# Patient Record
Sex: MALE | Race: WHITE | NOT HISPANIC OR LATINO | Employment: UNEMPLOYED | URBAN - METROPOLITAN AREA
[De-identification: names, ages, dates, MRNs, and addresses within clinical notes are randomized per-mention and may not be internally consistent; named-entity substitution may affect disease eponyms.]

---

## 2019-01-30 ENCOUNTER — HOSPITAL ENCOUNTER (OUTPATIENT)
Dept: OTHER | Facility: HOSPITAL | Age: 24
Discharge: HOME | End: 2019-01-30
Attending: INTERNAL MEDICINE

## 2019-01-30 ENCOUNTER — OFFICE VISIT CONVERTED (OUTPATIENT)
Dept: PULMONOLOGY | Facility: CLINIC | Age: 24
End: 2019-01-30
Attending: INTERNAL MEDICINE

## 2019-04-16 ENCOUNTER — OFFICE VISIT CONVERTED (OUTPATIENT)
Dept: ORTHOPEDIC SURGERY | Facility: CLINIC | Age: 24
End: 2019-04-16
Attending: ORTHOPAEDIC SURGERY

## 2019-04-30 ENCOUNTER — HOSPITAL ENCOUNTER (OUTPATIENT)
Dept: CARDIOLOGY | Facility: HOSPITAL | Age: 24
Discharge: HOME | End: 2019-04-30
Attending: INTERNAL MEDICINE

## 2020-01-10 ENCOUNTER — OFFICE VISIT CONVERTED (OUTPATIENT)
Dept: PULMONOLOGY | Facility: CLINIC | Age: 25
End: 2020-01-10
Attending: PHYSICIAN ASSISTANT

## 2020-01-13 ENCOUNTER — HOSPITAL ENCOUNTER (OUTPATIENT)
Dept: OTHER | Facility: HOSPITAL | Age: 25
Discharge: HOME | End: 2020-01-13
Attending: PHYSICIAN ASSISTANT

## 2020-01-14 LAB
CONV IGG SUBCLASS 2: 221 MG/DL (ref 130–555)
CONV IGG SUBCLASS 3: 32 MG/DL (ref 15–102)
CONV IGG SUBCLASS 4: 268 MG/DL (ref 2–96)
CONV IMMUNOGLOBULIN G (IGG): 938 MG/DL (ref 700–1600)
CONV IMMUNOGLOBULIN G (IGG): 961 MG/DL (ref 700–1600)
CONV IMMUNOGLOBULIN M (IGM): 47 MG/DL (ref 20–172)
IGA SERPL-MCNC: 237 MG/DL (ref 90–386)
IGG SUBCLASS 1: 335 MG/DL (ref 248–810)

## 2020-01-15 ENCOUNTER — HOSPITAL ENCOUNTER (OUTPATIENT)
Dept: OTHER | Facility: HOSPITAL | Age: 25
Discharge: HOME | End: 2020-01-15
Attending: PHYSICIAN ASSISTANT

## 2020-01-15 LAB
COCOA IGE QN: <0.1 K[IU]/ML (ref 0–0.35)
CORN IGE QN: <0.1 K[IU]/ML (ref 0–0.35)
COW MILK IGE QN: <0.1 K[IU]/ML (ref 0–0.35)
EGG WHITE IGE QN: <0.1 K[IU]/ML (ref 0–0.35)
IGE SERPL-ACNC: 328 K[IU]/ML (ref 0–24)
IMMUNOCAP RESULT: ABNORMAL (ref 0–0)
OAT IGE QN: <0.1 K[IU]/ML (ref 0–0.35)
PEANUT IGE QN: <0.1 K[IU]/ML (ref 0–0.35)
RICE IGE QN: <0.1 K[IU]/ML (ref 0–0.35)
SOYBEAN IGE QN: <0.1 K[IU]/ML (ref 0–0.35)
WHEAT IGE QN: <0.1 K[IU]/ML (ref 0–0.35)

## 2020-01-16 LAB
CONV QUANTIFERON TB GOLD: NEGATIVE
QUANTIFERON CRITERIA: NORMAL
QUANTIFERON MITOGEN VALUE: >10 IU/ML
QUANTIFERON NIL VALUE: 0.01 IU/ML
QUANTIFERON TB1 AG VALUE: 0.03 IU/ML
QUANTIFERON TB2 AG VALUE: 0.01 IU/ML

## 2021-05-15 VITALS — OXYGEN SATURATION: 97 % | BODY MASS INDEX: 21.71 KG/M2 | HEART RATE: 90 BPM | HEIGHT: 61 IN | WEIGHT: 115 LBS

## 2021-05-28 VITALS
HEIGHT: 61 IN | HEART RATE: 97 BPM | BODY MASS INDEX: 22.87 KG/M2 | WEIGHT: 121.12 LBS | OXYGEN SATURATION: 97 % | DIASTOLIC BLOOD PRESSURE: 80 MMHG | RESPIRATION RATE: 10 BRPM | SYSTOLIC BLOOD PRESSURE: 112 MMHG | TEMPERATURE: 99.6 F

## 2021-05-28 VITALS
HEART RATE: 115 BPM | DIASTOLIC BLOOD PRESSURE: 91 MMHG | WEIGHT: 112.25 LBS | OXYGEN SATURATION: 96 % | TEMPERATURE: 98.9 F | BODY MASS INDEX: 21.19 KG/M2 | SYSTOLIC BLOOD PRESSURE: 125 MMHG | RESPIRATION RATE: 16 BRPM | HEIGHT: 61 IN

## 2021-05-28 NOTE — PROGRESS NOTES
Patient: SHANE CAICEDO     Acct: [id number]     Report: #[id number]  UNIT #: [id number]     : 1995    Encounter Date:2019  PRIMARY CARE: MCKAYLA SHABAZZ  ***Signed***  --------------------------------------------------------------------------------------------------------------------  Chief Complaint      Encounter Date      2019            Primary Care Provider      MCKAYLA SHABAZZ            Referring Provider            [facility]            Patient Complaint      Patient is complaining of      New pt here for [facility] f/u            VITALS      Height 5 ft 1.00 in / 154.94 cm      Weight 121 lbs 2.000 oz / 54.956423 kg      BSA 1.53 m2      BMI 22.9 kg/m2      Temperature 99.6 F / 37.56 C - Temporal      Pulse 97      Respirations 10      Blood Pressure 112/80 Sitting, Right Arm      Pulse Oximetry 97%, Room air            HPI      The patient is a 23 year old  male with a history of Cystic Fibrosis     diagnosed in childhood and was followed at [facility] pediatrics     Cystic Fibrosis clinic until approximately 3 years ago when he was lost to     follow up in transition to the adult clinic. He is here today for follow up     after I had seen  him in the hospital for MSSA pneumonia and acute hypoxic     respiratory failure. He was admitted on 19 through 19. The patient     underwent bronchoscopy which grew MSSA and his antibiotics were adjusted     appropriately. Additionally, he was worked up for a fungal etiology as the     patient had been cleaning out a chicken coop one week prior to his presentation.    He was then started on empiric Itraconazole and this was stopped after his     fungal smears came back negative. The patient improved throughout his stay in     the hospital, weaned to room air and discharged home on Brovana and Pulmicort     twice daily and has been compliant with this. He has been trying to get a vest     for percussion given his  "history bronchiectasis, this has yet to be delivered to    his home. He is currently living with his father as he is technically homeless.     He came here today on a [employer] bus. The patient is requesting a refill on his     Creon for pancreatic insufficiency. He denies any cough, shortness of breath,     sore throat or chest pain. He does not have a scale but thinks that his weight     has been stable.             Review of Systems is negative.             PAST MEDICAL HISTORY:      1. Cystic Fibrosis.       2. Pancreatic insufficiency secondary to Cystic Fibrosis.       3. History of asthma overlap syndrome.       4. Recent history of MSSA pneumonia.             PAST SURGICAL HISTORY:      1. PEG tube as a child status post reversal.       2. \"Head surgery\" not otherwise specified.            FAMILY HISTORY:      Sister with a history of diabetes.             SOCIAL HISTORY:      The patient is currently applying for disability. He is homeless but is     currently residing with his father and friends. He denies smoking but admitted     in the hospital that he would occasionally use marijuana. He states he has     stopped this completely since his hospitalization.             Medications were reviewed by myself with the patient and updated in the chart.            ROS      Constitutional:  Denies: Fatigue, Fever, Weight gain, Weight loss, Chills,     Insomnia, Other      Respiratory/Breathing:  Denies: Shortness of air, Wheezing, Cough, Hemoptysis,     Pleuritic pain, Other      Endocrine:  Denies: Polydipsia, Polyuria, Heat/cold intolerance, Diabetes, Other      Eyes:  Denies: Blurred vision, Vision Changes, Other      Ears, nose, mouth, throat:  Denies: Mouth lesions, Thrush, Throat pain,     Hoarseness, Allergies/Hay Fever, Post Nasal Drip, Headaches, Recent Head Injury,    Nose Bleeding, Neck Stiffness, Thyroid Mass, Hearing Loss, Ear Fullness, Dry     Mouth, Nasal or Sinus Pain, Dry Lips, Nasal discharge, Nasal " congestion, Other      Cardiovascular:  Denies: Palpitations, Syncope, Claudication, Chest Pain, Wake     up Gasping for air, Leg Swelling, Irregular Heart Rate, Cyanosis, Dyspnea on     Exertion, Other      Gastrointestinal:  Denies: Nausea, Constipation, Diarrhea, Abdominal pain,     Vomiting, Difficulty Swallowing, Reflux/Heartburn, Dysphagia, Jaundice,     Bloating, Melena, Bloody stools, Other      Genitourinary:  Denies: Urinary frequency, Incontinence, Hematuria, Urgency,     Nocturia, Dysuria, Testicular problems, Other      Musculoskeletal:  Denies: Joint Pain, Joint Stiffness, Joint Swelling, Myalgias,    Other      Hematologic/lymphatic:  DENIES: Lymphadenopathy, Bruising, Bleeding tendencies,     Other      Neurological:  Denies: Headache, Numbness, Weakness, Seizures, Other      Psychiatric:  Denies: Anxiety, Appropriate Effect, Depression, Other      Sleep:  No: Excessive daytime sleep, Morning Headache?, Snoring, Insomnia?, Stop    breathing at sleep?, Other      Integumentary:  Denies: Rash, Dry skin, Skin Warm to Touch, Other      Immunologic/Allergic:  Denies: Latex allergy, Seasonal allergies, Asthma,     Urticaria, Eczema, Other      Immunization status:  No: Up to date            FAMILY/SOCIAL/MEDICAL HX      Surgical History:  Yes: Abdominal Surgery (PEG TUBE AS CHILD), Head Surgery; No:    Appendectomy, Bladder Surgery, Bowel Surgery, CABG, Cholecystectomy, Oral     Surgery, Orthopedic Surgery, Vascular Surgery      Diabetes - Family Hx:  Sister      Is Father Still Living?:  Yes       Family History:  Unknown      Smoking status:  Never smoker      Anticoagulation Therapy:  No      Antibiotic Prophylaxis:  No      Medical History:  Yes: Asthma, Shortness Of Breath, Miscellaneous Medical/oth     (CYSTIC FIBROSIS); No: Blood Disease, Chemotherapy/Cancer, Chronic     Bronchitis/COPD, Deafness or Ringing Ears      Psychiatric History      None            PREVENTION      Hx Influenza Vaccination:   Yes      Date Influenza Vaccine Given:  Jan 1, 2019      Influenza Vaccine Declined:  No      2 or More Falls Past Year?:  No      Fall Past Year with Injury?:  No      Hx Pneumococcal Vaccination:  No      Encouraged to follow-up with:  PCP regarding preventative exams.      Chart initiated by      Trinity Jarquin MA            ALLERGIES/MEDICATIONS      Allergies:        Coded Allergies:             NO KNOWN ALLERGIES (Unverified , 1/30/19)      Medications    Last Reconciled on 1/30/19 11:10 by MIKE FREEMAN DO      guaiFENesin (Mucinex*) 1,200 Mg Tab.er.12h      1200 MG PO BID, #60 TAB 0 Refills         Prov: JanineParam gaineser         1/13/19       Neb-Budesonide (Budesonide) 0.5 Mg/2 Ml Ampul.neb      0.5 MG INH RTBID, #60 NEB 0 Refills         Prov: JanineDany gaines         1/13/19       Arformoterol Tartrate (Brovana) 15 Mcg/2 Ml Vial.neb      15 MCG INH RTBID, #60 NEB 0 Refills         Prov: Dany Mehta         1/13/19       Albuterol/Ipratropium (Duoneb) 3 Ml Ampul.neb      3 ML INH RTQ4H PRN for WHEEZING / SHORTNESS OF BREATH, #120 NEB 0 Refills         Prov: Dany Mehta         1/13/19       Amylase/Lipase/Protease (Creon 6*) 1 Each Capsule.      6000 UNITS PO WITH MILK for 30 Days, #60 CAP.SR         Reported         1/9/19       Amylase/Lipase/Protease (Creon 6*) 1 Each Capsule.      3-4 CAP PO WITH SNACKS for 30 Days, #60 CAP.SR         Reported         1/9/19       Amylase/Lipase/Protease (Creon 6*) 1 Each Capsule.      5-6 CAP PO TID MEALS for 30 Days, #90 CAP.SR         Reported         1/9/19      Current Medications      Current Medications Reviewed 1/30/19            EXAM      Vital signs reviewed.      HEENT: Pupils are equally round and reactive to light and accommodation.      Extraocular muscles intact bilateral. Nares patent without hypertrophy of the     turbinates.  TM's are clear bilaterally. Small oropharynx without lesions or     erythema.       NECK:  Supple without tracheal  deviation or lymphadenopathy. No thyromegaly     appreciated.       LYMPHATICS: No cervical or supraclavicular lymphadenopathy.       RESPIRATORY:  Clear to auscultation bilaterally, no wheezes, rales or rhonchi,     tympanic to percussion.      CVS:  Regular rate and rhythm, no murmurs, rubs or gallops, no lower extremity     edema, , equal radial pulses.      GI: Abdomen soft, nontender, nondistended, no hepatomegaly appreciated.  Bowel     sounds present in all 4 quadrants.       MUSCULOSKELETAL: No erythema, warmth or fluctuance over the major joints     including the knees, ankles, wrists and elbows.        SKIN: No rashes or lesions.       NEUROLOGICAL: Alert and oriented X 3.  No focal deficits on exam. Cranial nerves    II-XII intact bilaterally.       PSYCH: Patient has appropriate mood and affect.      Vtials      Vitals:             Height 5 ft 1.00 in / 154.94 cm           Weight 121 lbs 2.000 oz / 54.718728 kg           BSA 1.53 m2           BMI 22.9 kg/m2           Temperature 99.6 F / 37.56 C - Temporal           Pulse 97           Respirations 10           Blood Pressure 112/80 Sitting, Right Arm           Pulse Oximetry 97%, Room air            REVIEW      Results Reviewed      PCCS Results Reviewed?:  Yes Prev Lab Results, Yes Prev Radiology Results, Yes     Previous M[facility]ial Records (I personally reviewed the patient's hospital discharge     summary. )      Lab Results      I personally reviewed the patient's microbiology results from his bronchoscopy.     He grew MSSA. Fungal culture acid fast bacillus smear was negative. I also     reviewed pathology which was negative for malignancy. I also reviewed blood work    including IgE level which was 107, the rest of the immunoglobulin panel was not     results. Urine histo antigen was less than 0.5.  Bronchoalveolar lavage histo     antigen was negative. Quantiferon tuberculosis gold test was indeterminate. HIV     was negative. Cystic Fibrosis mutation  panel showed Cystic Fibrosis mutation TG     2 variants detected. It was positive for 2 mutations, delta FI 08 and delta F5     08.      Radiographic Results               [facility]                PACS RADIOLOGY REPORT            Patient: SHANE CAICEDO   Acct: #[id number]   Report: #[id number]            UNIT #: [id number]    DOS: 01/10/19 0806      INSURANCE:[employer]   ORDER #:[id number]      LOCATION:[address]   : 1995            PROVIDERS      ADMITTING:  Dany Mehta   ATTENDING: Dany Mehta      FAMILY:  NONE,MD   ORDERING:  MIKE FREEMAN         OTHER:    DICTATING:  Niko Galvez MD            REQ #:[id number]   EXAM:CHWO - CT CHEST without CONTRAST      REASON FOR EXAM:  Hypoxia, pneumonia, exposure to histoplasmosis      REASON FOR VISIT:  PNEUMONIA            *******Signed******         PROCEDURE:   CT CHEST WITHOUT CONTRAST             COMPARISON:   [facility], CR, CHEST PA/AP   19:33.  [facility], , CHEST PA/AP          INDICATIONS:   Hypoxia, pneumonia, exposure to histoplasmosis             TECHNIQUE:   CT images were created without the administration of contrast     material.               PROTOCOL:     Standard imaging protocol performed                RADIATION:     DLP: 339mGy*cm          Automated exposure control was utilized to minimize radiation dose.              FINDINGS:         Lung window images reveal diffuse bronchial wall thickening.  Bilateral     bronchocentric alveolar       airspace disease is seen throughout both lungs, upper lobe predominant.             Mediastinal windows reveal mediastinal adenopathy.  Hilar fullness probably     represents adenopathy.             No Coronary artery calcifications are evident.             The spleen is of normal size.             CONCLUSION:         CT scan of the chest without IV contrast demonstrating bilateral  alveolar     airspace disease with       bronchial wall thickening and adenopathy.             The differential diagnosis includes fungal infection and tuberculosis.      Metastatic disease,       leukemia, and atypical lung infection are less likely.                RACHAEL DELUNA MD             Electronically Signed and Approved By: RACHAEL DELUNA MD on 1/10/2019 at 11:00                           Until signed, this is an unconfirmed preliminary report that may contain      errors and is subject to change.                                              COUST:      D:01/10/19 1100            Assessment      Cystic fibrosis - E84.9            Bronchiectasis - J47.9            Bronchiectasis with acute lower respiratory infection - J47.0            Homelessness - Z59.0            Notes      Changed Medications      * Amylase/Lipase/Protease (Creon 6*) 1 EACH CAPSULE.DR:         From: 5-6 CAP PO TID MEALS 30 Days #90         To: 6 CAP PO ASDIR 30 Days #900         Instructions: 6 caps with meals 3 times a day 4 caps with snacks 2 times a        day 1 cap with milk 4 times a day      Discontinued Medications      * Amoxicillin/Clavulanic Acid 875/125 (Augmentin 875/125) 1 EACH TABLET: 875 MG       PO BID #20      * Amylase/Lipase/Protease (Creon 6*) 1 EACH CAPSULE.DR: 3-4 CAP PO WITH SNACKS       30 Days #60         Instructions: 1 CAP      * Amylase/Lipase/Protease (Creon 6*) 1 EACH CAPSULE.DR: 6,000 UNITS PO WITH MILK      30 Days #60         Instructions: 1 CAP      New Diagnostics      * 6 Min Walk With Pulse Ox, Routine         Dx: Cystic fibrosis - E84.9      * PFT-Comp, PrePost,DLCO,BodyBox, Routine         Dx: Cystic fibrosis - E84.9      * Chest W/O Cont CT, Month         Dx: Bronchiectasis with acute lower respiratory infection - J47.0      * Quantiferon Tb Test, As Soon As Possible         Dx: Homelessness - Z59.0      * Rast Panel 10 Allerg, Routine         Dx: Bronchiectasis - J47.9      * Rast Aspergillus  Fum IGE, Routine         Dx: Bronchiectasis - J47.9      * Immuno A (Iga), Routine         Dx: Bronchiectasis - J47.9      * Immuno G (Igg), Routine         Dx: Bronchiectasis - J47.9      * IgG SUBCLASSES 1-4  IGGSC, Routine         Dx: Bronchiectasis - J47.9      * Immuno M (Igm), Routine         Dx: Bronchiectasis - J47.9      New Office Procedures      * Prevnar 0.5 PCV13, As Soon As Possible         Pneumoc 13-Kayleigh Conj-Dip CRm/Pf (Prevnar 13 Syringe) 0.5 ML SYRINGE: 0.5        MILLILITER INTRAMUSCULARLY Qty 1 SYRINGE         Dx: Cystic fibrosis - E84.9      ASSESSMENT:      1. 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      2. Bronchiectasis.       3. Bronchiectasis with recent lower respiratory infection secondary to MSSA .       4. MSSA pneumonia .       5. Indeterminate tuberculosis quantiferon test.       6. Homelessness.       7. Pancreatic insufficiency secondary to Cystic Fibrosis.       8. Elevated IgE.             PLAN:      1. I have ordered full pulmonary function tests and a six minute walk test as a     part of his routine work up for Cystic Fibrosis.       2. I have ordered a repeat CT scan of the chest without contrast in 1 month to     make sure the diffuse bilateral airspace disease is improving as it was     suspicious for a fungal or miliary tuberculosis pattern.       3. I have asked for a repeat quantiferon tuberculosis test as soon as possible.     The patient denies any tuberculosis exposures.       4. In regards to his elevated IgE level, I have ordered a RAST aspergillus with     specific IgE antibody and sent the other immunoglobulins as part of the work up     for his bronchiectasis, IgA, IgM and IgG subclasses.       5. Continue Brovana and Pulmicort twice daily.       6. We will await his vest to be delivered  for airway clearance.       7. We gave the patient Prevnar vaccine today given his immunocompromised state     in the setting of Cystic Fibrosis. He had his flu vaccine  prior to his discharge    from the hospital.       8. The patient will follow up in 1-2 months to go over the above testing.            Patient Education      Time Spent:  > 50% /[facility] (cystic fibrosis, airway clearance)                 Disclaimer: Converted document may not contain table formatting or lab diagrams. Please see [employer] for the authenticated document.

## 2021-05-28 NOTE — PROGRESS NOTES
Patient: SHANE CAICEDO     Acct: [id number]     Report: #[id number]  UNIT #: [id number]     : 1995    Encounter Date:01/10/2020  PRIMARY CARE: MCKAYLA SHABAZZ  ***Signed***  --------------------------------------------------------------------------------------------------------------------  Chief Complaint      Encounter Date      Juan 10, 2020            Primary Care Provider      MCKAYLA SHABAZZ            Referring Provider            [facility]            Patient Complaint      Patient is complaining of      Pt here for [facility] Art pt, Cystic Fibrosis            VITALS      Height 5 ft 1 in / 154.94 cm      Weight 112 lbs 4 oz / 50.956000 kg      BSA 1.48 m2      BMI 21.2 kg/m2      Temperature 98.9 F / 37.17 C - Oral      Pulse 115      Respirations 16      Blood Pressure 125/91 Sitting, Left Arm      Pulse Oximetry 96%, room air            HPI      The patient is a very pleasant 24 white male seen by Dr. Cox as a new patient    1 year ago on 19. He had cystic fibrosis diagnosed in childhood and was     followed at [facility] clinic until several years ago when he     was lost to follow up in transition to the adult clinic. He followed up with Dr. Cox after being hospitalized for MSSA pneumonia and acute hypoxic respiratory    failure. He underwent bronchoscopy during that hospitalization and she continued    him on Perforomist and Pulmicort nebulizer, ordered vest therapy which he did     receive but is not using. He was apparently homeless at the time of his last vi    sit and had an indeterminate tuberculosis quantiferon gold test. She ordered     additional work up including full pulmonary function test and 6 minute walk test    which he had done in May 2019.  She ordered repeat CT scan of the chest to be     done in 1 month to ensure resolution of recent pneumonia. She ordered repeat     quantiferon gold test to be done as soon as possible and the patient  never had     this done. She also ordered additional work up for his elevated IgE level and     work up for bronchiectasis including immunoglobulins and RAST panel. He has been    lost to follow up until now and appears to be here today because he wants a     refill on his Creon. The patient states he has not followed up with any medical     providers in the past year and seemed completely unaware that he was supposed to    have repeat quantiferon gold test, blood work or CT scan of the chest done. He     feels he is currently in his normal state of health and denies increased     dyspnea, coughing or wheezing, hemoptysis, fever or chills. The patient states     he chronically coughs up light yellow sputum and this is unchanged for him. He     is no longer using any nebulizer and apparently ran out of Perforomist and     Pulmicort. He is here with an older male friend of his who picked him up     hitchhiking 5-6 years ago and they have remained friends since then. He friend     was not aware he was supposed to be using a nebulizer machine, vest therapy or     that he was supposed to have additional work up done. The patient reports he is     not using his vest therapy.             I reviewed the Review of Systems, medical, surgical and family history and agree    with those as entered.      Copies To:   MIKE FREEMAN      Constitutional:  Denies: Fatigue, Fever, Weight gain, Weight loss, Chills,     Insomnia, Other      Respiratory/Breathing:  Complains of: Cough; Denies: Shortness of air, Wheezing,    Hemoptysis, Pleuritic pain, Other      Endocrine:  Denies: Polydipsia, Polyuria, Heat/cold intolerance, Diabetes, Other      Eyes:  Denies: Blurred vision, Vision Changes, Other      Ears, nose, mouth, throat:  Denies: Mouth lesions, Thrush, Throat pain,     Hoarseness, Allergies/Hay Fever, Post Nasal Drip, Headaches, Recent Head Injury,    Nose Bleeding, Neck Stiffness, Thyroid Mass, Hearing Loss, Ear  Fullness, Dry     Mouth, Nasal or Sinus Pain, Dry Lips, Nasal discharge, Nasal congestion, Other      Cardiovascular:  Denies: Palpitations, Syncope, Claudication, Chest Pain, Wake     up Gasping for air, Leg Swelling, Irregular Heart Rate, Cyanosis, Dyspnea on     Exertion, Other      Gastrointestinal:  Denies: Nausea, Constipation, Diarrhea, Abdominal pain,     Vomiting, Difficulty Swallowing, Reflux/Heartburn, Dysphagia, Jaundice,     Bloating, Melena, Bloody stools, Other      Genitourinary:  Denies: Urinary frequency, Incontinence, Hematuria, Urgency,     Nocturia, Dysuria, Testicular problems, Other      Musculoskeletal:  Denies: Joint Pain, Joint Stiffness, Joint Swelling, Myalgias,    Other      Hematologic/lymphatic:  DENIES: Lymphadenopathy, Bruising, Bleeding tendencies,     Other      Neurological:  Denies: Headache, Numbness, Weakness, Seizures, Other      Psychiatric:  Denies: Anxiety, Appropriate Effect, Depression, Other      Sleep:  No: Excessive daytime sleep, Morning Headache?, Snoring, Insomnia?, Stop    breathing at sleep?, Other      Immunologic/Allergic:  Denies: Latex allergy, Seasonal allergies, Asthma,     Urticaria, Eczema, Other      Immunization status:  No: Up to date            FAMILY/SOCIAL/MEDICAL HX      Surgical History:  Yes: Abdominal Surgery (PEG TUBE AS CHILD), Head Surgery; No:    Appendectomy, Bladder Surgery, Bowel Surgery, CABG, Cholecystectomy, Oral     Surgery, Orthopedic Surgery, Vascular Surgery      Diabetes - Family Hx:  Sister      Is Father Still Living?:  Yes       Family History:  Unknown      Social History:  No Tobacco Use, No Alcohol Use, No Recreational Drug use      Smoking status:  Never smoker      Anticoagulation Therapy:  No      Antibiotic Prophylaxis:  No      Medical History:  Yes: Asthma, Shortness Of Breath, Miscellaneous Medical/oth     (CYSTIC FIBROSIS); No: Blood Disease, Chemotherapy/Cancer, Chronic     Bronchitis/COPD, Deafness or Ringing Ears,  Sinus Trouble      Psychiatric History      none            PREVENTION      Hx Influenza Vaccination:  Yes      Date Influenza Vaccine Given:  Jan 1, 2019      Influenza Vaccine Declined:  No      2 or More Falls Past Year?:  No      Fall Past Year with Injury?:  No      Hx Pneumococcal Vaccination:  No      Encouraged to follow-up with:  PCP regarding preventative exams.      Chart initiated by      Mike Gil MA            ALLERGIES/MEDICATIONS      Allergies:        Coded Allergies:             NO KNOWN ALLERGIES (Unverified , 1/10/20)      Medications    Last Reconciled on 1/10/20 15:18 by KHURRAM HANSEN      Amylase/Lipase/Protease (Creon 6*) 1 Each Capsule.dr [place] CAP PO ASDIR for 30 Days, #900 CAP.SR 6 Refills         Prov: PETE,MIKE         1/30/19      Current Medications      Current Medications Reviewed 1/10/20            EXAM      VITAL SIGNS:  Reviewed.        NECK:  Supple without tracheal deviation or lymphadenopathy.  No thyromegaly     appreciated.      LYMPHATICS:  No cervical or supraclavicular lymphadenopathy.      HEENT: Pupils are equal, round and reactive to light. There is no scleral     icterus.  Nares patent without hypertrophy of the turbinates. No erythema of the    passages.  TMs are clear bilaterally with good cone of light. The posterior     pharynx is without  lesions or erythema.      RESPIRATORY: Mildly decreased breath sounds throughout, no wheezes, rhonchi or     crackles, normal work of breathing noted.        CARDIOVASCULAR:  Regular rate and rhythm.  No murmurs, gallops or rubs.  No     lower extremity edema.  Equal radial pulses.        GI: Soft, nontender, nondistended, no organomegaly.  Bowel sounds present in all    four quadrants.      MUSCULOSKELETAL:  No joint effusions, erythema or warmth over the major joint     systems.      SKIN:  No rashes or lesions.      NEUROLOGIC: Cranial nerves II-XII are intact bilaterally.  Moves all     extremities. Ambulates  with ease.      PSYCH:  Appropriate mood and affect.      Vtials      Vitals:             Height 5 ft 1 in / 154.94 cm           Weight 112 lbs 4 oz / 50.956036 kg           BSA 1.48 m2           BMI 21.2 kg/m2           Temperature 98.9 F / 37.17 C - Oral           Pulse 115           Respirations 16           Blood Pressure 125/91 Sitting, Left Arm           Pulse Oximetry 96%, room air            REVIEW      Results Reviewed      PCCS Results Reviewed?:  Yes Prev Lab Results, Yes Prev Radiology Results, Yes     Previous Mecial Records      PFT Results      Patient: MARCUS CAICEDO   Acct: #[id number]   Report: #[id number]            UNIT #: [id number]    DOS:       LOCATION:[facility]     : 1995            PROVIDERS      ADMITTING:     FAMILY:  EDYTA GUERRA         OTHER:       DICTATING:  MIKE FREEMAN DO            REASON FOR VISIT:  J47.9            *******Signed******                                    [facility]                          Health Information Management Services                            [place], [place]  [address]               __________________________________________________________________________             Patient Name:                   Attending Physician:      Marcus Caicedo D.O.             Patient Visit # MR #            Admit Date  Disch Date     Location      [id number]    [id number]      2019                 [facility]- -             Date of Birth      1995      __________________________________________________________________________      821 - DIAGNOSTIC REPORT             PULMONARY FUNCTION TEST             SPIROMETRY:      FEV1/FVC ratio 98%.      FEV1 129% of predicted, 4.61 L.      % of predicted, 5.64 L.             LUNG VOLUMES:      Total lung capacity 136% of predicted, 6.92 L.      Residual volume 130% of predicted, 1.28 L.             DIFFUSION CAPACITY:      DLCO 96% of predicted.              CONCLUSION:      1.  Normal spirometry without obstructive defect.      2.  Lung volumes show airtrapping and hyperinflation.      3.  Diffusion capacity is normal.      4.  Consider emphysema.             To be electronically signed in [facility]      [id number] MIKE FREEMAN D.O.             KM:vh      D:  05/13/2019 09:21      T:  05/13/2019 10:15      #[id number]             Until signed, this is an unconfirmed preliminary report that may contain      errors and is subject to change.                   Until signed, this is an unconfirmed preliminary report that may contain      errors and is subject to change.                     <Electronically signed by MIKE FREEMAN DO>                05/13/19 1229               MIKE FREEMAN DO                                                                  [facility]      D:05/13/19 0921            Assessment      Cystic fibrosis - E84.9            Multifocal pneumonia - J18.9            Notes      Renewed Medications      * Amylase/Lipase/Protease (Creon 6*) 1 EACH CAPSULE.DR: 6 CAP PO ASDIR 30 Days       #900         Instructions: 6 caps with meals 3 times a day 4 caps with snacks 2 times a        day 1 cap with milk 4 times a day      * Formoterol Fumarate (Perforomist) 20 MCG/2 ML VIAL.NEB: 20 MCG INH BID #60      * Neb-Budesonide (Budesonide) 0.5 MG/2 ML AMPUL.NEB: 0.5 MG INH RTBID #60         Instructions: DIAGNOSIS CODE REQUIRED PRIOR TO PRESCRIBING.      * Albuterol/Ipratropium (Duoneb) 3 ML AMPUL.NEB: 3 ML INH RTQ4H PRN WHEEZING /       SHORTNESS OF BREATH #120         Instructions: DIAGNOSIS CODE REQUIRED PRIOR TO PRESCRIBING.      Discontinued Medications      * guaiFENesin LA (Mucinex) 1,200 MG TAB.ER.12H: 1,200 MG PO BID #60      New Diagnostics      * Chest W/O Cont CT, 1 DAY         Dx: Cystic fibrosis - E84.9      * Immuno A (Iga), Routine         Dx: Cystic fibrosis - E84.9      * Immuno G (Igg), Routine         Dx: Cystic fibrosis - E84.9      * Immuno M  (Igm), Routine         Dx: Cystic fibrosis - E84.9      * IgG SUBCLASSES 1-4  IGGSC, Routine         Dx: Cystic fibrosis - E84.9      * Quantiferon Tb Test, As Soon As Possible         Dx: Cystic fibrosis - E84.9      * Rast Panel 10 Allerg, Routine         Dx: Cystic fibrosis - E84.9      ASSESSMENT:      1. 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      2. Bronchiectasis.       3. Bronchiectasis with recent lower respiratory infection secondary to MSSA .       4. MSSA pneumonia .       5. Indeterminate tuberculosis quantiferon test.       6. Homelessness.       7. Pancreatic insufficiency secondary to Cystic Fibrosis.       8. Elevated IgE.       9. Medical noncompliance.             PLAN:      1. I have discussed with the patient the extreme importance of having follow up     testing done as ordered. I have in particular stressed the importance of having     repeat quantiferon gold testing done as soon as possible. I have also discussed     this with our  Doris regarding public health concern of     potentially having untreated tuberculosis.       2. I have  reordered RAST panel and immunoglobulins to be done as soon as     possible.       3. I have ordered CT scan of the chest without contrast to be done now to ensure    resolution of previous pneumonia.       4. I have restarted Perforomist and Pulmicort nebulizer to mix and use twice     daily. I counseled the patient on the importance of using those as prescribed     with his nebulizer machine.       5. I counseled him on the importance of using his vest therapy at least twice     daily for airway clearance. The patient verbalized understanding. All his     questions were answered today.       6. I refilled Creon for him today.       7. I will have him follow up closely in 1 month with Dr. Cox in [place]     and counseled him on the importance of keeping this follow up appointment. The     patient verbalized understanding.        8. Vaccination status can be readdressed at his next follow up visit but he     appears to be up to date on flu and Prevnar vaccines            Patient Education      Patient Education Provided:  How to use a Nebulizer      Time Spent:  > 50% /Coord [facility]            Electronically signed by JANEEN BECERRA PA-C  01/13/2020 14:58       Disclaimer: Converted document may not contain table formatting or lab diagrams. Please see [employer] for the authenticated document.

## 2021-11-08 ENCOUNTER — OFFICE VISIT (OUTPATIENT)
Dept: PULMONOLOGY | Facility: CLINIC | Age: 26
End: 2021-11-08

## 2021-11-08 VITALS
WEIGHT: 120 LBS | HEIGHT: 61 IN | BODY MASS INDEX: 22.66 KG/M2 | TEMPERATURE: 98.2 F | HEART RATE: 87 BPM | OXYGEN SATURATION: 100 % | RESPIRATION RATE: 14 BRPM | DIASTOLIC BLOOD PRESSURE: 80 MMHG | SYSTOLIC BLOOD PRESSURE: 105 MMHG

## 2021-11-08 DIAGNOSIS — J47.1 BRONCHIECTASIS WITH ACUTE EXACERBATION (HCC): ICD-10-CM

## 2021-11-08 DIAGNOSIS — E84.0 CYSTIC FIBROSIS WITH PULMONARY MANIFESTATIONS (HCC): Primary | ICD-10-CM

## 2021-11-08 DIAGNOSIS — Z72.0 TOBACCO CHEW USE: ICD-10-CM

## 2021-11-08 DIAGNOSIS — Z91.14 POOR COMPLIANCE WITH MEDICATION: ICD-10-CM

## 2021-11-08 DIAGNOSIS — J45.909 ASTHMA, UNSPECIFIED ASTHMA SEVERITY, UNSPECIFIED WHETHER COMPLICATED, UNSPECIFIED WHETHER PERSISTENT: ICD-10-CM

## 2021-11-08 DIAGNOSIS — R05.9 COUGH: ICD-10-CM

## 2021-11-08 DIAGNOSIS — F12.10 MARIJUANA ABUSE: ICD-10-CM

## 2021-11-08 PROCEDURE — 99203 OFFICE O/P NEW LOW 30 MIN: CPT | Performed by: SPECIALIST

## 2021-11-08 RX ORDER — FORMOTEROL FUMARATE 20 UG/2ML
20 SOLUTION RESPIRATORY (INHALATION)
Qty: 60 EACH | Refills: 5
Start: 2021-11-08 | End: 2022-05-20 | Stop reason: SDUPTHER

## 2021-11-08 RX ORDER — ALBUTEROL SULFATE 2.5 MG/3ML
2.5 SOLUTION RESPIRATORY (INHALATION) EVERY 4 HOURS PRN
Qty: 120 EACH | Refills: 5
Start: 2021-11-08 | End: 2022-05-20 | Stop reason: SDUPTHER

## 2021-11-08 RX ORDER — BUDESONIDE 0.5 MG/2ML
0.5 INHALANT ORAL 2 TIMES DAILY
Qty: 60 EACH | Refills: 5
Start: 2021-11-08 | End: 2022-05-20 | Stop reason: SDUPTHER

## 2021-11-08 RX ORDER — PANCRELIPASE 30000; 6000; 19000 [USP'U]/1; [USP'U]/1; [USP'U]/1
CAPSULE, DELAYED RELEASE PELLETS ORAL
COMMUNITY
End: 2022-05-20 | Stop reason: SDUPTHER

## 2021-11-08 NOTE — PATIENT INSTRUCTIONS
Bronchiectasis    Bronchiectasis is a condition in which the airways in the lungs (bronchi) are damaged and widened. The condition makes it hard for the lungs to get rid of mucus, and it causes mucus to gather in the bronchi. This condition often leads to lung infections, which can make the condition worse.  What are the causes?  You can be born with this condition or you can develop it later in life. Common causes of this condition include:  · Cystic fibrosis.  · Repeated lung infections, such as pneumonia or tuberculosis.  · An object or other blockage in the lungs.  · Breathing in fluid, food, or other objects (aspiration).  · A problem with the immune system and lung structure that is present at birth (congenital).  Sometimes the cause is not known.  What are the signs or symptoms?  Common symptoms of this condition include:  · A daily cough that brings up mucus and lasts for more than 3 weeks.  · Lung infections that happen often.  · Shortness of breath and wheezing.  · Weakness and fatigue.  How is this diagnosed?  This condition is diagnosed with tests, such as:  · Chest X-rays or CT scans. These are done to check for changes in the lungs.  · Breathing tests. These are done to check how well your lungs are working.  · A test of a sample of your saliva (sputum culture). This test is done to check for infection.  · Blood tests and other tests. These are done to check for related diseases or causes.  How is this treated?  Treatment for this condition depends on the severity of the illness and its cause. Treatment may include:  · Medicines that loosen mucus so it can be coughed up (expectorants).  · Medicines that relax the muscles of the bronchi (bronchodilators).  · Antibiotic medicines to prevent or treat infection.  · Physical therapy to help clear mucus from the lungs. Techniques may include:  ? Postural drainage. This is when you sit or lie in certain positions so that mucus can drain by gravity.  ? Chest  percussion. This involves tapping the chest or back with a cupped hand.  ? Chest vibration. For this therapy, a hand or special equipment vibrates your chest and back.  · Surgery to remove the affected part of the lung. This may be done in severe cases.  Follow these instructions at home:  Medicines  · Take over-the-counter and prescription medicines only as told by your health care provider.  · If you were prescribed an antibiotic medicine, take it as told by your health care provider. Do not stop taking the antibiotic even if you start to feel better.  · Avoid taking sedatives and antihistamines unless your health care provider tells you to take them. These medicines tend to thicken the mucus in the lungs.  Managing symptoms  · Perform breathing exercises or techniques to clear your lungs as told by your health care provider.  · Consider using a cold steam vaporizer or humidifier in your room or home to help loosen secretions.  · If you have a cough that gets worse at night, try sleeping in a semi-upright position.  General instructions  · Get plenty of rest.  · Drink enough fluid to keep your urine clear or pale yellow.  · Stay inside when pollution and ozone levels are high.  · Stay up to date with vaccinations and immunizations.  · Avoid cigarette smoke and other lung irritants.  · Do not use any products that contain nicotine or tobacco, such as cigarettes and e-cigarettes. If you need help quitting, ask your health care provider.  · Keep all follow-up visits as told by your health care provider. This is important.  Contact a health care provider if:  · You cough up more sputum than before and the sputum is yellow or green in color.  · You have a fever.  · You cannot control your cough and are losing sleep.  Get help right away if:  · You cough up blood.  · You have chest pain.  · You have increasing shortness of breath.  · You have pain that gets worse or is not controlled with medicines.  · You have a fever  and your symptoms suddenly get worse.  Summary  · Bronchiectasis is a condition in which the airways in the lungs (bronchi) are damaged and widened. The condition makes it hard for the lungs to get rid of mucus, and it causes mucus to gather in the bronchi.  · Treatment usually includes therapy to help clear mucus from the lungs.  · Stay up to date with vaccinations and immunizations.  This information is not intended to replace advice given to you by your health care provider. Make sure you discuss any questions you have with your health care provider.  Document Revised: 11/30/2018 Document Reviewed: 01/22/2018  [employer] Patient Education © 2021 [employer]    Cystic Fibrosis, Adult    Cystic fibrosis (CF) is a disease that affects many parts of the body. CF affects the lungs and leads to breathing (respiratory) failure, which is the most serious related problem. CF can also affect the skin, pancreas, liver, intestines, sinuses, and sex organs.  Cystic fibrosis results from a problem with how the body's cells use water and salt. This problem causes the body's secretions to thicken. When mucus in the lungs becomes thick and very sticky, this causes difficulty breathing and frequent lung infections.  Cystic fibrosis is a lifelong disease. Although there is no cure at this time, care and treatment can help you manage your condition.  What are the causes?  Cystic fibrosis is caused by a flawed (defective) gene that is passed from parent to child (inherited). This gene leads to a change in a protein that affects all the cells in your body, including the cells that make mucus and sweat. It is possible to have the gene without developing the disease. To develop CF, you must inherit one copy of the defective gene from each parent.  What increases the risk?  You are more likely to develop this condition if you:  · Have a parent or sibling with CF.  · Have parents who have the gene that causes CF.  · Are white  ().  What are the signs or symptoms?  Symptoms of this condition include:  · Ongoing (chronic) coughing or wheezing.  · Shortness of breath.  · Frequent lung infections.  · Frequent inflammation of the sinuses (sinusitis).  · Frequent small growths in the nose (nasal polyps).  · Inflammation of the pancreas (recurrent pancreatitis).  · Trouble gaining weight.  · Unusually salty sweat or skin.  · Inability to tolerate heat (heat exhaustion) with low salt levels in the blood.  · Fertility problems or inability to have children (sterility).  · Joint inflammation (arthritis).  How is this diagnosed?  Cystic fibrosis is usually diagnosed during early childhood, but sometimes diagnosis happens later in life. Adults diagnosed later in life usually have a milder form of the disease. Diagnosis may include tests such as:  · Sweat test, also called a sweat chloride test. This is the standard test for diagnosing cystic fibrosis. A chemical is applied to cause a patch of your skin to sweat. That sweat is analyzed to see if it contains the chemicals that are present in CF.  · Genetic testing to check for the gene changes (mutations) associated with CF.  Other tests may include:  · Blood tests to check salt balance, liver function, vitamin levels, and blood counts.  · Chest or sinus X-rays to show how the lungs and breathing system are affected by the disease.  · Lung (pulmonary) function tests to show how well the lungs are working.  · Sputum culture This test analyzes mucus from the lungs to find out if there is an infection.  · Stool (feces) testing. This is done to see if the pancreas is working normally.  · Fertility testing. In males, this may involve an ultrasound of the testes.  How is this treated?  There is no cure for cystic fibrosis, but treatment can help you manage symptoms. Treatment may include:  · Medicines to thin your mucus or to help with breathing.  · Antibiotic medicines to prevent or treat  infections.  · Vaccines to prevent infections.  · Pills (enzyme capsules) to help with digestion.  · Changes to your diet, such as:  ? Following a high-calorie diet to stay at a healthy weight, which helps to reduce coughing and breathing problems.  ? Following a high-salt (high-sodium) diet or taking salt supplements.  ? Taking nutritional supplements.  ? Drinking lots of fluid.  · Using devices to help you breathe, and having regular breathing treatments using a machine that delivers medicine to your lungs (nebulizer).  · Doing exercises or activities to help you breathe.  · Surgery to remove nasal polyps, relieve a digestive system blockage, or perform a lung or liver transplant.  Follow these instructions at home:  Medicines  · Take over-the-counter and prescription medicines only as told by your health care provider.  · If you were prescribed an antibiotic, take it as told by your health care provider. Do not stop taking the antibiotic even if you start to feel better.  Eating and drinking    · Follow instructions from your health care provider about eating and drinking. You may need to:  ? Track how much fluid you drink, how many calories you eat, or how much salt (sodium) you eat.  ? Eat a high-calorie, high-fat diet, if you are underweight.  · Take nutritional supplements only as recommended by your health care provider.    Lifestyle  · Do not use any products that contain nicotine or tobacco, such as cigarettes and e-cigarettes. If you need help quitting, ask your health care provider.  · Avoid secondhand smoke.  · Exercise as told by your health care provider. Try to be as physically active as possible.  General instructions  · Follow any recommended methods to improve breathing. Use any treatments to clear mucus as told by your health care provider.  · Stay up to date on your immunizations and vaccinations.  · Make sure you and all people in your household wash hands often with soap and water. Doing this  lowers the chance of infection. If soap and water are not available, use hand .  · Consider joining a support group for adults with CF.  · Keep all follow-up visits as told by your health care provider. This is important.  Contact a health care provider if:  · You cannot eat or drink without vomiting.  · You lose weight without trying.  · You feel tired or do not have energy.  · You are constipated. Signs of constipation include:  ? Fewer bowel movements in a week than normal.  ? Difficulty having a bowel movement.  ? Stools that are dry, hard, or larger than normal.  · You have diarrhea.  · You have a fever.  Get help right away if:  · You have chest pain.  · You have problems breathing.  · You are dizzy or you faint.  · You have a lot more mucus than usual.  · You have new or severe pain in your abdomen.  · You have yellowing skin or eyes (jaundice).  · You throw up dark green or yellow fluid.  Summary  · Cystic fibrosis (CF) is a disease that affects many parts of the body. It affects the lungs and can lead to breathing (respiratory) failure.  · You may need medicines, dietary changes, breathing devices, and breathing treatments.  · Avoid smoking, and stay away from secondhand smoke.  This information is not intended to replace advice given to you by your health care provider. Make sure you discuss any questions you have with your health care provider.  Document Revised: 11/30/2018 Document Reviewed: 06/12/2018  [employer] Patient Education © 2021 [employer]    Living With Cystic Fibrosis, Adult  Cystic fibrosis (CF) is an inherited disease that causes thick mucus to build up in your lungs. It can also affect other parts of your body, such as the pancreas and intestines. When the lungs get affected:  · It will be hard for you to breathe. You may cough and wheeze.  · You may get sick more often as a result of lung infections. You may need to stay in the hospital.  There is no cure for CF, but there are  treatments and ways you can manage your symptoms and improve your quality of life.  How to manage lifestyle changes  Managing stress         Living with CF can be stressful. Managing stress can help you manage CF. Too much stress can:  · Worsen lung problems.  · Increase your risk for infection.  · Cause depression or anxiety.  · Cause poor appetite and poor nutrition.  To cope with stress:  · Exercise every day. Ask your health care team to suggest activities that are best for you.  · Get 8-10 hours of sleep each night.  · Stay positive about your health. Try to accept that you cannot control your CF perfectly, but you can improve your symptoms.  · Breathe slowly and deeply when you need to stay calm.  · Spend time doing the things that you enjoy, such as being outdoors or spending time with friends and people who make you laugh.  · Avoid caffeine, drugs, and alcohol.  · Eat a nutritious, balanced diet, and make sure you eat enough calories.  · Relax each day. Use meditation, yoga, music, or other relaxing activities.  · Express yourself through journal writing, art projects, crafting, or playing music.    Medicines  Your health care provider may give you certain medicines and nutritional supplements to help relieve your symptoms. Avoid using alcohol and other substances that may prevent your medicines from working properly.  It is also important to:  · Talk with your health care provider or pharmacist about all the medicines that you are taking.  ? Tell your health care provider about the side effects of your medicines.  ? Ask what medicines are safe to take together.  · Take part in all treatment sessions (shared decision-making). Shared decision-making should be part of your treatment plan.  Relationships  Living with cystic fibrosis can make it hard for you to join family and friends for occasions and activities. When this happens:  · Stay positive and keep up with your treatment or health plan.  · Talk to your  friends and family about your disease and how it affects you. Having strong support from family and friends is very important. You may not know how to react to their questions. Here are some suggestions:  ? Tell people when you feel comfortable with them. You do not have to tell people if you do not want to.  ? Explain that CF is a long-term (chronic) disease with no cure.  ? Explain that you sometimes will cough, wheeze, and have trouble breathing.  ? Explain that you need daily treatment (including medicines, inhalers and physical therapies) to clear your airways and stay healthy.  ? Explain that you may not be able to be around people when they are sick, or if they smoke because it is very dangerous for you if you get an infection.  · Talk with other people who are living with cystic fibrosis. Consider joining a support group for adults with CF.  How to recognize changes in your condition  Signs that your CF is being well-managed include:  · Being able to breathe more easily and deeply for longer periods of time.  Signs that your CF may be getting worse include:  · More trouble breathing.  · Depression or anxiety.  · Frequent infections, such as pneumonia.  · Losing weight without trying.  · Being very tired for a long time.  Follow these instructions at home:    · Take over-the-counter and prescription medicines only as told by your health care provider.  · Follow instructions from your health care provider about eating and drinking.  · Drink plenty of fluids throughout the day.  · Do daily therapies as told by your health care provider. You may need to do Airway Clearance Therapy (ACT) to loosen up the mucous in the lungs.  · Make sure you and all people in your household wash hands often with soap and water. This lowers your risk for infection. If soap and water are not available, use hand .  · Do not use any products that contain nicotine or tobacco, such as cigarettes and e-cigarettes. If you need  help quitting, ask your health care provider.  Where to find support  · Join a support group. There are many [facility] that work with patients living with cystic fibrosis.  · Try to get a generic form of the medicines you are taking. This may be less expensive. Some makers of prescription medicines also offer help to patients who cannot afford the medicines they need.  Where to find more information  · Cystic Fibrosis Foundation: www.cff.org  · [employer], support groups: www.cfri.org  · American Lung Association: www.lung.org  Contact a health care provider if you:  · Cannot eat or drink without vomiting.  · Lose weight without trying.  · Feel tired or do not have energy.  · Are constipated.  · Have diarrhea.  · Have a fever.  Get help right away if you:  · Have chest pain.  · Have problems breathing.  · Have a fever of over 101.4° F (38.5° C), especially with cough or difficulty breathing.  · Are dizzy or you faint.  · Have a lot more mucus than usual.  · Have new or severe pain in your abdomen.  · Have yellowing skin or eyes (jaundice).  · Vomit dark green or yellow fluid.  Summary  · Cystic fibrosis (CF) is a chronic, inherited disease that causes thick mucus to build up in your lungs and other parts of your body.  · To manage your condition, take your medicines as told by your health care provider. Lower stress by exercising, getting enough sleep, and eating a healthy diet. Follow your health care plan closely.  · Avoid caffeine, drugs, alcohol, and tobacco.  · Educate your family and friends about CF. Having strong support from family and friends is very important.  · Consider joining a support group for adults with CF.  This information is not intended to replace advice given to you by your health care provider. Make sure you discuss any questions you have with your health care provider.  Document Revised: 04/03/2019 Document Reviewed: 04/03/2019  [employer]  Patient Education © 2021 [employer]    Managing the Challenge of Quitting Smoking  Quitting smoking is a physical and mental challenge. You will face cravings, withdrawal symptoms, and temptation. Before quitting, work with your health care provider to make a plan that can help you manage quitting. Preparation can help you quit and keep you from giving in.  How to manage lifestyle changes  Managing stress  Stress can make you want to smoke, and wanting to smoke may cause stress. It is important to find ways to manage your stress. You might try some of the following:  · Practice relaxation techniques.  ? Breathe slowly and deeply, in through your nose and out through your mouth.  ? Listen to music.  ? Soak in a bath or take a shower.  ? Imagine a peaceful place or vacation.  · Get some support.  ? Talk with family or friends about your stress.  ? Join a support group.  ? Talk with a counselor or therapist.  · Get some physical activity.  ? Go for a walk, run, or bike ride.  ? Play a favorite sport.  ? Practice yoga.    Medicines  Talk with your health care provider about medicines that might help you deal with cravings and make quitting easier for you.  Relationships  Social situations can be difficult when you are quitting smoking. To manage this, you can:  · Avoid parties and other social situations where people might be smoking.  · Avoid alcohol.  · Leave right away if you have the urge to smoke.  · Explain to your family and friends that you are quitting smoking. Ask for support and let them know you might be a bit grumpy.  · Plan activities where smoking is not an option.  General instructions  Be aware that many people gain weight after they quit smoking. However, not everyone does. To keep from gaining weight, have a plan in place before you quit and stick to the plan after you quit. Your plan should include:  · Having healthy snacks. When you have a craving, it may help to:  ? Eat popcorn, carrots, celery,  or other cut vegetables.  ? Chew sugar-free gum.  · Changing how you eat.  ? Eat small portion sizes at meals.  ? Eat 4-6 small meals throughout the day instead of 1-2 large meals a day.  ? Be mindful when you eat. Do not watch television or do other things that might distract you as you eat.  · Exercising regularly.  ? Make time to exercise each day. If you do not have time for a long workout, do short bouts of exercise for 5-10 minutes several times a day.  ? Do some form of strengthening exercise, such as weight lifting.  ? Do some exercise that gets your heart beating and causes you to breathe deeply, such as walking fast, running, swimming, or biking. This is very important.  · Drinking plenty of water or other low-calorie or no-calorie drinks. Drink 6-8 glasses of water daily.    How to recognize withdrawal symptoms  Your body and mind may experience discomfort as you try to get used to not having nicotine in your system. These effects are called withdrawal symptoms. They may include:  · Feeling hungrier than normal.  · Having trouble concentrating.  · Feeling irritable or restless.  · Having trouble sleeping.  · Feeling depressed.  · Craving a cigarette.  To manage withdrawal symptoms:  · Avoid places, people, and activities that trigger your cravings.  · Remember why you want to quit.  · Get plenty of sleep.  · Avoid coffee and other caffeinated drinks. These may worsen some of your symptoms.  These symptoms may surprise you. But be assured that they are normal to have when quitting smoking.  How to manage cravings  Come up with a plan for how to deal with your cravings. The plan should include the following:  · A definition of the specific situation you want to deal with.  · An alternative action you will take.  · A clear idea for how this action will help.  · The name of someone who might help you with this.  Cravings usually last for 5-10 minutes. Consider taking the following actions to help you with  your plan to deal with cravings:  · Keep your mouth busy.  ? Chew sugar-free gum.  ? Suck on hard candies or a straw.  ? Brush your teeth.  · Keep your hands and body busy.  ? Change to a different activity right away.  ? Squeeze or play with a ball.  ? Do an activity or a hobby, such as making bead jewelry, practicing needlepoint, or working with wood.  ? Mix up your normal routine.  ? Take a short exercise break. Go for a quick walk or run up and down stairs.  · Focus on doing something kind or helpful for someone else.  · Call a friend or family member to talk during a craving.  · Join a support group.  · Contact a quitline.  Where to find support  To get help or find a support group:  · Call the [employer] [place]'s Smoking Quitline: 8-843-QUIT NOW (716-9936)  · Visit the website of the Substance Abuse and Mental Health Services Administration: www.samhsa.gov  · Text QUIT to SmokefreeTXT: 467568  Where to find more information  Visit these websites to find more information on quitting smoking:  · [facility] [place]: www.smokefree.gov  · American Lung Association: www.lung.org  · American Cancer Society: www.cancer.org  · Centers for Disease Control and Prevention: www.cdc.gov  · American Heart Association: www.heart.org  Contact a health care provider if:  · You want to change your plan for quitting.  · The medicines you are taking are not helping.  · Your eating feels out of control or you cannot sleep.  Get help right away if:  · You feel depressed or become very anxious.  Summary  · Quitting smoking is a physical and mental challenge. You will face cravings, withdrawal symptoms, and temptation to smoke again. Preparation can help you as you go through these challenges.  · Try different techniques to manage stress, handle social situations, and prevent weight gain.  · You can deal with cravings by keeping your mouth busy (such as by chewing gum), keeping your hands and body busy, calling family  or friends, or contacting a quitline for people who want to quit smoking.  · You can deal with withdrawal symptoms by avoiding places where people smoke, getting plenty of rest, and avoiding drinks with caffeine.  This information is not intended to replace advice given to you by your health care provider. Make sure you discuss any questions you have with your health care provider.  Document Revised: 10/06/2020 Document Reviewed: 10/06/2020  [employer] Patient Education © 2021 [employer]    Preventing Marijuana Misuse  Marijuana is a mixture of the dried leaves and flowers of the hemp plant Cannabis sativa. The plant's active ingredients (cannabinoids) change the chemistry of the brain. If you smoke or eat marijuana, you will experience changes in the way you think, feel, and behave.  What is marijuana used for?  In some cases, marijuana is prescribed by a health care provider (medical marijuana) for temporary relief from a medical condition. It can have medical effects, such as:  · Reduced nausea.  · Increased appetite.  · Reduced muscle spasm.  · Pain relief.  · Anxiety relief.  Many people use marijuana for recreational purposes because it helps them relax and puts them in a pleasurable mood (marijuana high).  How can marijuana use affect me?  Marijuana affects you both mentally and physically. Using marijuana can make you feel high and relaxed. It can also have negative effects, both short term and long term. When used for recreational purposes, marijuana is often used in higher doses and for longer periods. High doses of marijuana can cause unpleasant side effects. It is also possible to become addicted to this drug.  Short-term effects of marijuana use include:  · Changes in mood and perception, such as an altered sense of time.  · Increased heart rate.  · Increased appetite.  · Slowed movement, coordination, and reaction time.  · Poor memory, judgment, and problem-solving ability.  · Drowsiness.  · Bloodshot  eyes and changes in vision.  · Coughing.  High doses of marijuana can cause:  · Panic.  · Anxiety.  · Mental confusion.  · Severe dizziness.  · Vomiting.  · Hallucinations. This means you see, hear, taste, smell, or feel things that are not real.  · Coma.  What can happen if I keep using marijuana?  If you use marijuana for a long time, it can have long-term effects such as:  · Higher risk of health problems, such as:  ? Lung and breathing problems.  ? Possible higher risk of heart and cardiovascular problems or testicular cancer.  · Mental and physical dependence (addiction).  · Slowed brain development in young people. Babies whose mothers used marijuana during pregnancy may have an increased risk of problems with brain development and behavior.  · Hallucinations or temporary periods of false perceptions or beliefs (paranoia).  · Worsening of mental illness or onset of new mental illness. This can include anxiety, depression, or suicidal thoughts.  · Difficulty maintaining healthy relationships.  · Poor memory and difficulty concentrating and learning. This can result in decreased intelligence, poor performance at school or work, and an increased risk of dropping out of school.  · Higher risk of using other substances like alcohol, nicotine, and illegal drugs.  · A condition called cannabinoid hyperemesis syndrome. This causes repeated episodes of intense abdominal pain, nausea, and vomiting.  Quitting marijuana after using it for a long time can cause withdrawal symptoms, such as:  · Headache.  · Shakiness.  · Cravings for the drug.  · Sweating.  · Stomach pain, nausea, and vomiting.  · Restlessness and trouble sleeping.  · Anxiety, irritability, and anger.  · Decreased appetite.  What are the benefits of not using marijuana?  Not using marijuana can keep you from becoming dependent on it. You can avoid the drug's negative effects on your quality of life. You can avoid accidents caused by the slowed reaction time  and decreased thinking ability that are common with marijuana use and abuse. You can also prevent the long-term effects that this drug can cause.  What actions can I take to stop using marijuana?  If you are not physically or mentally dependent on marijuana, you should be able to stop using it on your own. Taking these actions may help:  · Find healthy ways to cope with stress, such as exercise, meditation, or spending time with family and friends. Talk with your health care provider about how you feel and how to cope with stress.  · Spend time with people who do not use marijuana, or make new friends who do not use marijuana.  · Do something else instead of using marijuana. You can exercise, take up a hobby, or participate in activities that you can do with others.  · Do not be afraid to say no if someone offers you marijuana. Speak up about why you do not want to use drugs. You can be a positive role model for others.  If you cannot stop on your own, ask your health care provider for help. Treatment for marijuana addiction is similar to treatment for other addictions. It may include:  · Cognitive-behavioral therapy (psychotherapy). This may include individual or group therapy.  · Joining a support group.  · Treating medical, behavioral, or mental health conditions that exist along with marijuana dependency.  Where to find more information  Learn more about:  · Marijuana from the [place] on Drug Abuse: www.drugabuse.gov  · Medical marijuana from the National Institutes of Health: nccih.nih.gov  · Treatment options from the Substance Abuse and Mental Health Services Administration: samhsa.gov  · Recovery from marijuana dependency from Recovery.org: www.recovery.org  Contact a health care provider if:  · You want to stop using marijuana but you cannot.  · You have withdrawal symptoms when you try to stop using marijuana.  · You are using marijuana every day.  · You need to use increasing amounts of  marijuana to get the same desired effect.  · You are using marijuana along with other drugs like cocaine or alcohol.  · You have anxiety or depression.  · You have hallucinations or paranoia.  · Marijuana use is interfering with your relationships or your ability to function normally at school or at work.  Get help right away if:  · You develop severe chest pain, dizziness, or shortness of breath.  · You have severe abdominal pain, nausea, and vomiting.  Summary  · Using marijuana can make you feel high and relaxed, and if used properly, it can have some medical benefits. However, it can also have negative effects, both short term and long term.  · High doses of marijuana can cause unpleasant side effects. These can be both physical and mental.  · Marijuana has the potential to cause physical dependence and even addiction.  · Long-term use may interfere with your ability to function normally at home, school, or work. It can sometimes lead to using other substances like alcohol, nicotine, and illegal drugs.  · If you need help to stop using marijuana, ask your health care provider. Marijuana addiction can be treated.  This information is not intended to replace advice given to you by your health care provider. Make sure you discuss any questions you have with your health care provider.  Document Revised: 10/14/2020 Document Reviewed: 10/14/2020  [employer] Patient Education © 2021 [employer]    Steps to Quit Smoking  Smoking tobacco is the leading cause of preventable death. It can affect almost every organ in the body. Smoking puts you and people around you at risk for many serious, long-lasting (chronic) diseases. Quitting smoking can be hard, but it is one of the best things that you can do for your health. It is never too late to quit.  How do I get ready to quit?  When you decide to quit smoking, make a plan to help you succeed. Before you quit:  · Pick a date to quit. Set a date within the next 2 weeks to give  you time to prepare.  · Write down the reasons why you are quitting. Keep this list in places where you will see it often.  · Tell your family, friends, and co-workers that you are quitting. Their support is important.  · Talk with your doctor about the choices that may help you quit.  · Find out if your health insurance will pay for these treatments.  · Know the people, places, things, and activities that make you want to smoke (triggers). Avoid them.  What first steps can I take to quit smoking?  · Throw away all cigarettes at home, at work, and in your car.  · Throw away the things that you use when you smoke, such as ashtrays and lighters.  · Clean your car. Make sure to empty the ashtray.  · Clean your home, including curtains and carpets.  What can I do to help me quit smoking?  Talk with your doctor about taking medicines and seeing a counselor at the same time. You are more likely to succeed when you do both.  · If you are pregnant or breastfeeding, talk with your doctor about counseling or other ways to quit smoking. Do not take medicine to help you quit smoking unless your doctor tells you to do so.  To quit smoking:  Quit right away  · Quit smoking totally, instead of slowly cutting back on how much you smoke over a period of time.  · Go to counseling. You are more likely to quit if you go to counseling sessions regularly.  Take medicine  You may take medicines to help you quit. Some medicines need a prescription, and some you can buy over-the-counter. Some medicines may contain a drug called nicotine to replace the nicotine in cigarettes. Medicines may:  · Help you to stop having the desire to smoke (cravings).  · Help to stop the problems that come when you stop smoking (withdrawal symptoms).  Your doctor may ask you to use:  · Nicotine patches, gum, or lozenges.  · Nicotine inhalers or sprays.  · Non-nicotine medicine that is taken by mouth.  Find resources  Find resources and other ways to help you  quit smoking and remain smoke-free after you quit. These resources are most helpful when you use them often. They include:  · Online chats with a counselor.  · Phone quitlines.  · Printed self-help materials.  · Support groups or group counseling.  · Text messaging programs.  · Mobile phone apps. Use apps on your mobile phone or tablet that can help you stick to your quit plan. There are many free apps for mobile phones and tablets as well as websites. Examples include Quit Guide from the CDC and smokefree.gov    What things can I do to make it easier to quit?    · Talk to your family and friends. Ask them to support and encourage you.  · Call a phone quitline (1-800-QUIT-NOW), reach out to support groups, or work with a counselor.  · Ask people who smoke to not smoke around you.  · Avoid places that make you want to smoke, such as:  ? Bars.  ? Parties.  ? Smoke-break areas at work.  · Spend time with people who do not smoke.  · Lower the stress in your life. Stress can make you want to smoke. Try these things to help your stress:  ? Getting regular exercise.  ? Doing deep-breathing exercises.  ? Doing yoga.  ? Meditating.  ? Doing a body scan. To do this, close your eyes, focus on one area of your body at a time from head to toe. Notice which parts of your body are tense. Try to relax the muscles in those areas.  How will I feel when I quit smoking?  Day 1 to 3 weeks  Within the first 24 hours, you may start to have some problems that come from quitting tobacco. These problems are very bad 2-3 days after you quit, but they do not often last for more than 2-3 weeks. You may get these symptoms:  · Mood swings.  · Feeling restless, nervous, angry, or annoyed.  · Trouble concentrating.  · Dizziness.  · Strong desire for high-sugar foods and nicotine.  · Weight gain.  · Trouble pooping (constipation).  · Feeling like you may vomit (nausea).  · Coughing or a sore throat.  · Changes in how the medicines that you take for  other issues work in your body.  · Depression.  · Trouble sleeping (insomnia).  Week 3 and afterward  After the first 2-3 weeks of quitting, you may start to notice more positive results, such as:  · Better sense of smell and taste.  · Less coughing and sore throat.  · Slower heart rate.  · Lower blood pressure.  · Clearer skin.  · Better breathing.  · Fewer sick days.  Quitting smoking can be hard. Do not give up if you fail the first time. Some people need to try a few times before they succeed. Do your best to stick to your quit plan, and talk with your doctor if you have any questions or concerns.  Summary  · Smoking tobacco is the leading cause of preventable death. Quitting smoking can be hard, but it is one of the best things that you can do for your health.  · When you decide to quit smoking, make a plan to help you succeed.  · Quit smoking right away, not slowly over a period of time.  · When you start quitting, seek help from your doctor, family, or friends.  This information is not intended to replace advice given to you by your health care provider. Make sure you discuss any questions you have with your health care provider.  Document Revised: 09/11/2020 Document Reviewed: 03/07/2020  [employer] Patient Education © 2021 [employer]

## 2021-11-09 NOTE — PROGRESS NOTES
CONSULT NOTE     CHIEF COMPLAINT  Chief Complaint   Patient presents with   • Cystic Fibrosis     New Patient   • Cough   • Asthma        Primary Care Provider  Kisha Zaidi APRN     Referring Provider  DARINEL Doshi    Patient Complaint    ICD-10-CM ICD-9-CM   1. Cystic fibrosis with pulmonary manifestations (HCC)  E84.0 277.02   2. Cough  R05.9 786.2   3. Bronchiectasis with acute exacerbation (HCC)  J47.1 494.1   4. Tobacco chew use  Z72.0 305.1   5. Marijuana abuse  F12.10 305.20   6. Poor compliance with medication  Z91.14 V15.81   7. Asthma, unspecified asthma severity, unspecified whether complicated, unspecified whether persistent  J45.909 493.90            Subjective          History of Presenting Illness  Marcus Jensen is a 26 y.o. male with a history of pulmonary cystic fibrosis and was diagnosed a long time ago and the patient was moved from child to adult situation and being followed here and I did not follow for the past few years as the patient is getting the medications for the cystic fibrosis with the family physician.  Most of the symptoms are explained by his father that patient is getting very short of breath with constant coughing spells and bringing up the phlegm which is very thick and sometimes very difficult to do it.  Unfortunately patient continued to chew tobacco and take marijuana.  Patient has a dog in the house and used to work in a factory in the past and had the alpha-1 antitrypsin done and pulmonary function testing done in 2019 and the CAT scan of the chest in January 15, 2020 showed significantly improved infectious bronchiolitis right greater than the left compared to the 2019 and pulmonary function testing is within normal limits with increased lung volumes otherwise unremarkable and alpha-1 antitrypsin is normal phenotype which are normal levels.  Denies any fever, chills, sweating, or any exposure to anybody who is sick around him.  No recent travel history.  Other  work-up is as noted.  Denied any chest pain, palpitation or any other related diaphoretic symptoms.  Denied any loss of conscious or any dizzy s.    I have personally reviewed the review of systems, past family, social, medical and surgical histories; and agree with their findings.    HISTORY    Family History   Problem Relation Age of Onset   • Asthma Sister         Social History     Socioeconomic History   • Marital status:    Tobacco Use   • Smoking status: Never Smoker   • Smokeless tobacco: Current User     Types: Chew   Vaping Use   • Vaping Use: Never used   Substance and Sexual Activity   • Alcohol use: Yes        History reviewed. No pertinent past medical history.       There is no immunization history on file for this patient.    No Known Allergies       Current Outpatient Medications:   •  pancrelipase, Lip-Prot-Amyl, (Creon) 6000-53870 units capsule delayed-release particles capsule, Creon 6,000-19,000 -30,000 unit oral capsule,delayed release(DR/EC) take 4 capsules by oral route 3 times per day with meals and 2 capsules with each snack   Active, Disp: , Rfl:   •  albuterol (PROVENTIL) (2.5 MG/3ML) 0.083% nebulizer solution, Take 2.5 mg by nebulization Every 4 (Four) Hours As Needed for Wheezing., Disp: 120 each, Rfl: 5  •  budesonide (Pulmicort) 0.5 MG/2ML nebulizer solution, Take 2 mL by nebulization 2 (Two) Times a Day for 30 days., Disp: 60 each, Rfl: 5  •  formoterol (PERFOROMIST) 20 MCG/2ML nebulizer solution, Take 2 mL by nebulization 2 (Two) Times a Day., Disp: 60 each, Rfl: 5  •  revefenacin (YUPELRI) 175 MCG/3ML nebulizer solution, Take 3 mL by nebulization Daily., Disp: 90 mL, Rfl: 5     Smoking status: Chewed tobacco    Objective     Vital Signs:   Vitals:    11/08/21 0853   BP: 105/80   Pulse: 87   Resp: 14   Temp: 98.2 °F (36.8 °C)   SpO2: 100%        Physical Exam:  Pleasant young man but not the best historian and most of the history is given by the patient's father.  Patient is  alert and oriented.  HEENT: Atraumatic anicteric and using the face mask for COVID-19 precautions.  Neck: Supple, no JVD.  Chest and lungs: Decreased breath sounds with scattered rhonchi heard best posteriorly and on expiration.  Cardiovascular system: Regular rate and rhythm.  Abdomen: Soft and benign.  Bowel sounds present.  Extremities: No clubbing, no cyanosis, no edema.  Central nervous system: Grossly intact  Result Review :   I have personally reviewed the previous medical records and the CT scan and as reviewed         Impression:  History of cystic fibrosis with pulmonary manifestations  Chronic cough  Bronchiectasis with acute exacerbation.  Chewed tobacco.  Marijuana abuse  Poor compliance with the medications  In no way he can quit smoking or marijuana at this time.      plan:  Discussed in length with the patient and the patient's father.  He is not taking his medications.  He is not using the vest and he says that the straps are not good.  He has been smoking and taking marijuana for long time in no way he can quit them at this time.  Discussed with him about importance of using the vest therapy and controlling his secretions for the bronchiectasis and cystic fibrosis.  Adjusted the patient medication and give her the nebulizer therapies as noted.  Patient education on bronchiectasis/cystic fibrosis and the medications is discussed in length and the benefits of using the medicines and the rest is discussed.  I also gave him the Acapella.  High-resolution CT scan of the chest  The sputum for Gram stain culture and sensitivity.  If the patient is getting any worse patient should go to the emergency room otherwise follow-up in 3 months.  Follow Up   Return in about 3 months (around 2/8/2022).  Patient was given instructions and counseling regarding his condition or for health maintenance advice. Please see specific information pulled into the AVS if appropriate.     Josesito Adkins MD

## 2022-01-21 ENCOUNTER — OFFICE VISIT (OUTPATIENT)
Dept: PULMONOLOGY | Facility: CLINIC | Age: 27
End: 2022-01-21

## 2022-01-21 VITALS
WEIGHT: 121 LBS | OXYGEN SATURATION: 100 % | HEIGHT: 61 IN | RESPIRATION RATE: 14 BRPM | TEMPERATURE: 98.9 F | BODY MASS INDEX: 22.84 KG/M2

## 2022-01-21 DIAGNOSIS — E84.0 CYSTIC FIBROSIS WITH PULMONARY MANIFESTATIONS: Primary | ICD-10-CM

## 2022-01-21 DIAGNOSIS — Z72.0 TOBACCO CHEW USE: ICD-10-CM

## 2022-01-21 DIAGNOSIS — F12.10 MARIJUANA ABUSE: ICD-10-CM

## 2022-01-21 DIAGNOSIS — J45.909 ASTHMA, UNSPECIFIED ASTHMA SEVERITY, UNSPECIFIED WHETHER COMPLICATED, UNSPECIFIED WHETHER PERSISTENT: ICD-10-CM

## 2022-01-21 PROCEDURE — 99213 OFFICE O/P EST LOW 20 MIN: CPT | Performed by: NURSE PRACTITIONER

## 2022-01-21 NOTE — PROGRESS NOTES
Primary Care Provider  Kisha Zaidi APRN   Referring Provider  No ref. provider found    Patient Complaint  Asthma and Follow-up (3 month follow up)      SUBJECTIVE    History of Presenting Illness  Marcus Jensen is a 26 y.o. male who presents to [facility] for 3-month follow-up on cystic fibrosis and asthma.  Patient is here with his father.  Patient saw Dr. Adkins 11/8/2021 and has been using his nebulizers inhalers and vest therapy as prescribed.  Patient states he is doing well overall has no complaints today.  Patient states he does not need any refills on his medications. Denies dyspnea, coughing, wheezing, headaches, chest pain, weight loss or hemoptysis. Denies fevers, chills and night sweats. Marcus Jensen is able to perform ADLs without difficulties and denies any swollen glands/lymph nodes in the head or neck.    I have personally reviewed the review of systems, past family, social, medical and surgical histories; and agree with their findings.    Review of Systems  Constitutional symptoms:  Denied complaints   Ear, nose, throat: Denied complaints  Cardiovascular:  Denied complaints  Respiratory: Denied complaints  Gastrointestinal: Denied complaints  Musculoskeletal: Denied complaints    Family History   Problem Relation Age of Onset   • Asthma Sister         Social History     Socioeconomic History   • Marital status:    Tobacco Use   • Smoking status: Never Smoker   • Smokeless tobacco: Current User     Types: Chew   Vaping Use   • Vaping Use: Never used   Substance and Sexual Activity   • Alcohol use: Yes   • Drug use: Defer   • Sexual activity: Defer        History reviewed. No pertinent past medical history.     Immunization History   Administered Date(s) Administered   • DTaP, Unspecified 06/12/1997, 01/28/1998, 08/08/2000   • Hep B, Adolescent or Pediatric 01/28/1998   • HiB 06/12/1997, 01/28/1998   • IPV 06/12/1997, 08/08/2000  "  • MMR 08/08/2000, 10/09/2000   • Meningococcal Polysaccharide 08/05/2009   • Tdap 08/05/2009       No Known Allergies       Current Outpatient Medications:   •  albuterol (PROVENTIL) (2.5 MG/3ML) 0.083% nebulizer solution, Take 2.5 mg by nebulization Every 4 (Four) Hours As Needed for Wheezing., Disp: 120 each, Rfl: 5  •  budesonide (Pulmicort) 0.5 MG/2ML nebulizer solution, Take 2 mL by nebulization 2 (Two) Times a Day for 30 days., Disp: 60 each, Rfl: 5  •  formoterol (PERFOROMIST) 20 MCG/2ML nebulizer solution, Take 2 mL by nebulization 2 (Two) Times a Day., Disp: 60 each, Rfl: 5  •  pancrelipase, Lip-Prot-Amyl, (Creon) 6000-49716 units capsule delayed-release particles capsule, Creon 6,000-19,000 -30,000 unit oral capsule,delayed release(DR/EC) take 4 capsules by oral route 3 times per day with meals and 2 capsules with each snack   Active, Disp: , Rfl:   •  revefenacin (YUPELRI) 175 MCG/3ML nebulizer solution, Take 3 mL by nebulization Daily., Disp: 90 mL, Rfl: 5       OBJECTIVE    Vital Signs   Temp 98.9 °F (37.2 °C) (Tympanic)   Resp 14 Comment: room air  Ht 154.9 cm (61\")   Wt 54.9 kg (121 lb)   SpO2 100% Comment: room air  BMI 22.86 kg/m²     Physical Exam  Vital Signs Reviewed   WDWN, Alert, NAD.    HEENT:  PERRL, EOMI.  OP, nares clear  Neck:  Supple, no JVD, no thyromegaly  Chest:  good aeration, clear to auscultation bilaterally, tympanic to percussion bilaterally, no work of breathing noted  CV: RRR, no MGR, pulses 2+, equal.  Abd:  Soft, NT, ND, + BS, no HSM  EXT:  no clubbing, no cyanosis, no edema  Neuro:  A&Ox3, CN grossly intact, no focal deficits.  Skin: No rashes or lesions noted    Results Review  I have personally reviewed the office notes of Dr. Adkins, diagnostic imaging, labs.      ASSESSMENT & PLAN    There is no problem list on file for this patient.      Diagnoses and all orders for this visit:    1. Cystic fibrosis with pulmonary manifestations (HCC) (Primary)  -     CT Chest " Without Contrast Diagnostic; Future    2. Asthma, unspecified asthma severity, unspecified whether complicated, unspecified whether persistent    3. Marijuana abuse    4. Tobacco chew use           Plan:  Patient has not done the sputum culture as of yet.  Patient is scheduled to have a CT of his chest done.  Patient to take a sputum culture in at the time he gets his CT and will follow-up after to go over results.    Smoking status: Current with marijuana and chews tobacco  Vaccination status: Declines any vaccinations  Medications personally reviewed    Follow Up  Return in about 2 months (around 3/21/2022).   Return to office sooner if needed  Patient was given instructions and counseling regarding his condition or for health maintenance advice. Please see specific information pulled into the AVS if appropriate.

## 2022-03-15 ENCOUNTER — APPOINTMENT (OUTPATIENT)
Dept: GENERAL RADIOLOGY | Facility: HOSPITAL | Age: 27
End: 2022-03-15

## 2022-03-15 ENCOUNTER — HOSPITAL ENCOUNTER (EMERGENCY)
Facility: HOSPITAL | Age: 27
Discharge: HOME | End: 2022-03-15
Attending: EMERGENCY MEDICINE | Admitting: EMERGENCY MEDICINE

## 2022-03-15 VITALS
SYSTOLIC BLOOD PRESSURE: 110 MMHG | WEIGHT: 114.64 LBS | DIASTOLIC BLOOD PRESSURE: 76 MMHG | RESPIRATION RATE: 16 BRPM | BODY MASS INDEX: 21.64 KG/M2 | HEART RATE: 63 BPM | HEIGHT: 61 IN | TEMPERATURE: 97.7 F | OXYGEN SATURATION: 94 %

## 2022-03-15 DIAGNOSIS — K59.00 CONSTIPATION, UNSPECIFIED CONSTIPATION TYPE: Primary | ICD-10-CM

## 2022-03-15 LAB
ALBUMIN SERPL-MCNC: 3.9 G/DL (ref 3.5–5.2)
ALBUMIN/GLOB SERPL: 1.3 G/DL
ALP SERPL-CCNC: 179 U/L (ref 39–117)
ALT SERPL W P-5'-P-CCNC: 25 U/L (ref 1–41)
ANION GAP SERPL CALCULATED.3IONS-SCNC: 6.8 MMOL/L (ref 5–15)
AST SERPL-CCNC: 13 U/L (ref 1–40)
BASOPHILS # BLD AUTO: 0.1 10*3/MM3 (ref 0–0.2)
BASOPHILS NFR BLD AUTO: 0.7 % (ref 0–1.5)
BILIRUB SERPL-MCNC: 0.2 MG/DL (ref 0–1.2)
BILIRUB UR QL STRIP: NEGATIVE
BUN SERPL-MCNC: 17 MG/DL (ref 6–20)
BUN/CREAT SERPL: 19.1 (ref 7–25)
CALCIUM SPEC-SCNC: 9.2 MG/DL (ref 8.6–10.5)
CHLORIDE SERPL-SCNC: 103 MMOL/L (ref 98–107)
CLARITY UR: CLEAR
CO2 SERPL-SCNC: 27.2 MMOL/L (ref 22–29)
COLOR UR: YELLOW
CREAT SERPL-MCNC: 0.89 MG/DL (ref 0.76–1.27)
DEPRECATED RDW RBC AUTO: 47.4 FL (ref 37–54)
EGFRCR SERPLBLD CKD-EPI 2021: 122 ML/MIN/1.73
EOSINOPHIL # BLD AUTO: 0.22 10*3/MM3 (ref 0–0.4)
EOSINOPHIL NFR BLD AUTO: 1.6 % (ref 0.3–6.2)
ERYTHROCYTE [DISTWIDTH] IN BLOOD BY AUTOMATED COUNT: 14.4 % (ref 12.3–15.4)
GLOBULIN UR ELPH-MCNC: 3.1 GM/DL
GLUCOSE SERPL-MCNC: 114 MG/DL (ref 65–99)
GLUCOSE UR STRIP-MCNC: NEGATIVE MG/DL
HCT VFR BLD AUTO: 40.9 % (ref 37.5–51)
HGB BLD-MCNC: 13.2 G/DL (ref 13–17.7)
HGB UR QL STRIP.AUTO: NEGATIVE
HOLD SPECIMEN: NORMAL
HOLD SPECIMEN: NORMAL
IMM GRANULOCYTES # BLD AUTO: 0.06 10*3/MM3 (ref 0–0.05)
IMM GRANULOCYTES NFR BLD AUTO: 0.4 % (ref 0–0.5)
KETONES UR QL STRIP: NEGATIVE
LEUKOCYTE ESTERASE UR QL STRIP.AUTO: NEGATIVE
LIPASE SERPL-CCNC: 6 U/L (ref 13–60)
LYMPHOCYTES # BLD AUTO: 2.07 10*3/MM3 (ref 0.7–3.1)
LYMPHOCYTES NFR BLD AUTO: 14.9 % (ref 19.6–45.3)
MCH RBC QN AUTO: 29.1 PG (ref 26.6–33)
MCHC RBC AUTO-ENTMCNC: 32.3 G/DL (ref 31.5–35.7)
MCV RBC AUTO: 90.1 FL (ref 79–97)
MONOCYTES # BLD AUTO: 0.91 10*3/MM3 (ref 0.1–0.9)
MONOCYTES NFR BLD AUTO: 6.5 % (ref 5–12)
NEUTROPHILS NFR BLD AUTO: 10.54 10*3/MM3 (ref 1.7–7)
NEUTROPHILS NFR BLD AUTO: 75.9 % (ref 42.7–76)
NITRITE UR QL STRIP: NEGATIVE
NRBC BLD AUTO-RTO: 0 /100 WBC (ref 0–0.2)
PH UR STRIP.AUTO: 5.5 [PH] (ref 5–8)
PLATELET # BLD AUTO: 562 10*3/MM3 (ref 140–450)
PMV BLD AUTO: 9.7 FL (ref 6–12)
POTASSIUM SERPL-SCNC: 4.3 MMOL/L (ref 3.5–5.2)
PROT SERPL-MCNC: 7 G/DL (ref 6–8.5)
PROT UR QL STRIP: NEGATIVE
RBC # BLD AUTO: 4.54 10*6/MM3 (ref 4.14–5.8)
SODIUM SERPL-SCNC: 137 MMOL/L (ref 136–145)
SP GR UR STRIP: >1.03 (ref 1–1.03)
UROBILINOGEN UR QL STRIP: ABNORMAL
WBC NRBC COR # BLD: 13.9 10*3/MM3 (ref 3.4–10.8)
WHOLE BLOOD HOLD SPECIMEN: NORMAL
WHOLE BLOOD HOLD SPECIMEN: NORMAL

## 2022-03-15 PROCEDURE — 81003 URINALYSIS AUTO W/O SCOPE: CPT | Performed by: EMERGENCY MEDICINE

## 2022-03-15 PROCEDURE — 25010000002 MORPHINE PER 10 MG: Performed by: EMERGENCY MEDICINE

## 2022-03-15 PROCEDURE — 36415 COLL VENOUS BLD VENIPUNCTURE: CPT

## 2022-03-15 PROCEDURE — 25010000002 ONDANSETRON PER 1 MG: Performed by: EMERGENCY MEDICINE

## 2022-03-15 PROCEDURE — 85025 COMPLETE CBC W/AUTO DIFF WBC: CPT | Performed by: EMERGENCY MEDICINE

## 2022-03-15 PROCEDURE — 83690 ASSAY OF LIPASE: CPT | Performed by: EMERGENCY MEDICINE

## 2022-03-15 PROCEDURE — 99283 EMERGENCY DEPT VISIT LOW MDM: CPT

## 2022-03-15 PROCEDURE — 96375 TX/PRO/DX INJ NEW DRUG ADDON: CPT

## 2022-03-15 PROCEDURE — 74022 RADEX COMPL AQT ABD SERIES: CPT

## 2022-03-15 PROCEDURE — 80053 COMPREHEN METABOLIC PANEL: CPT | Performed by: EMERGENCY MEDICINE

## 2022-03-15 PROCEDURE — 96374 THER/PROPH/DIAG INJ IV PUSH: CPT

## 2022-03-15 RX ORDER — MAGNESIUM CARB/ALUMINUM HYDROX 105-160MG
296 TABLET,CHEWABLE ORAL ONCE
Status: COMPLETED | OUTPATIENT
Start: 2022-03-15 | End: 2022-03-15

## 2022-03-15 RX ORDER — ONDANSETRON 2 MG/ML
4 INJECTION INTRAMUSCULAR; INTRAVENOUS ONCE
Status: COMPLETED | OUTPATIENT
Start: 2022-03-15 | End: 2022-03-15

## 2022-03-15 RX ORDER — SODIUM CHLORIDE 0.9 % (FLUSH) 0.9 %
10 SYRINGE (ML) INJECTION AS NEEDED
Status: DISCONTINUED | OUTPATIENT
Start: 2022-03-15 | End: 2022-03-15 | Stop reason: HOSPADM

## 2022-03-15 RX ADMIN — MAGNESIUM CITRATE 296 ML: 1.75 LIQUID ORAL at 15:57

## 2022-03-15 RX ADMIN — ONDANSETRON 4 MG: 2 INJECTION INTRAMUSCULAR; INTRAVENOUS at 14:03

## 2022-03-15 RX ADMIN — MORPHINE SULFATE 4 MG: 4 INJECTION, SOLUTION INTRAMUSCULAR; INTRAVENOUS at 14:07

## 2022-03-15 NOTE — ED PROVIDER NOTES
Time: 1:12 PM EDT  Arrived by: private car  Chief Complaint: Abdominal pain/constipation  History provided by: Patient  History is limited by: N/A     History of Present Illness:  Patient is a 26 y.o. year old male that presents to the emergency department with abdominal pain.  Patient has history of cystic fibrosis.  Patient reports having difficulty with bowel movements.  His last bowel movement was yesterday but he went less than normal.    HPI    Similar Symptoms Previously: Yes  Recently seen: No      Patient Care Team  Primary Care Provider: Provider, No Known    Past Medical History:     No Known Allergies  Past Medical History:   Diagnosis Date   • Cystic fibrosis (HCC)      History reviewed. No pertinent surgical history.  Family History   Problem Relation Age of Onset   • Asthma Sister        Home Medications:  Prior to Admission medications    Medication Sig Start Date End Date Taking? Authorizing Provider   albuterol (PROVENTIL) (2.5 MG/3ML) 0.083% nebulizer solution Take 2.5 mg by nebulization Every 4 (Four) Hours As Needed for Wheezing. 11/8/21   Josesito Adkins MD   budesonide (Pulmicort) 0.5 MG/2ML nebulizer solution Take 2 mL by nebulization 2 (Two) Times a Day for 30 days. 11/8/21 12/8/21  Josesito Adkins MD   formoterol (PERFOROMIST) 20 MCG/2ML nebulizer solution Take 2 mL by nebulization 2 (Two) Times a Day. 11/8/21   Josesito Adkins MD   pancrelipase, Lip-Prot-Amyl, (Creon) 6000-94765 units capsule delayed-release particles capsule Creon 6,000-19,000 -30,000 unit oral capsule,delayed release(DR/EC) take 4 capsules by oral route 3 times per day with meals and 2 capsules with each snack   Active    Provider, MD Ewa   revefenacin (YUPELRI) 175 MCG/3ML nebulizer solution Take 3 mL by nebulization Daily. 11/8/21   Josesito Adkins MD        Social History:   Social History     Tobacco Use   • Smoking status: Never Smoker   • Smokeless tobacco: Current User     Types: Chew  "  Vaping Use   • Vaping Use: Never used   Substance Use Topics   • Alcohol use: Not Currently   • Drug use: Defer       Review of Systems:  Review of Systems   Constitutional: Positive for appetite change.   HENT: Negative.    Eyes: Negative.    Respiratory: Positive for cough and shortness of breath.    Gastrointestinal: Positive for abdominal pain and constipation.   Endocrine: Negative.    Genitourinary: Negative.    Musculoskeletal: Negative.    Skin: Negative.    Allergic/Immunologic: Negative.    Neurological: Negative.    Hematological: Negative.    Psychiatric/Behavioral: Negative.         Physical Exam:  /76   Pulse 63   Temp 97.7 °F (36.5 °C) (Oral)   Resp 16   Ht 154.9 cm (61\")   Wt 52 kg (114 lb 10.2 oz)   SpO2 94%   BMI 21.66 kg/m²     Physical Exam  Vitals and nursing note reviewed.   Constitutional:       Appearance: He is well-developed.   HENT:      Head: Normocephalic.   Cardiovascular:      Rate and Rhythm: Normal rate and regular rhythm.      Heart sounds: Normal heart sounds.   Pulmonary:      Effort: Pulmonary effort is normal.      Breath sounds: Normal breath sounds.   Abdominal:      General: Abdomen is flat. Bowel sounds are normal.      Palpations: Abdomen is soft.      Tenderness: There is no abdominal tenderness.      Hernia: No hernia is present.   Skin:     General: Skin is warm and dry.   Neurological:      General: No focal deficit present.      Mental Status: He is alert and oriented to person, place, and time.   Psychiatric:         Mood and Affect: Mood normal.                Medications in the Emergency Department:  Medications   ondansetron (ZOFRAN) injection 4 mg (4 mg Intravenous Given 3/15/22 1403)   morphine injection 4 mg (4 mg Intravenous Given 3/15/22 1407)   magnesium citrate solution 296 mL (296 mL Oral Given 3/15/22 1557)        Labs  Lab Results (last 24 hours)     ** No results found for the last 24 hours. **           Imaging:  No Radiology Exams " Resulted Within Past 24 Hours    Procedures:  Procedures    Progress                            Medical Decision Making:  MDM   Patient's abdominal series x-ray does not show any evidence for obstruction.  Patient was offered an enema but declined.  He would prefer to use magnesium citrate at home.  Patient's pain was improved with medication he is stable for discharge with magnesium citrate.  He was advised to return if symptoms do not improve.        Final diagnoses:   Constipation, unspecified constipation type        Disposition:  ED Disposition     ED Disposition   Discharge    Condition   Stable    Comment   --             Documentation assistance provided by Frankie Blunt DO acting as scribe for Frankie Blunt DO. Information recorded by the scribe was done at my direction and has been verified and validated by me.        Frankie Blunt DO  03/19/22 [id number]

## 2022-03-15 NOTE — DISCHARGE INSTRUCTIONS
Drink entire bottle of magnesium citrate at home, it should help you to have a bowel movement within several hours

## 2022-03-16 ENCOUNTER — HOSPITAL ENCOUNTER (EMERGENCY)
Facility: HOSPITAL | Age: 27
Discharge: HOME | End: 2022-03-16
Attending: EMERGENCY MEDICINE | Admitting: EMERGENCY MEDICINE

## 2022-03-16 ENCOUNTER — APPOINTMENT (OUTPATIENT)
Dept: CT IMAGING | Facility: HOSPITAL | Age: 27
End: 2022-03-16

## 2022-03-16 VITALS
RESPIRATION RATE: 20 BRPM | SYSTOLIC BLOOD PRESSURE: 138 MMHG | DIASTOLIC BLOOD PRESSURE: 61 MMHG | TEMPERATURE: 98.1 F | BODY MASS INDEX: 21.48 KG/M2 | HEIGHT: 61 IN | WEIGHT: 113.76 LBS | HEART RATE: 84 BPM | OXYGEN SATURATION: 99 %

## 2022-03-16 DIAGNOSIS — E84.19: Primary | ICD-10-CM

## 2022-03-16 LAB
ALBUMIN SERPL-MCNC: 4.1 G/DL (ref 3.5–5.2)
ALBUMIN/GLOB SERPL: 1.2 G/DL
ALP SERPL-CCNC: 177 U/L (ref 39–117)
ALT SERPL W P-5'-P-CCNC: 21 U/L (ref 1–41)
ANION GAP SERPL CALCULATED.3IONS-SCNC: 11.6 MMOL/L (ref 5–15)
AST SERPL-CCNC: 11 U/L (ref 1–40)
BASOPHILS # BLD AUTO: 0.08 10*3/MM3 (ref 0–0.2)
BASOPHILS NFR BLD AUTO: 0.5 % (ref 0–1.5)
BILIRUB SERPL-MCNC: 0.3 MG/DL (ref 0–1.2)
BUN SERPL-MCNC: 18 MG/DL (ref 6–20)
BUN/CREAT SERPL: 21.4 (ref 7–25)
CALCIUM SPEC-SCNC: 9.6 MG/DL (ref 8.6–10.5)
CHLORIDE SERPL-SCNC: 99 MMOL/L (ref 98–107)
CO2 SERPL-SCNC: 26.4 MMOL/L (ref 22–29)
CREAT SERPL-MCNC: 0.84 MG/DL (ref 0.76–1.27)
D-LACTATE SERPL-SCNC: 1.7 MMOL/L (ref 0.5–2)
DEPRECATED RDW RBC AUTO: 45.8 FL (ref 37–54)
EGFRCR SERPLBLD CKD-EPI 2021: 124.1 ML/MIN/1.73
EOSINOPHIL # BLD AUTO: 0.12 10*3/MM3 (ref 0–0.4)
EOSINOPHIL NFR BLD AUTO: 0.7 % (ref 0.3–6.2)
ERYTHROCYTE [DISTWIDTH] IN BLOOD BY AUTOMATED COUNT: 14.4 % (ref 12.3–15.4)
GLOBULIN UR ELPH-MCNC: 3.5 GM/DL
GLUCOSE SERPL-MCNC: 166 MG/DL (ref 65–99)
HCT VFR BLD AUTO: 40.3 % (ref 37.5–51)
HGB BLD-MCNC: 13.5 G/DL (ref 13–17.7)
IMM GRANULOCYTES # BLD AUTO: 0.06 10*3/MM3 (ref 0–0.05)
IMM GRANULOCYTES NFR BLD AUTO: 0.4 % (ref 0–0.5)
LIPASE SERPL-CCNC: 5 U/L (ref 13–60)
LYMPHOCYTES # BLD AUTO: 1.32 10*3/MM3 (ref 0.7–3.1)
LYMPHOCYTES NFR BLD AUTO: 7.9 % (ref 19.6–45.3)
MCH RBC QN AUTO: 29.3 PG (ref 26.6–33)
MCHC RBC AUTO-ENTMCNC: 33.5 G/DL (ref 31.5–35.7)
MCV RBC AUTO: 87.4 FL (ref 79–97)
MONOCYTES # BLD AUTO: 1.13 10*3/MM3 (ref 0.1–0.9)
MONOCYTES NFR BLD AUTO: 6.8 % (ref 5–12)
NEUTROPHILS NFR BLD AUTO: 14 10*3/MM3 (ref 1.7–7)
NEUTROPHILS NFR BLD AUTO: 83.7 % (ref 42.7–76)
NRBC BLD AUTO-RTO: 0 /100 WBC (ref 0–0.2)
PLATELET # BLD AUTO: 524 10*3/MM3 (ref 140–450)
PMV BLD AUTO: 9.5 FL (ref 6–12)
POTASSIUM SERPL-SCNC: 4.2 MMOL/L (ref 3.5–5.2)
PROT SERPL-MCNC: 7.6 G/DL (ref 6–8.5)
RBC # BLD AUTO: 4.61 10*6/MM3 (ref 4.14–5.8)
SODIUM SERPL-SCNC: 137 MMOL/L (ref 136–145)
WBC NRBC COR # BLD: 16.71 10*3/MM3 (ref 3.4–10.8)

## 2022-03-16 PROCEDURE — 83605 ASSAY OF LACTIC ACID: CPT | Performed by: NURSE PRACTITIONER

## 2022-03-16 PROCEDURE — 85025 COMPLETE CBC W/AUTO DIFF WBC: CPT | Performed by: NURSE PRACTITIONER

## 2022-03-16 PROCEDURE — 0 IOPAMIDOL PER 1 ML: Performed by: EMERGENCY MEDICINE

## 2022-03-16 PROCEDURE — 80053 COMPREHEN METABOLIC PANEL: CPT | Performed by: NURSE PRACTITIONER

## 2022-03-16 PROCEDURE — 99283 EMERGENCY DEPT VISIT LOW MDM: CPT

## 2022-03-16 PROCEDURE — 74177 CT ABD & PELVIS W/CONTRAST: CPT

## 2022-03-16 PROCEDURE — 87040 BLOOD CULTURE FOR BACTERIA: CPT | Performed by: NURSE PRACTITIONER

## 2022-03-16 PROCEDURE — 83690 ASSAY OF LIPASE: CPT | Performed by: NURSE PRACTITIONER

## 2022-03-16 RX ORDER — KETOROLAC TROMETHAMINE 10 MG/1
10 TABLET, FILM COATED ORAL EVERY 6 HOURS PRN
Qty: 15 TABLET | Refills: 0 | Status: SHIPPED | OUTPATIENT
Start: 2022-03-16 | End: 2022-09-30

## 2022-03-16 RX ORDER — KETOROLAC TROMETHAMINE 30 MG/ML
30 INJECTION, SOLUTION INTRAMUSCULAR; INTRAVENOUS ONCE
Status: DISCONTINUED | OUTPATIENT
Start: 2022-03-16 | End: 2022-03-17 | Stop reason: HOSPADM

## 2022-03-16 RX ORDER — POLYETHYLENE GLYCOL 3350 17 G/17G
17 POWDER, FOR SOLUTION ORAL 2 TIMES DAILY
Qty: 180 PACKET | Refills: 2 | Status: SHIPPED | OUTPATIENT
Start: 2022-03-16 | End: 2022-06-14

## 2022-03-16 RX ORDER — ONDANSETRON 4 MG/1
4 TABLET, ORALLY DISINTEGRATING ORAL EVERY 8 HOURS PRN
Qty: 15 TABLET | Refills: 0 | Status: SHIPPED | OUTPATIENT
Start: 2022-03-16 | End: 2022-10-04

## 2022-03-16 RX ADMIN — SODIUM CHLORIDE 1000 ML: 9 INJECTION, SOLUTION INTRAVENOUS at 09:01

## 2022-03-16 RX ADMIN — IOPAMIDOL 100 ML: 755 INJECTION, SOLUTION INTRAVENOUS at 10:04

## 2022-03-16 NOTE — ED PROVIDER NOTES
Subjective   Patient complaining of upper and periumbilical abdominal pain for approximately 4 days.  Patient also states that he has not had a bowel movement in the last 2 days.  Patient was seen in the emergency department yesterday was diagnosed with constipation.  Patient states he took his mag citrate at home but his abdominal pain increased.  Patient states has had some nausea and vomiting.  Patient denies any additional symptoms and/or concerns.      History provided by:  Patient   used: No    Abdominal Pain  Pain location:  Periumbilical  Pain quality: aching, cramping and gnawing    Pain radiates to:  Does not radiate  Pain severity:  Moderate  Onset quality:  Gradual  Duration: Over the last 4 days.  Timing:  Constant  Progression:  Worsening  Chronicity:  New  Context: laxative use    Context: not alcohol use, not awakening from sleep, not diet changes, not eating, not medication withdrawal, not previous surgeries, not recent illness, not recent travel, not retching, not sick contacts, not suspicious food intake and not trauma    Relieved by:  Nothing  Worsened by:  Nothing  Associated symptoms: constipation, nausea and vomiting    Associated symptoms: no anorexia, no belching, no chest pain, no chills, no cough, no diarrhea, no dysuria, no fatigue, no fever, no flatus, no hematemesis, no hematochezia, no hematuria, no melena, no shortness of breath and no sore throat    Associated symptoms comment:  Patient has had some nausea and vomiting that have since resolved.  Patient states has been constipated for approximately last 2 days.  Pain states he still is passing gas.  Risk factors: no alcohol abuse, no aspirin use, not elderly, no NSAID use, not obese and no recent hospitalization        Review of Systems   Constitutional: Negative for chills, fatigue and fever.   HENT: Negative for congestion, ear pain and sore throat.    Eyes: Negative for pain.   Respiratory: Negative for cough,  chest tightness and shortness of breath.    Cardiovascular: Negative for chest pain.   Gastrointestinal: Positive for abdominal pain, constipation, nausea and vomiting. Negative for anorexia, diarrhea, flatus, hematemesis, hematochezia and melena.        Patient complained of upper and periumbilical abdominal pain x4 days.  Patient states pain is gradually gotten worse.  Patient dates he has had some nausea and vomiting that has since resolved.  Patient also states that he has been constipated for the last 2 days.  However, patient states he is still passing gas.   Genitourinary: Negative for dysuria, flank pain and hematuria.   Musculoskeletal: Negative for joint swelling.   Skin: Negative for pallor.   Neurological: Negative for seizures and headaches.   Psychiatric/Behavioral: Negative.    All other systems reviewed and are negative.      Past Medical History:   Diagnosis Date   • Cystic fibrosis (HCC)        No Known Allergies    History reviewed. No pertinent surgical history.    Family History   Problem Relation Age of Onset   • Asthma Sister        Social History     Socioeconomic History   • Marital status:    Tobacco Use   • Smoking status: Never Smoker   • Smokeless tobacco: Current User     Types: Chew   Vaping Use   • Vaping Use: Never used   Substance and Sexual Activity   • Alcohol use: Not Currently   • Drug use: Defer   • Sexual activity: Defer           Objective   Physical Exam  Vitals and nursing note reviewed.   Constitutional:       General: He is not in acute distress.     Appearance: Normal appearance. He is well-developed and normal weight. He is not ill-appearing, toxic-appearing or diaphoretic.   HENT:      Head: Normocephalic and atraumatic.      Mouth/Throat:      Mouth: Mucous membranes are moist.   Eyes:      General: No scleral icterus.  Cardiovascular:      Rate and Rhythm: Normal rate and regular rhythm.      Pulses: Normal pulses.      Heart sounds: Normal heart sounds.    Pulmonary:      Effort: Pulmonary effort is normal. No respiratory distress.      Breath sounds: Normal breath sounds.   Abdominal:      General: Abdomen is flat. Bowel sounds are normal.      Palpations: Abdomen is soft.      Tenderness: There is abdominal tenderness in the periumbilical area.      Hernia: No hernia is present.   Musculoskeletal:         General: Normal range of motion.      Cervical back: Normal range of motion and neck supple.   Skin:     General: Skin is warm and dry.   Neurological:      Mental Status: He is alert and oriented to person, place, and time. Mental status is at baseline.   Psychiatric:         Mood and Affect: Mood normal.         Behavior: Behavior normal.         Procedures           ED Course                                             Consulted Dr. Mason, GI, agrees with either admission or home with bowel prep.  Willing to consult or have patient follow-up.    Consulted Dr. Krause, pulmonology, agreed with GoLYTELY bowel prep and continuation of MiraLAX.  If patient opts for admission will follow in the hospital.  Encourage patient to make sure that he follows up with pulmonology if he opts to go home at this time.      Discussed treatment options with patient.  Admission versus home with bowel prep.  Patient opted to go home at this time.  Patient states that he will start bowel prep.  And, he will follow up with pulmonology.  He will call for appointment.  Patient states that he will return to the emergency department if symptoms worsen or any additional symptoms and or concerns.    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    Jose Krause, DO  [address]  [address]  [place] [address]  [phone]    Schedule an appointment as soon as possible for a visit            Medication List      New Prescriptions    ketorolac 10 MG tablet  Commonly known as: TORADOL  Take 1 tablet by mouth Every 6 (Six) Hours As Needed for Moderate Pain .     ondansetron ODT 4 MG disintegrating tablet  Commonly known as: ZOFRAN-ODT  Place 1 tablet on the tongue Every 8 (Eight) Hours As Needed for Nausea or Vomiting.     polyethylene glycol 17 g packet  Commonly known as: MIRALAX  Take 17 g by mouth 2 (Two) Times a Day for 90 days.     polyethylene glycol 236 g solution  Commonly known as: GoLYTELY  Take 4,000 mL by mouth 1 (One) Time for 1 dose.           Where to Get Your Medications      These medications were sent to [facility] [address] [place] [address] - [address] [place] [address] - [phone]  - [phone] [address] [address]    Phone: [phone]   · ketorolac 10 MG tablet  · ondansetron ODT 4 MG disintegrating tablet  · polyethylene glycol 17 g packet  · polyethylene glycol 236 g solution          Ad Segura, APRN  03/16/22 7810

## 2022-03-16 NOTE — DISCHARGE INSTRUCTIONS
Continue to stay hydrated by drinking plenty of fluids.  If your symptoms seem to worsen over the next several days please feel free to come back to the emergency department for further evaluation.  Please obtain a primary care provider and also a pulmonologist.  Call and make appointment for follow-up with pulmonology in GI specialty.

## 2022-03-21 LAB
BACTERIA SPEC AEROBE CULT: NORMAL
BACTERIA SPEC AEROBE CULT: NORMAL

## 2022-05-20 ENCOUNTER — LAB (OUTPATIENT)
Dept: LAB | Facility: HOSPITAL | Age: 27
End: 2022-05-20

## 2022-05-20 ENCOUNTER — OFFICE VISIT (OUTPATIENT)
Dept: PULMONOLOGY | Facility: CLINIC | Age: 27
End: 2022-05-20

## 2022-05-20 VITALS
HEART RATE: 75 BPM | TEMPERATURE: 98.9 F | RESPIRATION RATE: 14 BRPM | BODY MASS INDEX: 21.75 KG/M2 | DIASTOLIC BLOOD PRESSURE: 74 MMHG | SYSTOLIC BLOOD PRESSURE: 128 MMHG | WEIGHT: 115.2 LBS | HEIGHT: 61 IN | OXYGEN SATURATION: 98 %

## 2022-05-20 DIAGNOSIS — J47.1 BRONCHIECTASIS WITH ACUTE EXACERBATION: ICD-10-CM

## 2022-05-20 DIAGNOSIS — E84.0 CYSTIC FIBROSIS WITH PULMONARY MANIFESTATIONS: ICD-10-CM

## 2022-05-20 DIAGNOSIS — E84.0 CYSTIC FIBROSIS WITH PULMONARY MANIFESTATIONS: Primary | ICD-10-CM

## 2022-05-20 DIAGNOSIS — Z91.14 POOR COMPLIANCE WITH MEDICATION: ICD-10-CM

## 2022-05-20 PROBLEM — J47.9 BRONCHIECTASIS WITHOUT ACUTE EXACERBATION (HCC): Status: ACTIVE | Noted: 2022-05-20

## 2022-05-20 LAB
ALBUMIN SERPL-MCNC: 4 G/DL (ref 3.5–5.2)
ALBUMIN/GLOB SERPL: 1.4 G/DL
ALP SERPL-CCNC: 170 U/L (ref 39–117)
ALT SERPL W P-5'-P-CCNC: 47 U/L (ref 1–41)
ANION GAP SERPL CALCULATED.3IONS-SCNC: 13.1 MMOL/L (ref 5–15)
AST SERPL-CCNC: 25 U/L (ref 1–40)
BASOPHILS # BLD AUTO: 0.08 10*3/MM3 (ref 0–0.2)
BASOPHILS NFR BLD AUTO: 0.7 % (ref 0–1.5)
BILIRUB SERPL-MCNC: 0.2 MG/DL (ref 0–1.2)
BUN SERPL-MCNC: 14 MG/DL (ref 6–20)
BUN/CREAT SERPL: 18.2 (ref 7–25)
CALCIUM SPEC-SCNC: 10 MG/DL (ref 8.6–10.5)
CHLORIDE SERPL-SCNC: 104 MMOL/L (ref 98–107)
CO2 SERPL-SCNC: 21.9 MMOL/L (ref 22–29)
CREAT SERPL-MCNC: 0.77 MG/DL (ref 0.76–1.27)
DEPRECATED RDW RBC AUTO: 46.4 FL (ref 37–54)
EGFRCR SERPLBLD CKD-EPI 2021: 127.4 ML/MIN/1.73
EOSINOPHIL # BLD AUTO: 0.41 10*3/MM3 (ref 0–0.4)
EOSINOPHIL NFR BLD AUTO: 3.8 % (ref 0.3–6.2)
ERYTHROCYTE [DISTWIDTH] IN BLOOD BY AUTOMATED COUNT: 14.6 % (ref 12.3–15.4)
GLOBULIN UR ELPH-MCNC: 2.9 GM/DL
GLUCOSE SERPL-MCNC: 79 MG/DL (ref 65–99)
HBA1C MFR BLD: 6.4 % (ref 4.8–5.6)
HCT VFR BLD AUTO: 36.8 % (ref 37.5–51)
HGB BLD-MCNC: 12 G/DL (ref 13–17.7)
IMM GRANULOCYTES # BLD AUTO: 0.04 10*3/MM3 (ref 0–0.05)
IMM GRANULOCYTES NFR BLD AUTO: 0.4 % (ref 0–0.5)
LYMPHOCYTES # BLD AUTO: 1.95 10*3/MM3 (ref 0.7–3.1)
LYMPHOCYTES NFR BLD AUTO: 18.2 % (ref 19.6–45.3)
MCH RBC QN AUTO: 28.6 PG (ref 26.6–33)
MCHC RBC AUTO-ENTMCNC: 32.6 G/DL (ref 31.5–35.7)
MCV RBC AUTO: 87.8 FL (ref 79–97)
MONOCYTES # BLD AUTO: 0.97 10*3/MM3 (ref 0.1–0.9)
MONOCYTES NFR BLD AUTO: 9.1 % (ref 5–12)
NEUTROPHILS NFR BLD AUTO: 67.8 % (ref 42.7–76)
NEUTROPHILS NFR BLD AUTO: 7.26 10*3/MM3 (ref 1.7–7)
NRBC BLD AUTO-RTO: 0.1 /100 WBC (ref 0–0.2)
PLATELET # BLD AUTO: 411 10*3/MM3 (ref 140–450)
PMV BLD AUTO: 10.9 FL (ref 6–12)
POTASSIUM SERPL-SCNC: 4.3 MMOL/L (ref 3.5–5.2)
PROT SERPL-MCNC: 6.9 G/DL (ref 6–8.5)
RBC # BLD AUTO: 4.19 10*6/MM3 (ref 4.14–5.8)
SODIUM SERPL-SCNC: 139 MMOL/L (ref 136–145)
WBC NRBC COR # BLD: 10.71 10*3/MM3 (ref 3.4–10.8)

## 2022-05-20 PROCEDURE — 85025 COMPLETE CBC W/AUTO DIFF WBC: CPT

## 2022-05-20 PROCEDURE — 36415 COLL VENOUS BLD VENIPUNCTURE: CPT

## 2022-05-20 PROCEDURE — 99213 OFFICE O/P EST LOW 20 MIN: CPT | Performed by: NURSE PRACTITIONER

## 2022-05-20 PROCEDURE — 83036 HEMOGLOBIN GLYCOSYLATED A1C: CPT

## 2022-05-20 PROCEDURE — 80053 COMPREHEN METABOLIC PANEL: CPT

## 2022-05-20 RX ORDER — POLYETHYLENE GLYCOL-3350 AND ELECTROLYTES 236; 6.74; 5.86; 2.97; 22.74 G/274.31G; G/274.31G; G/274.31G; G/274.31G; G/274.31G
POWDER, FOR SOLUTION ORAL
COMMUNITY
Start: 2022-03-16 | End: 2022-09-30

## 2022-05-20 RX ORDER — ALBUTEROL SULFATE 2.5 MG/3ML
2.5 SOLUTION RESPIRATORY (INHALATION) EVERY 4 HOURS PRN
Qty: 120 EACH | Refills: 5
Start: 2022-05-20 | End: 2022-09-30 | Stop reason: SDUPTHER

## 2022-05-20 RX ORDER — FORMOTEROL FUMARATE 20 UG/2ML
20 SOLUTION RESPIRATORY (INHALATION)
Qty: 60 EACH | Refills: 5
Start: 2022-05-20 | End: 2022-09-09

## 2022-05-20 RX ORDER — BUDESONIDE 0.5 MG/2ML
0.5 INHALANT ORAL 2 TIMES DAILY
Qty: 60 EACH | Refills: 5
Start: 2022-05-20 | End: 2022-09-09

## 2022-05-20 RX ORDER — PANCRELIPASE 30000; 6000; 19000 [USP'U]/1; [USP'U]/1; [USP'U]/1
6000 CAPSULE, DELAYED RELEASE PELLETS ORAL
Qty: 480 CAPSULE | Refills: 3 | Status: SHIPPED | OUTPATIENT
Start: 2022-05-20 | End: 2022-10-04 | Stop reason: SDUPTHER

## 2022-05-20 NOTE — PROGRESS NOTES
Primary Care Provider  Provider, No Known   Referring Provider  No ref. provider found    Patient Complaint  Follow-up, Med Refill, and Cough (productive)      SUBJECTIVE    History of Presenting Illness  Marcus Jensen is a pleasant 26 y.o. male who presents to [facility] for follow-up appointment.  Mr. Jensen  has a diagnosis of cystic fibrosis from childhood and was followed at [facility] cystic fibrosis clinic until several years back when he was transition to adult clinic.  He has a history of having MSSA pneumonia and acute hypoxic respiratory failure in 2019.  His last pulmonary function test was in 2019. And his last CT scan of his chest was 1/15/2020.  Patient has had several CT scans ordered but has not been compliant in getting them done.  States he has not had a working telephone.    At present time patient denies having any dyspnea, coughing, wheezing, headaches, chest pain, weight loss or hemoptysis. Denies fevers, chills and night sweats.  Does continue to chew tobacco and use marijuana daily.    He is needing a refill on his Creon today as well as his nebulizers.     Marcus Jensen is able to perform ADLs without difficulties and denies any swollen glands/lymph nodes in the head or neck.    I have personally reviewed the review of systems, past family, social, medical and surgical histories; and agree with their findings.    Review of Systems   Constitutional: Negative.    HENT: Negative.    Respiratory: Negative.    Cardiovascular: Negative.    Gastrointestinal: Negative.    Musculoskeletal: Negative.    Skin: Negative.         Family History   Problem Relation Age of Onset   • Asthma Sister         Social History     Socioeconomic History   • Marital status:    Tobacco Use   • Smoking status: Never Smoker   • Smokeless tobacco: Current User     Types: Chew   Vaping Use   • Vaping Use: Never used   Substance and Sexual  Activity   • Alcohol use: Not Currently   • Drug use: Defer   • Sexual activity: Defer        Past Medical History:   Diagnosis Date   • Cystic fibrosis (HCC)         Immunization History   Administered Date(s) Administered   • DTaP, Unspecified 06/12/1997, 01/28/1998, 08/08/2000   • Hep B, Adolescent or Pediatric 01/28/1998   • HiB 06/12/1997, 01/28/1998   • IPV 06/12/1997, 08/08/2000   • MMR 08/08/2000, 10/09/2000   • Meningococcal Polysaccharide 08/05/2009   • Tdap 08/05/2009       No Known Allergies       Current Outpatient Medications:   •  albuterol (PROVENTIL) (2.5 MG/3ML) 0.083% nebulizer solution, Take 2.5 mg by nebulization Every 4 (Four) Hours As Needed for Wheezing., Disp: 120 each, Rfl: 5  •  budesonide (Pulmicort) 0.5 MG/2ML nebulizer solution, Take 2 mL by nebulization 2 (Two) Times a Day for 30 days., Disp: 60 each, Rfl: 5  •  formoterol (PERFOROMIST) 20 MCG/2ML nebulizer solution, Take 2 mL by nebulization 2 (Two) Times a Day., Disp: 60 each, Rfl: 5  •  GaviLyte-G 236 g solution, , Disp: , Rfl:   •  ketorolac (TORADOL) 10 MG tablet, Take 1 tablet by mouth Every 6 (Six) Hours As Needed for Moderate Pain ., Disp: 15 tablet, Rfl: 0  •  ondansetron ODT (ZOFRAN-ODT) 4 MG disintegrating tablet, Place 1 tablet on the tongue Every 8 (Eight) Hours As Needed for Nausea or Vomiting., Disp: 15 tablet, Rfl: 0  •  pancrelipase, Lip-Prot-Amyl, (Creon) 6000-55664 units capsule delayed-release particles capsule, Take 1 capsule by mouth 3 (Three) Times a Day With Meals. And two capsules with each snack, Disp: 480 capsule, Rfl: 3  •  polyethylene glycol (MIRALAX) 17 g packet, Take 17 g by mouth 2 (Two) Times a Day for 90 days., Disp: 180 packet, Rfl: 2  •  revefenacin (YUPELRI) 175 MCG/3ML nebulizer solution, Take 3 mL by nebulization Daily., Disp: 90 mL, Rfl: 5       OBJECTIVE    Vital Signs   /74 (BP Location: Right arm, Patient Position: Sitting, Cuff Size: Adult)   Pulse 75   Temp 98.9 °F (37.2 °C)  "(Temporal)   Resp 14   Ht 154.9 cm (61\")   Wt 52.3 kg (115 lb 3.2 oz)   SpO2 98% Comment: room air  BMI 21.77 kg/m²     Physical Exam  Vitals reviewed.   Constitutional:       Appearance: Normal appearance.   HENT:      Head: Normocephalic and atraumatic.      Nose: Nose normal.      Mouth/Throat:      Mouth: Mucous membranes are moist.      Pharynx: Oropharynx is clear.   Eyes:      Extraocular Movements: Extraocular movements intact.      Conjunctiva/sclera: Conjunctivae normal.      Pupils: Pupils are equal, round, and reactive to light.   Cardiovascular:      Rate and Rhythm: Normal rate and regular rhythm.      Pulses: Normal pulses.      Heart sounds: Normal heart sounds.   Pulmonary:      Effort: Pulmonary effort is normal.      Breath sounds: Normal breath sounds.   Abdominal:      General: Bowel sounds are normal.   Musculoskeletal:         General: Normal range of motion.      Cervical back: Normal range of motion and neck supple.   Skin:     General: Skin is warm and dry.   Neurological:      General: No focal deficit present.      Mental Status: He is alert and oriented to person, place, and time.   Psychiatric:         Mood and Affect: Mood normal.         Behavior: Behavior normal.          Results Review  I have personally reviewed the office notes and past diagnostics.      ASSESSMENT & PLAN    Patient Active Problem List   Diagnosis   • Cystic fibrosis with pulmonary manifestations (HCC)   • Bronchiectasis without acute exacerbation (HCC)       Diagnoses and all orders for this visit:    1. Cystic fibrosis with pulmonary manifestations (HCC) (Primary)  -     CT Chest Hi Resolution Diagnostic; Future  -     6 Minute Walk Test; Future  -     Pulmonary Function Test; Future  -     CBC & Differential; Future  -     Comprehensive Metabolic Panel; Future  -     albuterol (PROVENTIL) (2.5 MG/3ML) 0.083% nebulizer solution; Take 2.5 mg by nebulization Every 4 (Four) Hours As Needed for Wheezing.  " Dispense: 120 each; Refill: 5  -     budesonide (Pulmicort) 0.5 MG/2ML nebulizer solution; Take 2 mL by nebulization 2 (Two) Times a Day for 30 days.  Dispense: 60 each; Refill: 5  -     formoterol (PERFOROMIST) 20 MCG/2ML nebulizer solution; Take 2 mL by nebulization 2 (Two) Times a Day.  Dispense: 60 each; Refill: 5  -     revefenacin (YUPELRI) 175 MCG/3ML nebulizer solution; Take 3 mL by nebulization Daily.  Dispense: 90 mL; Refill: 5  -     Hemoglobin A1c; Future  -     pancrelipase, Lip-Prot-Amyl, (Creon) 6000-72005 units capsule delayed-release particles capsule; Take 1 capsule by mouth 3 (Three) Times a Day With Meals. And two capsules with each snack  Dispense: 480 capsule; Refill: 3    2. Bronchiectasis with acute exacerbation (HCC)  -     CT Chest Hi Resolution Diagnostic; Future  -     6 Minute Walk Test; Future  -     Pulmonary Function Test; Future  -     CBC & Differential; Future  -     Comprehensive Metabolic Panel; Future  -     albuterol (PROVENTIL) (2.5 MG/3ML) 0.083% nebulizer solution; Take 2.5 mg by nebulization Every 4 (Four) Hours As Needed for Wheezing.  Dispense: 120 each; Refill: 5  -     budesonide (Pulmicort) 0.5 MG/2ML nebulizer solution; Take 2 mL by nebulization 2 (Two) Times a Day for 30 days.  Dispense: 60 each; Refill: 5  -     formoterol (PERFOROMIST) 20 MCG/2ML nebulizer solution; Take 2 mL by nebulization 2 (Two) Times a Day.  Dispense: 60 each; Refill: 5  -     revefenacin (YUPELRI) 175 MCG/3ML nebulizer solution; Take 3 mL by nebulization Daily.  Dispense: 90 mL; Refill: 5  -     Hemoglobin A1c; Future    3. Poor compliance with medication           Plan:  Discussed in detail with patient his lung function need for diagnostics to evaluate his pulmonary fibrosis.  We will proceed with ordering a CT scan high-resolution, a pulmonary function test with a 6-minute walk and lab work today including A1c a CBC and a CMP.  Refills of patient's medicines today including Yupelri his  Perforomist his Proventil and Creon.  Patient to follow-up in 3 months or sooner if needed to review results of his test results.    Smoking status: Current  Vaccination status: Declines all vaccination  Medications personally reviewed    Follow Up  Return in about 3 months (around 8/20/2022).    Patient was given instructions and counseling regarding his condition or for health maintenance advice. Please see specific information pulled into the AVS if appropriate.

## 2022-05-23 NOTE — PROGRESS NOTES
Please let patient know that he has some abnormal labs. His A1c is elevated at 6.4, diabetic. He needs to be seen by his PCP and get started on medication. Please find out who he sees, and facilitate getting him an appointment. Thanks, Bernie

## 2022-05-25 ENCOUNTER — TELEPHONE (OUTPATIENT)
Dept: PULMONOLOGY | Facility: CLINIC | Age: 27
End: 2022-05-25

## 2022-05-25 NOTE — TELEPHONE ENCOUNTER
----- Message from Sneha Esteves MA sent at 5/23/2022  3:13 PM EDT -----  Call pt to give test results.    Tried calling pt 4 different times with no luck, will send letter.

## 2022-06-08 ENCOUNTER — TELEPHONE (OUTPATIENT)
Dept: PULMONOLOGY | Facility: CLINIC | Age: 27
End: 2022-06-08

## 2022-09-09 DIAGNOSIS — J47.1 BRONCHIECTASIS WITH ACUTE EXACERBATION: ICD-10-CM

## 2022-09-09 DIAGNOSIS — E84.0 CYSTIC FIBROSIS WITH PULMONARY MANIFESTATIONS: ICD-10-CM

## 2022-09-09 RX ORDER — FORMOTEROL FUMARATE DIHYDRATE 20 UG/2ML
SOLUTION RESPIRATORY (INHALATION)
Qty: 120 ML | Refills: 11 | Status: SHIPPED | OUTPATIENT
Start: 2022-09-09 | End: 2022-09-30 | Stop reason: SDUPTHER

## 2022-09-09 RX ORDER — BUDESONIDE 0.5 MG/2ML
INHALANT ORAL
Qty: 120 ML | Refills: 11 | Status: SHIPPED | OUTPATIENT
Start: 2022-09-09 | End: 2022-09-30 | Stop reason: SDUPTHER

## 2022-09-28 NOTE — PROGRESS NOTES
Primary Care Provider  Provider, No Known   Referring Provider  No ref. provider found    Patient Complaint  No chief complaint on file.      SUBJECTIVE    History of Presenting Illness  Marcus Jensen is a pleasant 27 y.o. male who presents to [facility] for ***    Denies dyspnea, coughing, wheezing, headaches, chest pain, weight loss or hemoptysis. Denies fevers, chills and night sweats. Marcus Jensen is able to perform ADLs without difficulties and denies any swollen glands/lymph nodes in the head or neck.    I have personally reviewed the review of systems, past family, social, medical and surgical histories; and agree with their findings.    Review of Systems     Family History   Problem Relation Age of Onset   • Asthma Sister         Social History     Socioeconomic History   • Marital status:    Tobacco Use   • Smoking status: Never Smoker   • Smokeless tobacco: Current User     Types: Chew   Vaping Use   • Vaping Use: Never used   Substance and Sexual Activity   • Alcohol use: Not Currently   • Drug use: Defer   • Sexual activity: Defer        Past Medical History:   Diagnosis Date   • Cystic fibrosis (HCC)         Immunization History   Administered Date(s) Administered   • DTaP, Unspecified 06/12/1997, 01/28/1998, 08/08/2000   • Hep B, Adolescent or Pediatric 01/28/1998   • HiB 06/12/1997, 01/28/1998   • IPV 06/12/1997, 08/08/2000   • MMR 08/08/2000, 10/09/2000   • Meningococcal Polysaccharide 08/05/2009   • Tdap 08/05/2009       No Known Allergies       Current Outpatient Medications:   •  albuterol (PROVENTIL) (2.5 MG/3ML) 0.083% nebulizer solution, Take 2.5 mg by nebulization Every 4 (Four) Hours As Needed for Wheezing., Disp: 120 each, Rfl: 5  •  budesonide (PULMICORT) 0.5 MG/2ML nebulizer solution, USE 1 VIAL  IN  NEBULIZER TWICE  DAILY - rinse mouth after treatment, Disp: 120 mL, Rfl: 11  •  GaviLyte-G 236 g solution, , Disp: , Rfl:   •   ketorolac (TORADOL) 10 MG tablet, Take 1 tablet by mouth Every 6 (Six) Hours As Needed for Moderate Pain ., Disp: 15 tablet, Rfl: 0  •  ondansetron ODT (ZOFRAN-ODT) 4 MG disintegrating tablet, Place 1 tablet on the tongue Every 8 (Eight) Hours As Needed for Nausea or Vomiting., Disp: 15 tablet, Rfl: 0  •  pancrelipase, Lip-Prot-Amyl, (Creon) 6000-67080 units capsule delayed-release particles capsule, Take 1 capsule by mouth 3 (Three) Times a Day With Meals. And two capsules with each snack, Disp: 480 capsule, Rfl: 3  •  Perforomist 20 MCG/2ML nebulizer solution, USE 1 VIAL  IN  NEBULIZER TWICE  DAILY - morning and evening, Disp: 120 mL, Rfl: 11  •  revefenacin (YUPELRI) 175 MCG/3ML nebulizer solution, Take 3 mL by nebulization Daily., Disp: 90 mL, Rfl: 5       OBJECTIVE    Vital Signs   There were no vitals taken for this visit.    Physical Exam     Results Review  I have personally reviewed the ***      ASSESSMENT & PLAN    Patient Active Problem List   Diagnosis   • Cystic fibrosis with pulmonary manifestations (HCC)   • Bronchiectasis without acute exacerbation (HCC)       There are no diagnoses linked to this encounter.       Plan:  ***    Smoking status:***  Vaccination status:***  Medications personally reviewed    Follow Up  No follow-ups on file.    Patient was given instructions and counseling regarding his condition or for health maintenance advice. Please see specific information pulled into the AVS if appropriate.

## 2022-09-30 ENCOUNTER — HOSPITAL ENCOUNTER (INPATIENT)
Facility: HOSPITAL | Age: 27
LOS: 3 days | Discharge: HOME | DRG: 871 | End: 2022-10-03
Attending: EMERGENCY MEDICINE | Admitting: INTERNAL MEDICINE
Payer: COMMERCIAL

## 2022-09-30 ENCOUNTER — OFFICE VISIT (OUTPATIENT)
Dept: PULMONOLOGY | Facility: CLINIC | Age: 27
End: 2022-09-30

## 2022-09-30 ENCOUNTER — APPOINTMENT (OUTPATIENT)
Dept: GENERAL RADIOLOGY | Facility: HOSPITAL | Age: 27
DRG: 871 | End: 2022-09-30
Payer: COMMERCIAL

## 2022-09-30 VITALS
OXYGEN SATURATION: 89 % | WEIGHT: 103 LBS | DIASTOLIC BLOOD PRESSURE: 66 MMHG | HEART RATE: 133 BPM | HEIGHT: 61 IN | SYSTOLIC BLOOD PRESSURE: 108 MMHG | RESPIRATION RATE: 22 BRPM | BODY MASS INDEX: 19.45 KG/M2 | TEMPERATURE: 102 F

## 2022-09-30 DIAGNOSIS — J18.9 MULTIFOCAL PNEUMONIA: ICD-10-CM

## 2022-09-30 DIAGNOSIS — A41.9 SEPSIS, DUE TO UNSPECIFIED ORGANISM, UNSPECIFIED WHETHER ACUTE ORGAN DYSFUNCTION PRESENT: Primary | ICD-10-CM

## 2022-09-30 DIAGNOSIS — E84.0 CYSTIC FIBROSIS WITH PULMONARY MANIFESTATIONS: ICD-10-CM

## 2022-09-30 DIAGNOSIS — J47.1 BRONCHIECTASIS WITH ACUTE EXACERBATION: ICD-10-CM

## 2022-09-30 LAB
ALBUMIN SERPL-MCNC: 3.5 G/DL (ref 3.5–5.2)
ALBUMIN/GLOB SERPL: 0.8 G/DL
ALP SERPL-CCNC: 180 U/L (ref 39–117)
ALT SERPL W P-5'-P-CCNC: 32 U/L (ref 1–41)
ANION GAP SERPL CALCULATED.3IONS-SCNC: 13 MMOL/L (ref 5–15)
AST SERPL-CCNC: 18 U/L (ref 1–40)
BASOPHILS # BLD AUTO: 0.1 10*3/MM3 (ref 0–0.2)
BASOPHILS NFR BLD AUTO: 0.6 % (ref 0–1.5)
BILIRUB SERPL-MCNC: 0.6 MG/DL (ref 0–1.2)
BILIRUB UR QL STRIP: NEGATIVE
BUN SERPL-MCNC: 9 MG/DL (ref 6–20)
BUN/CREAT SERPL: 9.7 (ref 7–25)
CALCIUM SPEC-SCNC: 8.9 MG/DL (ref 8.6–10.5)
CHLORIDE SERPL-SCNC: 93 MMOL/L (ref 98–107)
CLARITY UR: CLEAR
CO2 SERPL-SCNC: 26 MMOL/L (ref 22–29)
COLOR UR: YELLOW
CREAT SERPL-MCNC: 0.93 MG/DL (ref 0.76–1.27)
D-LACTATE SERPL-SCNC: 1.3 MMOL/L (ref 0.5–2)
DEPRECATED RDW RBC AUTO: 39.1 FL (ref 37–54)
EGFRCR SERPLBLD CKD-EPI 2021: 116.1 ML/MIN/1.73
EOSINOPHIL # BLD AUTO: 0.03 10*3/MM3 (ref 0–0.4)
EOSINOPHIL NFR BLD AUTO: 0.2 % (ref 0.3–6.2)
ERYTHROCYTE [DISTWIDTH] IN BLOOD BY AUTOMATED COUNT: 13.2 % (ref 12.3–15.4)
FLUAV AG NPH QL: NEGATIVE
FLUBV AG NPH QL IA: NEGATIVE
GLOBULIN UR ELPH-MCNC: 4.5 GM/DL
GLUCOSE SERPL-MCNC: 190 MG/DL (ref 65–99)
GLUCOSE UR STRIP-MCNC: NEGATIVE MG/DL
HCT VFR BLD AUTO: 40.1 % (ref 37.5–51)
HGB BLD-MCNC: 13.7 G/DL (ref 13–17.7)
HGB UR QL STRIP.AUTO: NEGATIVE
HOLD SPECIMEN: NORMAL
HOLD SPECIMEN: NORMAL
IMM GRANULOCYTES # BLD AUTO: 0.08 10*3/MM3 (ref 0–0.05)
IMM GRANULOCYTES NFR BLD AUTO: 0.5 % (ref 0–0.5)
KETONES UR QL STRIP: NEGATIVE
LEUKOCYTE ESTERASE UR QL STRIP.AUTO: NEGATIVE
LYMPHOCYTES # BLD AUTO: 2.91 10*3/MM3 (ref 0.7–3.1)
LYMPHOCYTES NFR BLD AUTO: 17.1 % (ref 19.6–45.3)
MCH RBC QN AUTO: 27.8 PG (ref 26.6–33)
MCHC RBC AUTO-ENTMCNC: 34.2 G/DL (ref 31.5–35.7)
MCV RBC AUTO: 81.5 FL (ref 79–97)
MONOCYTES # BLD AUTO: 1.58 10*3/MM3 (ref 0.1–0.9)
MONOCYTES NFR BLD AUTO: 9.3 % (ref 5–12)
NEUTROPHILS NFR BLD AUTO: 12.3 10*3/MM3 (ref 1.7–7)
NEUTROPHILS NFR BLD AUTO: 72.3 % (ref 42.7–76)
NITRITE UR QL STRIP: NEGATIVE
NRBC BLD AUTO-RTO: 0 /100 WBC (ref 0–0.2)
PH UR STRIP.AUTO: 6 [PH] (ref 5–8)
PLATELET # BLD AUTO: 474 10*3/MM3 (ref 140–450)
PMV BLD AUTO: 9.6 FL (ref 6–12)
POTASSIUM SERPL-SCNC: 3.5 MMOL/L (ref 3.5–5.2)
PROT SERPL-MCNC: 8 G/DL (ref 6–8.5)
PROT UR QL STRIP: NEGATIVE
RBC # BLD AUTO: 4.92 10*6/MM3 (ref 4.14–5.8)
S PYO AG THROAT QL: NEGATIVE
SODIUM SERPL-SCNC: 132 MMOL/L (ref 136–145)
SP GR UR STRIP: 1.01 (ref 1–1.03)
UROBILINOGEN UR QL STRIP: NORMAL
WBC NRBC COR # BLD: 17 10*3/MM3 (ref 3.4–10.8)
WHOLE BLOOD HOLD COAG: NORMAL
WHOLE BLOOD HOLD SPECIMEN: NORMAL

## 2022-09-30 PROCEDURE — 99284 EMERGENCY DEPT VISIT MOD MDM: CPT

## 2022-09-30 PROCEDURE — 85025 COMPLETE CBC W/AUTO DIFF WBC: CPT | Performed by: EMERGENCY MEDICINE

## 2022-09-30 PROCEDURE — 80053 COMPREHEN METABOLIC PANEL: CPT | Performed by: EMERGENCY MEDICINE

## 2022-09-30 PROCEDURE — 87804 INFLUENZA ASSAY W/OPTIC: CPT | Performed by: NURSE PRACTITIONER

## 2022-09-30 PROCEDURE — 25010000002 TOBRAMYCIN PER 80 MG: Performed by: EMERGENCY MEDICINE

## 2022-09-30 PROCEDURE — 36415 COLL VENOUS BLD VENIPUNCTURE: CPT | Performed by: EMERGENCY MEDICINE

## 2022-09-30 PROCEDURE — 71046 X-RAY EXAM CHEST 2 VIEWS: CPT

## 2022-09-30 PROCEDURE — U0004 COV-19 TEST NON-CDC HGH THRU: HCPCS | Performed by: NURSE PRACTITIONER

## 2022-09-30 PROCEDURE — 99223 1ST HOSP IP/OBS HIGH 75: CPT | Performed by: INTERNAL MEDICINE

## 2022-09-30 PROCEDURE — 87880 STREP A ASSAY W/OPTIC: CPT | Performed by: NURSE PRACTITIONER

## 2022-09-30 PROCEDURE — 25010000002 VANCOMYCIN 5 G RECONSTITUTED SOLUTION: Performed by: EMERGENCY MEDICINE

## 2022-09-30 PROCEDURE — 0 CEFTRIAXONE PER 250 MG: Performed by: EMERGENCY MEDICINE

## 2022-09-30 PROCEDURE — 93010 ELECTROCARDIOGRAM REPORT: CPT | Performed by: INTERNAL MEDICINE

## 2022-09-30 PROCEDURE — 87040 BLOOD CULTURE FOR BACTERIA: CPT | Performed by: EMERGENCY MEDICINE

## 2022-09-30 PROCEDURE — 87081 CULTURE SCREEN ONLY: CPT | Performed by: NURSE PRACTITIONER

## 2022-09-30 PROCEDURE — 25010000002 CEFEPIME PER 500 MG: Performed by: EMERGENCY MEDICINE

## 2022-09-30 PROCEDURE — 81003 URINALYSIS AUTO W/O SCOPE: CPT | Performed by: NURSE PRACTITIONER

## 2022-09-30 PROCEDURE — 83605 ASSAY OF LACTIC ACID: CPT | Performed by: EMERGENCY MEDICINE

## 2022-09-30 PROCEDURE — 93005 ELECTROCARDIOGRAM TRACING: CPT | Performed by: NURSE PRACTITIONER

## 2022-09-30 RX ORDER — ALBUTEROL SULFATE 2.5 MG/3ML
2.5 SOLUTION RESPIRATORY (INHALATION) EVERY 4 HOURS PRN
Qty: 120 EACH | Refills: 5
Start: 2022-09-30

## 2022-09-30 RX ORDER — CEFTRIAXONE SODIUM 2 G/50ML
2 INJECTION, SOLUTION INTRAVENOUS ONCE
Status: COMPLETED | OUTPATIENT
Start: 2022-09-30 | End: 2022-09-30

## 2022-09-30 RX ORDER — SODIUM CHLORIDE, SODIUM LACTATE, POTASSIUM CHLORIDE, CALCIUM CHLORIDE 600; 310; 30; 20 MG/100ML; MG/100ML; MG/100ML; MG/100ML
100 INJECTION, SOLUTION INTRAVENOUS CONTINUOUS
Status: DISCONTINUED | OUTPATIENT
Start: 2022-10-01 | End: 2022-10-02

## 2022-09-30 RX ORDER — ACETAMINOPHEN 325 MG/1
650 TABLET ORAL EVERY 4 HOURS PRN
Status: DISCONTINUED | OUTPATIENT
Start: 2022-09-30 | End: 2022-10-03 | Stop reason: HOSPADM

## 2022-09-30 RX ORDER — SODIUM CHLORIDE 0.9 % (FLUSH) 0.9 %
10 SYRINGE (ML) INJECTION AS NEEDED
Status: DISCONTINUED | OUTPATIENT
Start: 2022-09-30 | End: 2022-10-03 | Stop reason: HOSPADM

## 2022-09-30 RX ORDER — ACETAMINOPHEN 650 MG/1
650 SUPPOSITORY RECTAL EVERY 4 HOURS PRN
Status: DISCONTINUED | OUTPATIENT
Start: 2022-09-30 | End: 2022-10-03 | Stop reason: HOSPADM

## 2022-09-30 RX ORDER — SODIUM CHLORIDE 0.9 % (FLUSH) 0.9 %
10 SYRINGE (ML) INJECTION EVERY 12 HOURS SCHEDULED
Status: DISCONTINUED | OUTPATIENT
Start: 2022-10-01 | End: 2022-10-03 | Stop reason: HOSPADM

## 2022-09-30 RX ORDER — FORMOTEROL FUMARATE 20 UG/2ML
20 SOLUTION RESPIRATORY (INHALATION)
Qty: 120 ML | Refills: 11 | Status: SHIPPED | OUTPATIENT
Start: 2022-09-30

## 2022-09-30 RX ORDER — SODIUM CHLORIDE 9 MG/ML
40 INJECTION, SOLUTION INTRAVENOUS AS NEEDED
Status: DISCONTINUED | OUTPATIENT
Start: 2022-09-30 | End: 2022-10-03 | Stop reason: HOSPADM

## 2022-09-30 RX ORDER — BUDESONIDE 0.5 MG/2ML
0.5 INHALANT ORAL 2 TIMES DAILY
Status: DISCONTINUED | OUTPATIENT
Start: 2022-10-01 | End: 2022-10-03 | Stop reason: HOSPADM

## 2022-09-30 RX ORDER — IBUPROFEN 600 MG/1
600 TABLET ORAL ONCE
Status: COMPLETED | OUTPATIENT
Start: 2022-09-30 | End: 2022-09-30

## 2022-09-30 RX ORDER — NITROGLYCERIN 0.4 MG/1
0.4 TABLET SUBLINGUAL
Status: DISCONTINUED | OUTPATIENT
Start: 2022-09-30 | End: 2022-10-03 | Stop reason: HOSPADM

## 2022-09-30 RX ORDER — HEPARIN SODIUM 5000 [USP'U]/ML
5000 INJECTION, SOLUTION INTRAVENOUS; SUBCUTANEOUS EVERY 8 HOURS SCHEDULED
Status: DISCONTINUED | OUTPATIENT
Start: 2022-10-01 | End: 2022-10-03 | Stop reason: HOSPADM

## 2022-09-30 RX ORDER — ONDANSETRON 4 MG/1
4 TABLET, ORALLY DISINTEGRATING ORAL EVERY 8 HOURS PRN
Status: DISCONTINUED | OUTPATIENT
Start: 2022-09-30 | End: 2022-10-03 | Stop reason: HOSPADM

## 2022-09-30 RX ORDER — ALBUTEROL SULFATE 2.5 MG/3ML
2.5 SOLUTION RESPIRATORY (INHALATION) EVERY 4 HOURS PRN
Status: DISCONTINUED | OUTPATIENT
Start: 2022-09-30 | End: 2022-10-01

## 2022-09-30 RX ORDER — ARFORMOTEROL TARTRATE 15 UG/2ML
15 SOLUTION RESPIRATORY (INHALATION)
Status: DISCONTINUED | OUTPATIENT
Start: 2022-10-01 | End: 2022-10-03 | Stop reason: HOSPADM

## 2022-09-30 RX ORDER — BENZONATATE 100 MG/1
200 CAPSULE ORAL ONCE
Status: COMPLETED | OUTPATIENT
Start: 2022-09-30 | End: 2022-09-30

## 2022-09-30 RX ORDER — BUDESONIDE 0.5 MG/2ML
0.5 INHALANT ORAL 2 TIMES DAILY
Qty: 120 ML | Refills: 11 | Status: SHIPPED | OUTPATIENT
Start: 2022-09-30

## 2022-09-30 RX ORDER — ACETAMINOPHEN 160 MG/5ML
650 SOLUTION ORAL EVERY 4 HOURS PRN
Status: DISCONTINUED | OUTPATIENT
Start: 2022-09-30 | End: 2022-10-03 | Stop reason: HOSPADM

## 2022-09-30 RX ADMIN — BENZONATATE 200 MG: 100 CAPSULE ORAL at 22:45

## 2022-09-30 RX ADMIN — CEFEPIME HYDROCHLORIDE 2 G: 2 INJECTION, POWDER, FOR SOLUTION INTRAMUSCULAR; INTRAVENOUS at 20:23

## 2022-09-30 RX ADMIN — IBUPROFEN 600 MG: 600 TABLET, FILM COATED ORAL at 18:06

## 2022-09-30 RX ADMIN — Medication 1000 MG: at 22:43

## 2022-09-30 RX ADMIN — CEFTRIAXONE SODIUM 2 G: 2 INJECTION, SOLUTION INTRAVENOUS at 18:13

## 2022-09-30 RX ADMIN — SODIUM CHLORIDE 1440 ML: 9 INJECTION, SOLUTION INTRAVENOUS at 18:00

## 2022-09-30 RX ADMIN — TOBRAMYCIN 340 MG: 40 INJECTION INTRAMUSCULAR; INTRAVENOUS at 21:22

## 2022-09-30 NOTE — PROGRESS NOTES
Patient presents to office with temp 102, tachycardic, coughing up thick milky white mucus. Family member states he has laid around for the past two days, lethargic, no energy. Patient advised to go to the ED for further evaluation. Stressed to patient due to his cystic fibrosis he needed urgent care and intervention at this time.

## 2022-10-01 ENCOUNTER — APPOINTMENT (OUTPATIENT)
Dept: CT IMAGING | Facility: HOSPITAL | Age: 27
DRG: 871 | End: 2022-10-01
Payer: COMMERCIAL

## 2022-10-01 LAB
ALBUMIN SERPL-MCNC: 2.7 G/DL (ref 3.5–5.2)
ALBUMIN/GLOB SERPL: 0.7 G/DL
ALP SERPL-CCNC: 149 U/L (ref 39–117)
ALT SERPL W P-5'-P-CCNC: 28 U/L (ref 1–41)
ANION GAP SERPL CALCULATED.3IONS-SCNC: 8.7 MMOL/L (ref 5–15)
AST SERPL-CCNC: 23 U/L (ref 1–40)
BASOPHILS # BLD AUTO: 0.1 10*3/MM3 (ref 0–0.2)
BASOPHILS NFR BLD AUTO: 0.6 % (ref 0–1.5)
BILIRUB SERPL-MCNC: 0.4 MG/DL (ref 0–1.2)
BUN SERPL-MCNC: 9 MG/DL (ref 6–20)
BUN/CREAT SERPL: 13.4 (ref 7–25)
CALCIUM SPEC-SCNC: 8.5 MG/DL (ref 8.6–10.5)
CHLORIDE SERPL-SCNC: 101 MMOL/L (ref 98–107)
CO2 SERPL-SCNC: 25.3 MMOL/L (ref 22–29)
CREAT SERPL-MCNC: 0.67 MG/DL (ref 0.76–1.27)
DEPRECATED RDW RBC AUTO: 39.9 FL (ref 37–54)
EGFRCR SERPLBLD CKD-EPI 2021: 132.1 ML/MIN/1.73
EOSINOPHIL # BLD AUTO: 0.1 10*3/MM3 (ref 0–0.4)
EOSINOPHIL NFR BLD AUTO: 0.6 % (ref 0.3–6.2)
ERYTHROCYTE [DISTWIDTH] IN BLOOD BY AUTOMATED COUNT: 13.4 % (ref 12.3–15.4)
GLOBULIN UR ELPH-MCNC: 3.8 GM/DL
GLUCOSE SERPL-MCNC: 153 MG/DL (ref 65–99)
HCT VFR BLD AUTO: 37.8 % (ref 37.5–51)
HGB BLD-MCNC: 13 G/DL (ref 13–17.7)
HOLD SPECIMEN: NORMAL
IMM GRANULOCYTES # BLD AUTO: 0.07 10*3/MM3 (ref 0–0.05)
IMM GRANULOCYTES NFR BLD AUTO: 0.4 % (ref 0–0.5)
L PNEUMO1 AG UR QL IA: NEGATIVE
LYMPHOCYTES # BLD AUTO: 2.3 10*3/MM3 (ref 0.7–3.1)
LYMPHOCYTES NFR BLD AUTO: 14.2 % (ref 19.6–45.3)
MCH RBC QN AUTO: 28 PG (ref 26.6–33)
MCHC RBC AUTO-ENTMCNC: 34.4 G/DL (ref 31.5–35.7)
MCV RBC AUTO: 81.5 FL (ref 79–97)
MONOCYTES # BLD AUTO: 1.36 10*3/MM3 (ref 0.1–0.9)
MONOCYTES NFR BLD AUTO: 8.4 % (ref 5–12)
MRSA DNA SPEC QL NAA+PROBE: NORMAL
NEUTROPHILS NFR BLD AUTO: 12.23 10*3/MM3 (ref 1.7–7)
NEUTROPHILS NFR BLD AUTO: 75.8 % (ref 42.7–76)
NRBC BLD AUTO-RTO: 0 /100 WBC (ref 0–0.2)
PLATELET # BLD AUTO: 424 10*3/MM3 (ref 140–450)
PMV BLD AUTO: 10.5 FL (ref 6–12)
POTASSIUM SERPL-SCNC: 3.8 MMOL/L (ref 3.5–5.2)
PROT SERPL-MCNC: 6.5 G/DL (ref 6–8.5)
QT INTERVAL: 278 MS
RBC # BLD AUTO: 4.64 10*6/MM3 (ref 4.14–5.8)
S PNEUM AG SPEC QL LA: POSITIVE
SARS-COV-2 RNA PNL SPEC NAA+PROBE: NOT DETECTED
SODIUM SERPL-SCNC: 135 MMOL/L (ref 136–145)
TOBRAMYCIN PEAK SERPL-MCNC: 1.96 MCG/ML (ref 5–10)
TOBRAMYCIN RAND SERPL-MCNC: <0.33 MCG/ML (ref 0.5–10)
WBC NRBC COR # BLD: 16.16 10*3/MM3 (ref 3.4–10.8)

## 2022-10-01 PROCEDURE — 63710000001 REVEFENACIN 175 MCG/3ML SOLUTION: Performed by: INTERNAL MEDICINE

## 2022-10-01 PROCEDURE — 87186 SC STD MICRODIL/AGAR DIL: CPT | Performed by: INTERNAL MEDICINE

## 2022-10-01 PROCEDURE — 87070 CULTURE OTHR SPECIMN AEROBIC: CPT | Performed by: INTERNAL MEDICINE

## 2022-10-01 PROCEDURE — 94799 UNLISTED PULMONARY SVC/PX: CPT

## 2022-10-01 PROCEDURE — 25010000002 TOBRAMYCIN PER 80 MG: Performed by: INTERNAL MEDICINE

## 2022-10-01 PROCEDURE — 25010000002 HEPARIN (PORCINE) PER 1000 UNITS: Performed by: INTERNAL MEDICINE

## 2022-10-01 PROCEDURE — 87147 CULTURE TYPE IMMUNOLOGIC: CPT | Performed by: INTERNAL MEDICINE

## 2022-10-01 PROCEDURE — 87641 MR-STAPH DNA AMP PROBE: CPT | Performed by: INTERNAL MEDICINE

## 2022-10-01 PROCEDURE — 25010000002 VANCOMYCIN 5 G RECONSTITUTED SOLUTION: Performed by: INTERNAL MEDICINE

## 2022-10-01 PROCEDURE — 85025 COMPLETE CBC W/AUTO DIFF WBC: CPT | Performed by: INTERNAL MEDICINE

## 2022-10-01 PROCEDURE — 94640 AIRWAY INHALATION TREATMENT: CPT

## 2022-10-01 PROCEDURE — 80053 COMPREHEN METABOLIC PANEL: CPT | Performed by: INTERNAL MEDICINE

## 2022-10-01 PROCEDURE — 83036 HEMOGLOBIN GLYCOSYLATED A1C: CPT | Performed by: INTERNAL MEDICINE

## 2022-10-01 PROCEDURE — 94669 MECHANICAL CHEST WALL OSCILL: CPT

## 2022-10-01 PROCEDURE — 87899 AGENT NOS ASSAY W/OPTIC: CPT | Performed by: INTERNAL MEDICINE

## 2022-10-01 PROCEDURE — 87449 NOS EACH ORGANISM AG IA: CPT | Performed by: INTERNAL MEDICINE

## 2022-10-01 PROCEDURE — 80200 ASSAY OF TOBRAMYCIN: CPT | Performed by: INTERNAL MEDICINE

## 2022-10-01 PROCEDURE — 99223 1ST HOSP IP/OBS HIGH 75: CPT | Performed by: INTERNAL MEDICINE

## 2022-10-01 PROCEDURE — 71250 CT THORAX DX C-: CPT

## 2022-10-01 PROCEDURE — 99233 SBSQ HOSP IP/OBS HIGH 50: CPT | Performed by: INTERNAL MEDICINE

## 2022-10-01 PROCEDURE — 87205 SMEAR GRAM STAIN: CPT | Performed by: INTERNAL MEDICINE

## 2022-10-01 RX ORDER — IPRATROPIUM BROMIDE AND ALBUTEROL SULFATE 2.5; .5 MG/3ML; MG/3ML
3 SOLUTION RESPIRATORY (INHALATION)
Status: DISCONTINUED | OUTPATIENT
Start: 2022-10-01 | End: 2022-10-03 | Stop reason: HOSPADM

## 2022-10-01 RX ORDER — GUAIFENESIN 200 MG/10ML
200 LIQUID ORAL EVERY 6 HOURS PRN
Status: DISCONTINUED | OUTPATIENT
Start: 2022-10-01 | End: 2022-10-03 | Stop reason: HOSPADM

## 2022-10-01 RX ORDER — TOBRAMYCIN INHALATION SOLUTION 300 MG/5ML
300 INHALANT RESPIRATORY (INHALATION)
Status: DISCONTINUED | OUTPATIENT
Start: 2022-10-01 | End: 2022-10-03 | Stop reason: HOSPADM

## 2022-10-01 RX ADMIN — GUAIFENESIN 200 MG: 200 SOLUTION ORAL at 17:03

## 2022-10-01 RX ADMIN — BUDESONIDE 0.5 MG: 0.5 SUSPENSION RESPIRATORY (INHALATION) at 19:45

## 2022-10-01 RX ADMIN — PANCRELIPASE 6000 UNITS OF LIPASE: 30000; 6000; 19000 CAPSULE, DELAYED RELEASE PELLETS ORAL at 12:08

## 2022-10-01 RX ADMIN — Medication 10 ML: at 22:00

## 2022-10-01 RX ADMIN — ACETAMINOPHEN 650 MG: 325 TABLET ORAL at 04:07

## 2022-10-01 RX ADMIN — IPRATROPIUM BROMIDE AND ALBUTEROL SULFATE 3 ML: 2.5; .5 SOLUTION RESPIRATORY (INHALATION) at 12:29

## 2022-10-01 RX ADMIN — HEPARIN SODIUM 5000 UNITS: 5000 INJECTION INTRAVENOUS; SUBCUTANEOUS at 13:30

## 2022-10-01 RX ADMIN — VANCOMYCIN HYDROCHLORIDE 1250 MG: 5 INJECTION, POWDER, LYOPHILIZED, FOR SOLUTION INTRAVENOUS at 21:59

## 2022-10-01 RX ADMIN — ARFORMOTEROL TARTRATE 15 MCG: 15 SOLUTION RESPIRATORY (INHALATION) at 07:01

## 2022-10-01 RX ADMIN — SODIUM CHLORIDE, POTASSIUM CHLORIDE, SODIUM LACTATE AND CALCIUM CHLORIDE 100 ML/HR: 600; 310; 30; 20 INJECTION, SOLUTION INTRAVENOUS at 00:08

## 2022-10-01 RX ADMIN — HEPARIN SODIUM 5000 UNITS: 5000 INJECTION INTRAVENOUS; SUBCUTANEOUS at 22:00

## 2022-10-01 RX ADMIN — REVEFENACIN 175 MCG: 175 SOLUTION RESPIRATORY (INHALATION) at 07:01

## 2022-10-01 RX ADMIN — SODIUM CHLORIDE, POTASSIUM CHLORIDE, SODIUM LACTATE AND CALCIUM CHLORIDE 100 ML/HR: 600; 310; 30; 20 INJECTION, SOLUTION INTRAVENOUS at 08:44

## 2022-10-01 RX ADMIN — ARFORMOTEROL TARTRATE 15 MCG: 15 SOLUTION RESPIRATORY (INHALATION) at 19:45

## 2022-10-01 RX ADMIN — PANCRELIPASE 6000 UNITS OF LIPASE: 30000; 6000; 19000 CAPSULE, DELAYED RELEASE PELLETS ORAL at 08:44

## 2022-10-01 RX ADMIN — PANCRELIPASE 6000 UNITS OF LIPASE: 30000; 6000; 19000 CAPSULE, DELAYED RELEASE PELLETS ORAL at 17:03

## 2022-10-01 RX ADMIN — ACETAMINOPHEN 650 MG: 325 TABLET ORAL at 12:08

## 2022-10-01 RX ADMIN — TOBRAMYCIN 240 MG: 40 INJECTION INTRAMUSCULAR; INTRAVENOUS at 13:29

## 2022-10-01 RX ADMIN — Medication 10 ML: at 01:18

## 2022-10-01 RX ADMIN — TOBRAMYCIN 300 MG: 300 SOLUTION RESPIRATORY (INHALATION) at 16:30

## 2022-10-01 RX ADMIN — ARFORMOTEROL TARTRATE 15 MCG: 15 SOLUTION RESPIRATORY (INHALATION) at 00:45

## 2022-10-01 RX ADMIN — IPRATROPIUM BROMIDE AND ALBUTEROL SULFATE 3 ML: 2.5; .5 SOLUTION RESPIRATORY (INHALATION) at 19:45

## 2022-10-01 RX ADMIN — BUDESONIDE 0.5 MG: 0.5 SUSPENSION RESPIRATORY (INHALATION) at 07:01

## 2022-10-01 RX ADMIN — VANCOMYCIN HYDROCHLORIDE 1250 MG: 5 INJECTION, POWDER, LYOPHILIZED, FOR SOLUTION INTRAVENOUS at 08:44

## 2022-10-01 RX ADMIN — BUDESONIDE 0.5 MG: 0.5 SUSPENSION RESPIRATORY (INHALATION) at 00:45

## 2022-10-01 NOTE — PROGRESS NOTES
"Pharmacy to Dose: TOBRAMYCIN DAY 2  Consulting provider: WILMA  Clinical indication: PNA  Pertinent past medical history: CF    154.9 cm (61\")       10/01/22  0000      Weight: 48 kg (105 lb 13.1 oz)         Estimated Creatinine Clearance: 113.4 mL/min (A) (by C-G formula based on SCr of 0.67 mg/dL (L)).     Other antimicrobial therapy:  VANCOMYCIN, CEFEPIME     Microbiology Results (last 10 days)       Procedure  Component  Value  -  Date/Time    Respiratory Culture - Sputum, Cough [[id number]]  Collected: 10/01/22 0108    Lab Status: Preliminary result  Specimen: Sputum from Cough  Updated: 10/01/22 0223      Gram Stain  Moderate (3+) WBCs seen        Rare (1+) Gram positive bacilli        Rare (1+) Gram positive cocci in pairs and clusters        Occasional Gram negative bacilli    Rapid Strep A Screen - Swab, Throat [[id number]]  (Normal)  Collected: 09/30/22 1746    Lab Status: Final result  Specimen: Swab from Throat  Updated: 09/30/22 1806      Strep A Ag  Negative    Influenza Antigen, Rapid - Swab, Nasopharynx [[id number]]  (Normal)  Collected: 09/30/22 1746    Lab Status: Final result  Specimen: Swab from Nasopharynx  Updated: 09/30/22 1813      Influenza A Ag, EIA  Negative      Influenza B Ag, EIA  Negative    COVID-19,APTIMA PANTHER(KOURTNEY),[facility], NP/OP SWAB IN UTM/VTM/SALINE TRANSPORT MEDIA,24 HR TAT - Swab, Nasopharynx [[id number]]  (Normal)  Collected: 09/30/22 1746    Lab Status: Final result  Specimen: Swab from Nasopharynx  Updated: 10/01/22 0052      COVID19  Not Detected    Narrative:      Fact sheet for providers: https://www.fda.gov/media/700518/download     Fact sheet for patients: https://www.fda.gov/media/592235/download    Test performed by RT PCR.    Beta Strep Culture, Throat - Swab, Throat [[id number]]  (Normal)  Collected: 09/30/22 1746    Lab Status: Preliminary result  Specimen: Swab from Throat  Updated: 10/01/22 0916      Throat Culture, Beta Strep  No Beta Hemolytic Streptococcus " Isolated    Narrative:      Group A Strep incidence is low in adults. Positive culture for Beta hemolytic Streptococcus species can reflect colonization and not true infection. Please correlate clinically.         Assessment/Plan:  Patient meets established criteria for extended interval aminoglycoside dosing per protocol (CrCl > 20ml/min, negative for exclusion criteria).  TOBRAMYCIN dosed at 340 mg (~7 mg/kg) IV once at consult.    LEVEL~4.5 HOURS AFTER INFUSION STARTED RESULTED AS 1.96  LEVEL~13 HOURS AFTER INFUSION STARTED RESULTED AS <0.33    WILL PROVIDE TOBRAMYCIN AT 5MG/KG (240MG) [id number].    BMP TOMORROW

## 2022-10-01 NOTE — PROGRESS NOTES
[facility]   Hospitalist Progress Note  Date: 10/1/2022  Patient Name: Marcus Jensen  : 1995  MRN: [id number]  Date of admission: 2022      Subjective   Subjective     Chief Complaint: Fever    Summary: 27 y.o. male past medical history of cystic fibrosis and TBI that presents to the emergency department from pulmonology clinic for evaluation of fever and tachycardia.  Patient was being seen in the outpatient setting for regularly scheduled visit and vital signs were noted.  In the emergency department found to be febrile with tachycardia and a leukocytosis of 17,000.  Patient actually denies feeling febrile, chills, sweats, nausea, vomiting, chest pain, shortness of breath, palpitations, Lon pain diarrhea constipation, dysuria, weakness, rash.  However, he was found to have low oxygen saturation requiring nasal cannula to maintain sats greater than 90%.  Pulmonology called and recommend broad-spectrum antibiotics and to admit for ongoing monitoring and management    Interval Followup: No acute events overnight.  Patient states he is starting to feel little better from admission.  Febrile overnight to 100.6.  Denies chest pain.  Still has intermittent cough and shortness of breath.    Review of Systems   All systems reviewed and negative unless otherwise stated under interval follow-up    Objective   Objective     Vitals:   Temp:  [98.1 °F (36.7 °C)-100.6 °F (38.1 °C)] 99.8 °F (37.7 °C)  Heart Rate:  [] 105  Resp:  [12-22] 18  BP: ()/(69-79) 132/79  Physical Exam    Constitutional: Awake, alert, no acute distress   Eyes: Pupils equal, sclerae anicteric, no conjunctival injection   HENT: NCAT, mucous membranes moist   Neck: Supple, no thyromegaly, no lymphadenopathy, trachea midline   Respiratory: Clear to auscultation bilaterally, nonlabored respirations    Cardiovascular: RRR, no murmurs, rubs, or gallops   Gastrointestinal: Positive bowel sounds, soft, nontender,  nondistended   Musculoskeletal: No bilateral ankle edema, no clubbing or cyanosis to extremities   Psychiatric: Appropriate affect, cooperative   Neurologic: Oriented x 3, strength symmetric in all extremities, Cranial Nerves grossly intact to confrontation, speech clear   Skin: No rashes     Result Review    Result Review:  I have personally reviewed the results from the time of this admission to 10/1/2022 14:41 EDT and agree with these findings:  [x]  Laboratory all labs reviewed  []  Microbiology  []  Radiology  [x]  EKG/Telemetry telemetry reviewed showing sinus tachycardia  []  Cardiology/Vascular   []  Pathology  []  Old records  []  Other:  CBC    CBC 5/20/22 9/30/22 10/1/22   WBC 10.71 17.00 (A) 16.16 (A)   RBC 4.19 4.92 4.64   Hemoglobin 12.0 (A) 13.7 13.0   Hematocrit 36.8 (A) 40.1 37.8   MCV 87.8 81.5 81.5   MCH 28.6 27.8 28.0   MCHC 32.6 34.2 34.4   RDW 14.6 13.2 13.4   Platelets 411 474 (A) 424   (A) Abnormal value            CMP    CMP 5/20/22 9/30/22 10/1/22   Glucose 79 190 (A) 153 (A)   BUN 14 9 9   Creatinine 0.77 0.93 0.67 (A)   Sodium 139 132 (A) 135 (A)   Potassium 4.3 3.5 3.8   Chloride 104 93 (A) 101   Calcium 10.0 8.9 8.5 (A)   Albumin 4.00 3.50 2.70 (A)   Total Bilirubin 0.2 0.6 0.4   Alkaline Phosphatase 170 (A) 180 (A) 149 (A)   AST (SGOT) 25 18 23   ALT (SGPT) 47 (A) 32 28   (A) Abnormal value              Assessment & Plan   Assessment / Plan     Assessment/Plan:  Sepsis present on admission secondary to pneumonia  Community-acquired pneumonia secondary to unknown bacterial source, presumed gram-negative  Cystic fibrosis  Fever  Therapeutic drug monitoring of vancomycin    Continue to monitor in the hospital for work-up management of the above  Pulmonology following, appreciate assistance  MRSA nares negative, however, sputum culture is pending  Will continue broad-spectrum vancomycin and cefepime until sputum culture returns  Monitor vancomycin levels  Continue with scheduled tobramycin    Continue with scheduled duo nebs, Pulmicort and Brovana twice daily, albuterol as needed  Heparin for DVT prophylaxis  Continue appropriate home medications including Creon  Trend renal function and electrolytes with a.m. BMP  Trend Hgb and WBC with a.m. CBC     Discussed plan with RN, pulmonology    DVT prophylaxis:  Medical DVT prophylaxis orders are present.    CODE STATUS:   Code Status (Patient has no pulse and is not breathing): CPR (Attempt to Resuscitate)  Medical Interventions (Patient has pulse or is breathing): Full Support        Electronically signed by Silvano Weir MD, 10/01/22, 2:41 PM EDT.

## 2022-10-01 NOTE — PLAN OF CARE
Goal Outcome Evaluation:  Plan of Care Reviewed With: patient        Progress: no change  Outcome Evaluation: pt resting in bed, iv fluids infusing, no c/o pain or discomfort, call light within reach. patient on room air.

## 2022-10-01 NOTE — CONSULTS
Pulmonary / Critical Care Consult Note      Patient Name: Marcus Jensen  : 1995  MRN: [id number]  Primary Care Physician:  Provider, No Known  Referring Physician: No ref. provider found  Date of admission: 2022    Subjective   Subjective     Reason for Consult/ Chief Complaint: History of cystic fibrosis, pneumonia    HPI:  Marcus Jensen is a 27 y.o. male who has diagnosis of cystic fibrosis since childhood, has nebulizer machine, chest vest and pancreatic enzymes at home but does not take them regularly.  He has not been following with pulmonary service over the last several years.  He was seen in pulmonary clinic yesterday, was found to have fever up to 102 degrees Fahrenheit, was tachycardic.  He was sent to the ER.  He was having persistent cough.  Was hypoxic with oxygen desaturation on room air, needing oxygen supplemental to keep saturations more than 90%.  He is admitted for cystic fibrosis with pneumonia, bronchiectasis with acute exacerbation.  Pulmonary service is consulted for assistance with management of his acute issues.  He is thin built.  He has chest vest at home but does not use it.  He has nebulizer machine but does not use it.  He used to be on Creon's but has not been taking it.  He has history of PEG tube in past when he was a child.  He also had traumatic brain injury in past.  He does not work.  He does not smoke.  He has cough with difficulty with airway clearance, productive of thick yellow phlegm at times.  Chest x-ray showed multifocal infiltrate.    Review of Systems  General:  Fatigue, Fever.  HEENT: No dysphagia, No Visual Changes, no rhinorrhea  Respiratory:  + Productive cough,+Dyspnea, mucoid yellow phlegm, No Pleuritic Pain, + wheezing, no hemoptysis  Cardiovascular: Denies chest pain, denies palpitations,+DIA, No Chest Pressure  Gastrointestinal:  No Abdominal Pain, Nausea, No Vomiting, intermittent constipation, no Diarrhea  Genitourinary:  No Dysuria, No  Frequency, No Hesitancy  Musculoskeletal: No muscle pain or swelling  Endocrine:  No Heat Intolerance, No Cold Intolerance  Hematologic:  No Bleeding, No Bruising  Psychiatric:  No Anxiety, No Depression  Neurologic:  No Confusion, no Dysarthria, No Headaches  Skin:  No Rash, No Open Wounds        Personal History     Past Medical History:   Diagnosis Date   • Cystic fibrosis (HCC)    • Head injury        History reviewed. No pertinent surgical history.    Family History: family history includes Asthma in his sister.     Social History:  reports that he has never smoked. His smokeless tobacco use includes chew. He reports previous alcohol use. Drug use questions deferred to the physician.    Home Medications:  albuterol, budesonide, formoterol, ondansetron ODT, pancrelipase (Lip-Prot-Amyl), and revefenacin    Allergies:  No Known Allergies    Objective    Objective     Vitals:   Temp:  [98.2 °F (36.8 °C)-102 °F (38.9 °C)] 100.6 °F (38.1 °C)  Heart Rate:  [] 115  Resp:  [12-22] 14  BP: ()/(66-79) 128/69    Physical Exam:  Vital Signs Reviewed   Thin built male, in mild distress, has coughing fits   HEENT:  PERRL, EOMI.  OP, nares clear, no sinus tenderness  Neck:  Supple, no JVD, no thyromegaly  Lymph: no axillary, cervical, supraclavicular lymphadenopathy noted bilaterally  Chest:   Bilateral coarse mechanical ventilator breath sounds, no wheezing, crackles or rhonchi, resonant percussion bilaterally  CV: RRR, no MGR, pulses 2+, equal.  Abd:  Soft, NT, ND, + BS, no HSM, previous PEG and surgical scar+  EXT:  no clubbing, no cyanosis, no edema, no joint tenderness  Neuro:  A&Ox3, CN grossly intact, no focal deficits.  Skin: No rashes or lesions noted      Result Review    Result Review:  I have personally reviewed the results from the time of this admission to 10/1/2022 07:15 EDT and agree with these findings:  [x]  Laboratory  [x]  Microbiology  [x]  Radiology  [x]  EKG/Telemetry   [x]   Cardiology/Vascular   []  Pathology  []  Old records  []  Other:  Most notable findings include:         Lab 10/01/22  0230 09/30/22  1704   WBC 16.16* 17.00*   HEMOGLOBIN 13.0 13.7   HEMATOCRIT 37.8 40.1   PLATELETS 424 474*   SODIUM 135* 132*   POTASSIUM 3.8 3.5   CHLORIDE 101 93*   CO2 25.3 26.0   BUN 9 9   CREATININE 0.67* 0.93   GLUCOSE 153* 190*   CALCIUM 8.5* 8.9   TOTAL PROTEIN 6.5 8.0   ALBUMIN 2.70* 3.50   GLOBULIN 3.8 4.5   Previous bronchoscopy culture from 2019 grew staph aureus.      Assessment & Plan   Assessment / Plan     Active Hospital Problems:  Active Hospital Problems    Diagnosis    • Sepsis, due to unspecified organism, unspecified whether acute organ dysfunction present (HCC)          Impression:  Cystic fibrosis with acute exacerbation  Multifocal pneumonia  Difficulty with airway clearance  History of chronic constipation  Pancreatic insufficiency  Malnutrition with BMI of 90  Recurrent sinusitis    Plan:  Check CT scan of the chest.  Continue with Brovana, Pulmicort and DuoNeb.  Discontinue Yupelri.  Continue with IV  Continue with Creon's 3 times a day.  Continue with IV vancomycin and cefepime.  Discontinue IV tobramycin.  Start NADYA nebulizer  Continue with  cc/h.  Check HbA1c.  He will likely need chest vest.  He has chest vest at home but has not been using it.  May need bronchoscopy.  Check a sputum culture, urine strep and Legionella antigen.  Check MRSA PCR    Reviewed labs, imaging and provider notes.  Discussed with bedside nurse and primary service.  Thank you for allowing me to participate in care of Marcus.  I will follow along    Electronically signed by Bright Worley MD, 10/1/2022, 07:15 EDT.

## 2022-10-01 NOTE — PLAN OF CARE
Goal Outcome Evaluation:  Plan of Care Reviewed With: patient, sibling        Progress: no change    PT is AAOx4, VSS, Sinus tach in low to mid 100s. Was febrile earlier in the shift @ 100.6 & 100.5, PRN Tylenol given and temp was 97.7 @ 1513. Remains on RA with sats in low to mid 90s. Has a NP cough. PRN Robitussin was ordered. A chest CT has been ordered. No c/o of pain or SOA. Remains on continuous IVF/LR @ 100ml/hr. Tolerating IV antibiotics well. UOP adequate. No new concerns voiced or acute events this shift. Continue to monitor and with current plan of care.

## 2022-10-01 NOTE — PROGRESS NOTES
"Pharmacy to Dose Vancomycin Day: 1    Marcus Jensen is a 27 y.o.male admitted with PNA. Pharmacy has been consulted to dose IV Vancomycin     Consulting Provider: WILMA  Clinical Indication: PNA  Pertinent Past Medical History: CF  Goal -600 mg/L.hr  Duration of therapy: 10/10    154.9 cm (61\")       10/01/22  0000      Weight: 48 kg (105 lb 13.1 oz)         Estimated Creatinine Clearance: 113.4 mL/min (A) (by C-G formula based on SCr of 0.67 mg/dL (L)).  Results from last 7 days  Lab  Units  10/01/22  0230  09/30/22  1704  BUN  mg/dL  9  9  CREATININE  mg/dL  0.67*  0.93    HD/PD/CRRT?:  NO    Lab Results   Component Value Date    WBC 16.16 (H) 10/01/2022      Temperature    09/30/22 1639 10/01/22 0000 10/01/22 0347   Temp: 100.5 °F (38.1 °C) 98.2 °F (36.8 °C) (!) 100.6 °F (38.1 °C)  Comment: nurse notified      Contrast Administered: NO    Relevant Micro:   Microbiology Results (last 10 days)       Procedure Component Value - Date/Time    Respiratory Culture - Sputum, Cough [[id number]] Collected: 10/01/22 0108    Lab Status: Preliminary result Specimen: Sputum from Cough Updated: 10/01/22 0223     Gram Stain Moderate (3+) WBCs seen      Rare (1+) Gram positive bacilli      Rare (1+) Gram positive cocci in pairs and clusters      Occasional Gram negative bacilli    Rapid Strep A Screen - Swab, Throat [[id number]]  (Normal) Collected: 09/30/22 1746    Lab Status: Final result Specimen: Swab from Throat Updated: 09/30/22 1806     Strep A Ag Negative    Influenza Antigen, Rapid - Swab, Nasopharynx [[id number]]  (Normal) Collected: 09/30/22 1746    Lab Status: Final result Specimen: Swab from Nasopharynx Updated: 09/30/22 1813     Influenza A Ag, EIA Negative     Influenza B Ag, EIA Negative    COVID-19,APTIMA PANTHER(KOURTNEY),[facility], NP/OP SWAB IN UTM/VTM/SALINE TRANSPORT MEDIA,24 HR TAT - Swab, Nasopharynx [[id number]]  (Normal) Collected: 09/30/22 8893    Lab Status: Final result Specimen: Swab from " Nasopharynx Updated: 10/01/22 0052     COVID19 Not Detected    Narrative:      Fact sheet for providers: https://www.fda.gov/media/668353/download     Fact sheet for patients: https://www.fda.gov/media/882204/download    Test performed by RT PCR.           Relevant Radiology:   CHEST X-RAY:  IMPRESSION:  1. There is mild diffuse opacities , somewhat nodular in the upper lobes, with more focal airspace opacity in the right lower lobe most concerning for multifocal pneumonia.  Recommend follow-up to resolution.    Other Antimicrobial Therapy: CEFEPIME, TOBRAMYCIN     Assessment/Plan  LOADING DOSE: 1000MG  Regimen: 1250 mg IV every 12 hours.  Start time: 08:10 on 10/01/2022  Exposure target: AUC24 (range)400-600 mg/L.hr   AUC24,ss: 579 mg/L.hr  PAUC*: 83 %  Ctrough,ss: 15.5 mg/L  Pconc*: 32 %  Tox.: 11 %    VANCOMYCIN RANDOM LEVEL ORDERED FOR TOMORROW AM.  MRSA PCR HAS BEEN ORDERED.

## 2022-10-01 NOTE — H&P
[facility] HISTORY AND PHYSICAL  Date: 2022   Patient Name: Marcus Jensen  : 1995  MRN: [id number]  Primary Care Physician:  Provider, No Known  Date of admission: 2022    Subjective   Subjective     Chief Complaint: Fever    HPI:    Marcus Jensen is a 27 y.o. male past medical history of cystic fibrosis and TBI that presents to the emergency department from pulmonology clinic for evaluation of fever and tachycardia.  Patient was being seen in the outpatient setting for regularly scheduled visit and vital signs were noted.  In the emergency department found to be febrile with tachycardia and a leukocytosis of 17,000.  Patient actually denies feeling febrile, chills, sweats, nausea, vomiting, chest pain, shortness of breath, palpitations, Lon pain diarrhea constipation, dysuria, weakness, rash.  However, he was found to have low oxygen saturation requiring nasal cannula to maintain sats greater than 90%.  Pulmonology called and recommend broad-spectrum antibiotics and to admit for ongoing monitoring and management      Personal History     Past Medical History:  Past Medical History:   Diagnosis Date   • Cystic fibrosis (HCC)    • Head injury          Past Surgical History:  History reviewed. No pertinent surgical history.      Family History:   Family History   Problem Relation Age of Onset   • Asthma Sister          Social History:   Social History     Tobacco Use   • Smoking status: Never Smoker   • Smokeless tobacco: Current User     Types: Chew   Vaping Use   • Vaping Use: Never used   Substance Use Topics   • Alcohol use: Not Currently   • Drug use: Defer         Home Medications:  albuterol, budesonide, formoterol, ketorolac, ondansetron ODT, pancrelipase (Lip-Prot-Amyl), polyethylene glycol, and revefenacin    Allergies:  No Known Allergies    Review of Systems   All systems were reviewed and negative except for: Fever    Objective   Objective     Vitals:   Temp:   [100.5 °F (38.1 °C)-102 °F (38.9 °C)] 100.5 °F (38.1 °C)  Heart Rate:  [] 99  Resp:  [22] 22  BP: (108-126)/(66-79) 126/79    Physical Exam    Constitutional: Awake, alert, no acute distress   Eyes: Pupils equal, sclerae anicteric, no conjunctival injection   HENT: NCAT, mucous membranes moist   Neck: Supple, no thyromegaly, no lymphadenopathy, trachea midline   Respiratory: Diminished bilaterally but nonlabored on nasal cannula   Cardiovascular: RRR, no murmurs, rubs, or gallops, palpable pedal pulses bilaterally   Gastrointestinal: Positive bowel sounds, soft, nontender, nondistended   Musculoskeletal: No bilateral ankle edema, no clubbing or cyanosis to extremities   Psychiatric: Appropriate affect, cooperative   Neurologic: Oriented x 3, strength symmetric in all extremities, Cranial Nerves grossly intact to confrontation, speech clear   Skin: No rashes     Result Review    Result Review:  I have personally reviewed the results from the time of this admission to 9/30/2022 20:15 EDT and agree with these findings:  [x]  Laboratory  [x]  Microbiology  [x]  Radiology  []  EKG/Telemetry   []  Cardiology/Vascular   []  Pathology  []  Old records  []  Other:      Assessment & Plan   Assessment / Plan     Assessment/Plan:   Sepsis secondary to pneumonia: In setting of CF, discussed with pulmonology.  We will start broad-spectrum antibiotics with vancomycin, tobramycin, cefepime.  Supportive care and supplemental oxygen as needed.  Continue IV hydration.  Serial labs.  Consult pulmonology and appreciate recommendations moving forward.  Check respiratory culture.  Follow blood cultures already obtained.  History of CF: Continue home respiratory treatments.  Supplemental oxygen as needed per      DVT prophylaxis:  Heparin subcu    CODE STATUS:    Code Status (Patient has no pulse and is not breathing): CPR (Attempt to Resuscitate)  Medical Interventions (Patient has pulse or is breathing): Full Support      Admission  Status:  I believe this patient meets inpatient status.    Electronically signed by Gold Garcia Jr, MD, 09/30/22, 8:15 PM EDT.

## 2022-10-02 LAB
ALBUMIN SERPL-MCNC: 2.6 G/DL (ref 3.5–5.2)
ANION GAP SERPL CALCULATED.3IONS-SCNC: 11 MMOL/L (ref 5–15)
BACTERIA SPEC AEROBE CULT: NORMAL
BASOPHILS # BLD AUTO: 0.07 10*3/MM3 (ref 0–0.2)
BASOPHILS NFR BLD AUTO: 0.4 % (ref 0–1.5)
BUN SERPL-MCNC: 8 MG/DL (ref 6–20)
BUN/CREAT SERPL: 12.7 (ref 7–25)
CALCIUM SPEC-SCNC: 8.4 MG/DL (ref 8.6–10.5)
CHLORIDE SERPL-SCNC: 99 MMOL/L (ref 98–107)
CO2 SERPL-SCNC: 25 MMOL/L (ref 22–29)
CREAT SERPL-MCNC: 0.63 MG/DL (ref 0.76–1.27)
DEPRECATED RDW RBC AUTO: 40.7 FL (ref 37–54)
EGFRCR SERPLBLD CKD-EPI 2021: 134.5 ML/MIN/1.73
EOSINOPHIL # BLD AUTO: 0.21 10*3/MM3 (ref 0–0.4)
EOSINOPHIL NFR BLD AUTO: 1.1 % (ref 0.3–6.2)
ERYTHROCYTE [DISTWIDTH] IN BLOOD BY AUTOMATED COUNT: 13.6 % (ref 12.3–15.4)
GLUCOSE SERPL-MCNC: 116 MG/DL (ref 65–99)
HBA1C MFR BLD: 6.3 % (ref 4.8–5.6)
HCT VFR BLD AUTO: 36.7 % (ref 37.5–51)
HGB BLD-MCNC: 12.2 G/DL (ref 13–17.7)
IMM GRANULOCYTES # BLD AUTO: 0.13 10*3/MM3 (ref 0–0.05)
IMM GRANULOCYTES NFR BLD AUTO: 0.7 % (ref 0–0.5)
LYMPHOCYTES # BLD AUTO: 2.64 10*3/MM3 (ref 0.7–3.1)
LYMPHOCYTES NFR BLD AUTO: 13.9 % (ref 19.6–45.3)
MAGNESIUM SERPL-MCNC: 1.7 MG/DL (ref 1.6–2.6)
MCH RBC QN AUTO: 27.3 PG (ref 26.6–33)
MCHC RBC AUTO-ENTMCNC: 33.2 G/DL (ref 31.5–35.7)
MCV RBC AUTO: 82.1 FL (ref 79–97)
MONOCYTES # BLD AUTO: 1.38 10*3/MM3 (ref 0.1–0.9)
MONOCYTES NFR BLD AUTO: 7.3 % (ref 5–12)
NEUTROPHILS NFR BLD AUTO: 14.51 10*3/MM3 (ref 1.7–7)
NEUTROPHILS NFR BLD AUTO: 76.6 % (ref 42.7–76)
NRBC BLD AUTO-RTO: 0 /100 WBC (ref 0–0.2)
PHOSPHATE SERPL-MCNC: 3.5 MG/DL (ref 2.5–4.5)
PLATELET # BLD AUTO: 474 10*3/MM3 (ref 140–450)
PMV BLD AUTO: 9.6 FL (ref 6–12)
POTASSIUM SERPL-SCNC: 4 MMOL/L (ref 3.5–5.2)
RBC # BLD AUTO: 4.47 10*6/MM3 (ref 4.14–5.8)
SODIUM SERPL-SCNC: 135 MMOL/L (ref 136–145)
VANCOMYCIN SERPL-MCNC: 9.16 MCG/ML (ref 5–40)
WBC NRBC COR # BLD: 18.94 10*3/MM3 (ref 3.4–10.8)

## 2022-10-02 PROCEDURE — 83735 ASSAY OF MAGNESIUM: CPT | Performed by: INTERNAL MEDICINE

## 2022-10-02 PROCEDURE — 85025 COMPLETE CBC W/AUTO DIFF WBC: CPT | Performed by: INTERNAL MEDICINE

## 2022-10-02 PROCEDURE — 80202 ASSAY OF VANCOMYCIN: CPT | Performed by: INTERNAL MEDICINE

## 2022-10-02 PROCEDURE — 94760 N-INVAS EAR/PLS OXIMETRY 1: CPT

## 2022-10-02 PROCEDURE — 94799 UNLISTED PULMONARY SVC/PX: CPT

## 2022-10-02 PROCEDURE — 99233 SBSQ HOSP IP/OBS HIGH 50: CPT | Performed by: INTERNAL MEDICINE

## 2022-10-02 PROCEDURE — 25010000002 HEPARIN (PORCINE) PER 1000 UNITS: Performed by: INTERNAL MEDICINE

## 2022-10-02 PROCEDURE — 25010000002 VANCOMYCIN 5 G RECONSTITUTED SOLUTION: Performed by: INTERNAL MEDICINE

## 2022-10-02 PROCEDURE — 94669 MECHANICAL CHEST WALL OSCILL: CPT

## 2022-10-02 PROCEDURE — 25010000002 CEFEPIME PER 500 MG: Performed by: INTERNAL MEDICINE

## 2022-10-02 PROCEDURE — 80069 RENAL FUNCTION PANEL: CPT | Performed by: INTERNAL MEDICINE

## 2022-10-02 RX ADMIN — CEFEPIME HYDROCHLORIDE 2 G: 2 INJECTION, POWDER, FOR SOLUTION INTRAMUSCULAR; INTRAVENOUS at 03:10

## 2022-10-02 RX ADMIN — ARFORMOTEROL TARTRATE 15 MCG: 15 SOLUTION RESPIRATORY (INHALATION) at 19:40

## 2022-10-02 RX ADMIN — Medication 10 ML: at 21:13

## 2022-10-02 RX ADMIN — HEPARIN SODIUM 5000 UNITS: 5000 INJECTION INTRAVENOUS; SUBCUTANEOUS at 21:12

## 2022-10-02 RX ADMIN — PANCRELIPASE 6000 UNITS OF LIPASE: 30000; 6000; 19000 CAPSULE, DELAYED RELEASE PELLETS ORAL at 17:33

## 2022-10-02 RX ADMIN — HEPARIN SODIUM 5000 UNITS: 5000 INJECTION INTRAVENOUS; SUBCUTANEOUS at 14:54

## 2022-10-02 RX ADMIN — HEPARIN SODIUM 5000 UNITS: 5000 INJECTION INTRAVENOUS; SUBCUTANEOUS at 05:48

## 2022-10-02 RX ADMIN — CEFEPIME HYDROCHLORIDE 2 G: 2 INJECTION, POWDER, FOR SOLUTION INTRAMUSCULAR; INTRAVENOUS at 21:13

## 2022-10-02 RX ADMIN — SODIUM CHLORIDE, POTASSIUM CHLORIDE, SODIUM LACTATE AND CALCIUM CHLORIDE 100 ML/HR: 600; 310; 30; 20 INJECTION, SOLUTION INTRAVENOUS at 07:15

## 2022-10-02 RX ADMIN — IPRATROPIUM BROMIDE AND ALBUTEROL SULFATE 3 ML: 2.5; .5 SOLUTION RESPIRATORY (INHALATION) at 00:31

## 2022-10-02 RX ADMIN — GUAIFENESIN 200 MG: 200 SOLUTION ORAL at 21:12

## 2022-10-02 RX ADMIN — BUDESONIDE 0.5 MG: 0.5 SUSPENSION RESPIRATORY (INHALATION) at 19:40

## 2022-10-02 RX ADMIN — IPRATROPIUM BROMIDE AND ALBUTEROL SULFATE 3 ML: 2.5; .5 SOLUTION RESPIRATORY (INHALATION) at 14:03

## 2022-10-02 RX ADMIN — PANCRELIPASE 6000 UNITS OF LIPASE: 30000; 6000; 19000 CAPSULE, DELAYED RELEASE PELLETS ORAL at 08:49

## 2022-10-02 RX ADMIN — CEFEPIME HYDROCHLORIDE 2 G: 2 INJECTION, POWDER, FOR SOLUTION INTRAMUSCULAR; INTRAVENOUS at 12:34

## 2022-10-02 RX ADMIN — BUDESONIDE 0.5 MG: 0.5 SUSPENSION RESPIRATORY (INHALATION) at 06:32

## 2022-10-02 RX ADMIN — IPRATROPIUM BROMIDE AND ALBUTEROL SULFATE 3 ML: 2.5; .5 SOLUTION RESPIRATORY (INHALATION) at 06:32

## 2022-10-02 RX ADMIN — PANCRELIPASE 6000 UNITS OF LIPASE: 30000; 6000; 19000 CAPSULE, DELAYED RELEASE PELLETS ORAL at 12:34

## 2022-10-02 RX ADMIN — VANCOMYCIN HYDROCHLORIDE 1250 MG: 5 INJECTION, POWDER, LYOPHILIZED, FOR SOLUTION INTRAVENOUS at 08:49

## 2022-10-02 RX ADMIN — Medication 10 ML: at 08:49

## 2022-10-02 RX ADMIN — IPRATROPIUM BROMIDE AND ALBUTEROL SULFATE 3 ML: 2.5; .5 SOLUTION RESPIRATORY (INHALATION) at 19:40

## 2022-10-02 RX ADMIN — ARFORMOTEROL TARTRATE 15 MCG: 15 SOLUTION RESPIRATORY (INHALATION) at 06:32

## 2022-10-02 RX ADMIN — VANCOMYCIN HYDROCHLORIDE 1250 MG: 5 INJECTION, POWDER, LYOPHILIZED, FOR SOLUTION INTRAVENOUS at 21:13

## 2022-10-02 NOTE — PLAN OF CARE
Goal Outcome Evaluation:  Plan of Care Reviewed With: patient        Progress: no change  Outcome Evaluation: pt resting in bed, pt on room air, no c/o pain or discomfort, call light within reach.

## 2022-10-02 NOTE — PROGRESS NOTES
[facility]   Hospitalist Progress Note  Date: 10/2/2022  Patient Name: Marcus Jensen  : 1995  MRN: [id number]  Date of admission: 2022      Subjective   Subjective     Chief Complaint: Fever    Summary: 27 y.o. male past medical history of cystic fibrosis and TBI that presents to the emergency department from pulmonology clinic for evaluation of fever and tachycardia.  Patient was being seen in the outpatient setting for regularly scheduled visit and vital signs were noted.  In the emergency department found to be febrile with tachycardia and a leukocytosis of 17,000.  Patient actually denies feeling febrile, chills, sweats, nausea, vomiting, chest pain, shortness of breath, palpitations, Lon pain diarrhea constipation, dysuria, weakness, rash.  However, he was found to have low oxygen saturation requiring nasal cannula to maintain sats greater than 90%.  Pulmonology called and recommend broad-spectrum antibiotics and to admit for ongoing monitoring and management    Interval Followup: No acute events overnight.  Afebrile overnight.  Feels considerably better.  Denies chest pain.  Still has intermittent cough.  Remains on room air.  Otherwise no new complaints.    Review of Systems   All systems reviewed and negative unless otherwise stated under interval follow-up    Objective   Objective     Vitals:   Temp:  [98 °F (36.7 °C)-100.4 °F (38 °C)] 99.3 °F (37.4 °C)  Heart Rate:  [103-119] 113  Resp:  [18-24] 20  BP: (113-132)/(61-91) 132/87  Physical Exam    Constitutional: Awake, alert, no acute distress    Eyes: Pupils equal, sclerae anicteric, no conjunctival injection   HENT: NCAT, mucous membranes moist   Neck: Supple, no thyromegaly, no lymphadenopathy, trachea midline   Respiratory: Clear to auscultation bilaterally, nonlabored respirations    Cardiovascular: RRR, no murmurs, rubs, or gallops   Gastrointestinal: Positive bowel sounds, soft, nontender, nondistended   Musculoskeletal: No  bilateral ankle edema, no clubbing or cyanosis to extremities   Psychiatric: Appropriate affect, cooperative   Neurologic: Oriented x 3, strength symmetric in all extremities, Cranial Nerves grossly intact to confrontation, speech clear   Skin: No rashes     Result Review    Result Review:  I have personally reviewed the results from the time of this admission to 10/2/2022 15:44 EDT and agree with these findings:  [x]  Laboratory all labs reviewed  []  Microbiology  []  Radiology  [x]  EKG/Telemetry telemetry reviewed showing normal sinus rhythm with intermittent sinus tachycardia  []  Cardiology/Vascular   []  Pathology  []  Old records  []  Other:  CBC    CBC 9/30/22 10/1/22 10/2/22   WBC 17.00 (A) 16.16 (A) 18.94 (A)   RBC 4.92 4.64 4.47   Hemoglobin 13.7 13.0 12.2 (A)   Hematocrit 40.1 37.8 36.7 (A)   MCV 81.5 81.5 82.1   MCH 27.8 28.0 27.3   MCHC 34.2 34.4 33.2   RDW 13.2 13.4 13.6   Platelets 474 (A) 424 474 (A)   (A) Abnormal value            CMP    CMP 9/30/22 10/1/22 10/2/22   Glucose 190 (A) 153 (A) 116 (A)   BUN 9 9 8   Creatinine 0.93 0.67 (A) 0.63 (A)   Sodium 132 (A) 135 (A) 135 (A)   Potassium 3.5 3.8 4.0   Chloride 93 (A) 101 99   Calcium 8.9 8.5 (A) 8.4 (A)   Albumin 3.50 2.70 (A) 2.60 (A)   Total Bilirubin 0.6 0.4    Alkaline Phosphatase 180 (A) 149 (A)    AST (SGOT) 18 23    ALT (SGPT) 32 28    (A) Abnormal value              Assessment & Plan   Assessment / Plan     Assessment/Plan:  Sepsis present on admission secondary to pneumonia  Community-acquired pneumonia secondary to Staph aureus and streptococcal pneumonia  Cystic fibrosis  Fever  Pancreatic insufficiency on Creon  Malnutrition with BMI of 19  Recurrent sinusitis  Therapeutic drug monitoring of vancomycin    Continue to monitor in the hospital for work-up management of the above  Pulmonology following, appreciate assistance  Sputum culture growing heavy growth Staph aureus, strep pneumo antigen positive  Will continue broad-spectrum  vancomycin and cefepime for now, de-escalate when all cultures returned  Monitor vancomycin levels  Continue tobramycin nebulizers  Chest physiotherapy ordered by pulmonology  Continue with scheduled duo nebs, Pulmicort and Brovana twice daily, albuterol as needed  Patient still with persistent leukocytosis, may benefit from bronchoscopy but would defer to pulmonology  Check 1 3 beta D glucan  Heparin for DVT prophylaxis  Continue appropriate home medications including Creon  Trend renal function and electrolytes with a.m. BMP  Trend Hgb and WBC with a.m. CBC     Discussed plan with RN, pulmonology    DVT prophylaxis:  Medical DVT prophylaxis orders are present.    CODE STATUS:   Code Status (Patient has no pulse and is not breathing): CPR (Attempt to Resuscitate)  Medical Interventions (Patient has pulse or is breathing): Full Support

## 2022-10-02 NOTE — PROGRESS NOTES
Pulmonary / Critical Care Progress Note      Patient Name: Marcus Jensen  : 1995  MRN: [id number]  Primary Care Physician:  Provider, No Known  Date of admission: 2022    Subjective   Subjective   Follow-up for cystic fibrosis with bronchiectasis, acute exacerbation, pneumonia, strep pneumo pneumonia, and staff aureus pneumonia    Over past 24 hours: Remained on broad-spectrum antibiotics.  CT scan of the chest was done.  Airway clearance therapies started.  Remained on aggressive nebulizer treatments.    No acute events overnight.     This morning,   Feels about the same  Has cough, not able to clear secretions  Having chest vest 4 times a day  Still on room air.  Has not moved around much.      Review of Systems  General:  Fatigue, Fever.  HEENT: No dysphagia, No Visual Changes, no rhinorrhea  Respiratory:  + Productive cough,+Dyspnea, mucoid yellow phlegm, No Pleuritic Pain, + wheezing, no hemoptysis  Cardiovascular: Denies chest pain, denies palpitations,+DIA, No Chest Pressure  Gastrointestinal:  No Abdominal Pain, Nausea, No Vomiting, intermittent constipation, no Diarrhea  Genitourinary:  No Dysuria, No Frequency, No Hesitancy  Musculoskeletal: No muscle pain or swelling  Endocrine:  No Heat Intolerance, No Cold Intolerance  Hematologic:  No Bleeding, No Bruising  Psychiatric:  No Anxiety, No Depression  Neurologic:  No Confusion, no Dysarthria, No Headaches  Skin:  No Rash, No Open Wounds        Objective   Objective     Vitals:   Temp:  [97.7 °F (36.5 °C)-100.5 °F (38.1 °C)] 98 °F (36.7 °C)  Heart Rate:  [102-119] 109  Resp:  [18-24] 20  BP: (115-132)/(69-91) 123/73  Physical Exam   Vital Signs Reviewed   Thin built male, in mild distress, has coughing fits   HEENT:  PERRL, EOMI.  OP, nares clear, no sinus tenderness  Neck:  Supple, no JVD, no thyromegaly  Lymph: no axillary, cervical, supraclavicular lymphadenopathy noted bilaterally  Chest:   Bilateral coarse mechanical ventilator breath  sounds, no wheezing, crackles or rhonchi, resonant percussion bilaterally  CV: RRR, no MGR, pulses 2+, equal.  Abd:  Soft, NT, ND, + BS, no HSM, previous PEG and surgical scar+  EXT:  no clubbing, no cyanosis, no edema, no joint tenderness  Neuro:  A&Ox3, CN grossly intact, no focal deficits.  Skin: No rashes or lesions noted      Result Review    Result Review:  I have personally reviewed the results from the time of this admission to 10/2/2022 10:16 EDT and agree with these findings:  [x]  Laboratory  [x]  Microbiology  [x]  Radiology  [x]  EKG/Telemetry   [x]  Cardiology/Vascular   []  Pathology  []  Old records  []  Other:  Most notable findings include:         Lab 10/02/22  0609 10/01/22  0230 09/30/22  1704   WBC 18.94* 16.16* 17.00*   HEMOGLOBIN 12.2* 13.0 13.7   HEMATOCRIT 36.7* 37.8 40.1   PLATELETS 474* 424 474*   SODIUM 135* 135* 132*   POTASSIUM 4.0 3.8 3.5   CHLORIDE 99 101 93*   CO2 25.0 25.3 26.0   BUN 8 9 9   CREATININE 0.63* 0.67* 0.93   GLUCOSE 116* 153* 190*   CALCIUM 8.4* 8.5* 8.9   PHOSPHORUS 3.5  --   --    TOTAL PROTEIN  --  6.5 8.0   ALBUMIN 2.60* 2.70* 3.50   GLOBULIN  --  3.8 4.5         Assessment & Plan   Assessment / Plan     Active Hospital Problems:  Active Hospital Problems    Diagnosis    • Sepsis, due to unspecified organism, unspecified whether acute organ dysfunction present (HCC)          Impression:   Cystic fibrosis with acute exacerbation  Multifocal pneumonia  Difficulty with airway clearance  History of chronic constipation  Pancreatic insufficiency  Malnutrition with BMI of 90  Recurrent sinusitis  Strep pneumo pneumonia  Staph aureus pneumonia    Plan:   CT scan of the chest with bilateral bronchiectasis and patchy infiltrates, GGO.   Continue with Brovana, Pulmicort and DuoNeb.   Continue with Creon's 3 times a day.  Continue with IV vancomycin and cefepime.  Discontinue IV tobramycin.  NADYA nebulizer bid.   Continue with  cc/h.  Check HbA1c.  Chest vest qid.  May  need bronchoscopy.   Check 1 3 B D glucan.   Check a sputum culture, positive urine strep antigen. Staph aureus in sputum. Check MRSA PCR     DVT prophylaxis:  Medical DVT prophylaxis orders are present.    CODE STATUS:   Code Status (Patient has no pulse and is not breathing): CPR (Attempt to Resuscitate)  Medical Interventions (Patient has pulse or is breathing): Full Support      I personally reviewed pertinent labs, imaging and provider notes. Discussed with bedside nurse and will discuss with primary service.       Electronically signed by Bright Worley MD, 10/2/2022, 10:16 EDT.

## 2022-10-02 NOTE — PROGRESS NOTES
"Pharmacy to Dose Vancomycin Day: 2    Marcus Jensen is a 27 y.o.male admitted with PNA. Pharmacy has been consulted to dose IV Vancomycin     Consulting Provider: WILMA  Clinical Indication: PNA  Pertinent Past Medical History: CF  Goal -600 mg/L.hr  Duration of therapy: 10/10    154.9 cm (61\")       10/01/22  0000      Weight: 48 kg (105 lb 13.1 oz)         Estimated Creatinine Clearance: 120.6 mL/min (A) (by C-G formula based on SCr of 0.63 mg/dL (L)).  Results from last 7 days  Lab  Units  10/02/22  0609  10/01/22  0230  09/30/22  1704  BUN  mg/dL  8  9  9  CREATININE  mg/dL  0.63*  0.67*  0.93    HD/PD/CRRT?:  NO    Lab Results   Component Value Date    WBC 18.94 (H) 10/02/2022      Temperature    10/01/22 1911 10/01/22 2244 10/02/22 0328   Temp: 100 °F (37.8 °C) 100.4 °F (38 °C) 99.3 °F (37.4 °C)      Contrast Administered: NO    Relevant Micro:   Microbiology Results (last 10 days)       Procedure Component Value - Date/Time    S. Pneumo Ag Urine or CSF - Urine, Urine, Clean Catch [[id number]]  (Abnormal) Collected: 10/01/22 1703    Lab Status: Final result Specimen: Urine, Clean Catch Updated: 10/01/22 1802     Strep Pneumo Ag Positive    Legionella Antigen, Urine - Urine, Urine, Clean Catch [[id number]]  (Normal) Collected: 10/01/22 1703    Lab Status: Final result Specimen: Urine, Clean Catch Updated: 10/01/22 1802     LEGIONELLA ANTIGEN, URINE Negative    MRSA Screen, PCR (Inpatient) - Swab, Nares [[id number]]  (Normal) Collected: 10/01/22 1041    Lab Status: Final result Specimen: Swab from Nares Updated: 10/01/22 1338     MRSA PCR No MRSA Detected    Narrative:      The negative predictive value of this diagnostic test is high and should only be used to consider de-escalating anti-MRSA therapy. A positive result may indicate colonization with MRSA and must be correlated clinically.    Respiratory Culture - Sputum, Cough [[id number]] Collected: 10/01/22 0108    Lab Status: Preliminary result " Specimen: Sputum from Cough Updated: 10/01/22 0223     Gram Stain Moderate (3+) WBCs seen      Rare (1+) Gram positive bacilli      Rare (1+) Gram positive cocci in pairs and clusters      Occasional Gram negative bacilli    Rapid Strep A Screen - Swab, Throat [[id number]]  (Normal) Collected: 09/30/22 1746    Lab Status: Final result Specimen: Swab from Throat Updated: 09/30/22 1806     Strep A Ag Negative    Influenza Antigen, Rapid - Swab, Nasopharynx [[id number]]  (Normal) Collected: 09/30/22 1746    Lab Status: Final result Specimen: Swab from Nasopharynx Updated: 09/30/22 1813     Influenza A Ag, EIA Negative     Influenza B Ag, EIA Negative    COVID-19,APTIMA PANTHER(KOURTNEY),[facility], NP/OP SWAB IN UTM/VTM/SALINE TRANSPORT MEDIA,24 HR TAT - Swab, Nasopharynx [[id number]]  (Normal) Collected: 09/30/22 1746    Lab Status: Final result Specimen: Swab from Nasopharynx Updated: 10/01/22 0052     COVID19 Not Detected    Narrative:      Fact sheet for providers: https://www.fda.gov/media/905618/download     Fact sheet for patients: https://www.fda.gov/media/652485/download    Test performed by RT PCR.    Beta Strep Culture, Throat - Swab, Throat [[id number]]  (Normal) Collected: 09/30/22 1746    Lab Status: Preliminary result Specimen: Swab from Throat Updated: 10/01/22 0916     Throat Culture, Beta Strep No Beta Hemolytic Streptococcus Isolated    Narrative:      Group A Strep incidence is low in adults. Positive culture for Beta hemolytic Streptococcus species can reflect colonization and not true infection. Please correlate clinically.    Blood Culture - Blood, Arm, Left [[id number]]  (Normal) Collected: 09/30/22 1704    Lab Status: Preliminary result Specimen: Blood from Arm, Left Updated: 10/01/22 1718     Blood Culture No growth at 24 hours    Blood Culture - Blood, Arm, Right [[id number]]  (Normal) Collected: 09/30/22 1704    Lab Status: Preliminary result Specimen: Blood from Arm, Right Updated: 10/01/22 1718      Blood Culture No growth at 24 hours           Relevant Radiology:   CHEST X-RAY:  IMPRESSION:  1. There is mild diffuse opacities , somewhat nodular in the upper lobes, with more focal airspace opacity in the right lower lobe most concerning for multifocal pneumonia.  Recommend follow-up to resolution.    Other Antimicrobial Therapy: CEFEPIME, TOBRAMYCIN NEBS    Assessment/Plan  Regimen: 1250 mg IV every 12 hours.  Start time: 08:09 on 10/02/2022  Exposure target: AUC24 (range)400-600 mg/L.hr   AUC24,ss: 429 mg/L.hr  PAUC*: 64 %  Ctrough,ss: 9.2 mg/L  Pconc*: 1 %  Tox.: 5 %    VANCOMYCIN LEVEL TODAY REPORTED AS 9.16- OK TO CONTINUE ON 1250MG (~26MG/KG) [id number].    CONSIDER FOLLOW UP LEVEL IN THE NEXT FEW DAYS CONSIDERING WT= 48KGS, DOSING AT ~26MG/KG AND CF.    BMP TOMORROW AM.

## 2022-10-02 NOTE — PLAN OF CARE
Goal Outcome Evaluation:              Outcome Evaluation: AOx4. Tolerating room air. Cardiac monitor discontinued, had been in sinus tach throughout shift. No c/o pain. Up ad griselda in room.

## 2022-10-03 ENCOUNTER — READMISSION MANAGEMENT (OUTPATIENT)
Dept: CALL CENTER | Facility: HOSPITAL | Age: 27
End: 2022-10-03

## 2022-10-03 VITALS
BODY MASS INDEX: 19.98 KG/M2 | TEMPERATURE: 98.5 F | OXYGEN SATURATION: 92 % | WEIGHT: 105.82 LBS | HEART RATE: 105 BPM | DIASTOLIC BLOOD PRESSURE: 83 MMHG | SYSTOLIC BLOOD PRESSURE: 119 MMHG | HEIGHT: 61 IN | RESPIRATION RATE: 18 BRPM

## 2022-10-03 LAB
ANION GAP SERPL CALCULATED.3IONS-SCNC: 8.9 MMOL/L (ref 5–15)
BACTERIA SPEC RESP CULT: ABNORMAL
BACTERIA SPEC RESP CULT: ABNORMAL
BASOPHILS # BLD AUTO: 0.06 10*3/MM3 (ref 0–0.2)
BASOPHILS NFR BLD AUTO: 0.4 % (ref 0–1.5)
BUN SERPL-MCNC: 10 MG/DL (ref 6–20)
BUN/CREAT SERPL: 14.9 (ref 7–25)
CALCIUM SPEC-SCNC: 8.4 MG/DL (ref 8.6–10.5)
CHLORIDE SERPL-SCNC: 99 MMOL/L (ref 98–107)
CO2 SERPL-SCNC: 24.1 MMOL/L (ref 22–29)
CREAT SERPL-MCNC: 0.67 MG/DL (ref 0.76–1.27)
DEPRECATED RDW RBC AUTO: 41.2 FL (ref 37–54)
EGFRCR SERPLBLD CKD-EPI 2021: 132.1 ML/MIN/1.73
EOSINOPHIL # BLD AUTO: 0.44 10*3/MM3 (ref 0–0.4)
EOSINOPHIL NFR BLD AUTO: 2.8 % (ref 0.3–6.2)
ERYTHROCYTE [DISTWIDTH] IN BLOOD BY AUTOMATED COUNT: 13.8 % (ref 12.3–15.4)
GLUCOSE SERPL-MCNC: 103 MG/DL (ref 65–99)
GRAM STN SPEC: ABNORMAL
HCT VFR BLD AUTO: 35.1 % (ref 37.5–51)
HGB BLD-MCNC: 11.8 G/DL (ref 13–17.7)
IMM GRANULOCYTES # BLD AUTO: 0.1 10*3/MM3 (ref 0–0.05)
IMM GRANULOCYTES NFR BLD AUTO: 0.6 % (ref 0–0.5)
LYMPHOCYTES # BLD AUTO: 2.37 10*3/MM3 (ref 0.7–3.1)
LYMPHOCYTES NFR BLD AUTO: 14.9 % (ref 19.6–45.3)
MAGNESIUM SERPL-MCNC: 1.9 MG/DL (ref 1.6–2.6)
MCH RBC QN AUTO: 27.8 PG (ref 26.6–33)
MCHC RBC AUTO-ENTMCNC: 33.6 G/DL (ref 31.5–35.7)
MCV RBC AUTO: 82.6 FL (ref 79–97)
MONOCYTES # BLD AUTO: 0.97 10*3/MM3 (ref 0.1–0.9)
MONOCYTES NFR BLD AUTO: 6.1 % (ref 5–12)
NEUTROPHILS NFR BLD AUTO: 12.01 10*3/MM3 (ref 1.7–7)
NEUTROPHILS NFR BLD AUTO: 75.2 % (ref 42.7–76)
NRBC BLD AUTO-RTO: 0 /100 WBC (ref 0–0.2)
PHOSPHATE SERPL-MCNC: 3.7 MG/DL (ref 2.5–4.5)
PLATELET # BLD AUTO: 509 10*3/MM3 (ref 140–450)
PMV BLD AUTO: 9.3 FL (ref 6–12)
POTASSIUM SERPL-SCNC: 4.2 MMOL/L (ref 3.5–5.2)
RBC # BLD AUTO: 4.25 10*6/MM3 (ref 4.14–5.8)
SODIUM SERPL-SCNC: 132 MMOL/L (ref 136–145)
WBC NRBC COR # BLD: 15.95 10*3/MM3 (ref 3.4–10.8)

## 2022-10-03 PROCEDURE — 83735 ASSAY OF MAGNESIUM: CPT | Performed by: INTERNAL MEDICINE

## 2022-10-03 PROCEDURE — 85025 COMPLETE CBC W/AUTO DIFF WBC: CPT | Performed by: INTERNAL MEDICINE

## 2022-10-03 PROCEDURE — 99233 SBSQ HOSP IP/OBS HIGH 50: CPT | Performed by: INTERNAL MEDICINE

## 2022-10-03 PROCEDURE — 99239 HOSP IP/OBS DSCHRG MGMT >30: CPT | Performed by: INTERNAL MEDICINE

## 2022-10-03 PROCEDURE — 25010000002 HEPARIN (PORCINE) PER 1000 UNITS: Performed by: INTERNAL MEDICINE

## 2022-10-03 PROCEDURE — 87449 NOS EACH ORGANISM AG IA: CPT | Performed by: INTERNAL MEDICINE

## 2022-10-03 PROCEDURE — 25010000002 VANCOMYCIN 5 G RECONSTITUTED SOLUTION: Performed by: INTERNAL MEDICINE

## 2022-10-03 PROCEDURE — 25010000002 CEFEPIME PER 500 MG: Performed by: INTERNAL MEDICINE

## 2022-10-03 PROCEDURE — 94799 UNLISTED PULMONARY SVC/PX: CPT

## 2022-10-03 PROCEDURE — 80048 BASIC METABOLIC PNL TOTAL CA: CPT | Performed by: INTERNAL MEDICINE

## 2022-10-03 PROCEDURE — 84100 ASSAY OF PHOSPHORUS: CPT | Performed by: INTERNAL MEDICINE

## 2022-10-03 PROCEDURE — 94669 MECHANICAL CHEST WALL OSCILL: CPT

## 2022-10-03 RX ORDER — LINEZOLID 600 MG/1
600 TABLET, FILM COATED ORAL 2 TIMES DAILY
Qty: 20 TABLET | Refills: 0 | Status: SHIPPED | OUTPATIENT
Start: 2022-10-03 | End: 2022-10-04

## 2022-10-03 RX ADMIN — CEFEPIME HYDROCHLORIDE 2 G: 2 INJECTION, POWDER, FOR SOLUTION INTRAMUSCULAR; INTRAVENOUS at 11:43

## 2022-10-03 RX ADMIN — HEPARIN SODIUM 5000 UNITS: 5000 INJECTION INTRAVENOUS; SUBCUTANEOUS at 05:46

## 2022-10-03 RX ADMIN — BUDESONIDE 0.5 MG: 0.5 SUSPENSION RESPIRATORY (INHALATION) at 07:46

## 2022-10-03 RX ADMIN — IPRATROPIUM BROMIDE AND ALBUTEROL SULFATE 3 ML: 2.5; .5 SOLUTION RESPIRATORY (INHALATION) at 07:46

## 2022-10-03 RX ADMIN — CEFEPIME HYDROCHLORIDE 2 G: 2 INJECTION, POWDER, FOR SOLUTION INTRAMUSCULAR; INTRAVENOUS at 04:14

## 2022-10-03 RX ADMIN — PANCRELIPASE 6000 UNITS OF LIPASE: 30000; 6000; 19000 CAPSULE, DELAYED RELEASE PELLETS ORAL at 11:43

## 2022-10-03 RX ADMIN — VANCOMYCIN HYDROCHLORIDE 1250 MG: 5 INJECTION, POWDER, LYOPHILIZED, FOR SOLUTION INTRAVENOUS at 09:21

## 2022-10-03 RX ADMIN — IPRATROPIUM BROMIDE AND ALBUTEROL SULFATE 3 ML: 2.5; .5 SOLUTION RESPIRATORY (INHALATION) at 01:02

## 2022-10-03 RX ADMIN — PANCRELIPASE 6000 UNITS OF LIPASE: 30000; 6000; 19000 CAPSULE, DELAYED RELEASE PELLETS ORAL at 09:21

## 2022-10-03 RX ADMIN — HEPARIN SODIUM 5000 UNITS: 5000 INJECTION INTRAVENOUS; SUBCUTANEOUS at 13:45

## 2022-10-03 RX ADMIN — Medication 10 ML: at 09:21

## 2022-10-03 RX ADMIN — ARFORMOTEROL TARTRATE 15 MCG: 15 SOLUTION RESPIRATORY (INHALATION) at 07:46

## 2022-10-03 RX ADMIN — IPRATROPIUM BROMIDE AND ALBUTEROL SULFATE 3 ML: 2.5; .5 SOLUTION RESPIRATORY (INHALATION) at 15:01

## 2022-10-03 NOTE — DISCHARGE INSTR - LAB
Pulmonology follow up on 10/28/22 @ 9:00am      [facility] pulmonary and critical care medicine  [email or web address], [place], [place]          Patient to find own PCP and follow up in 3-5 days

## 2022-10-03 NOTE — DISCHARGE SUMMARY
[facility]         HOSPITALIST  DISCHARGE SUMMARY    Patient Name: Marcus Jensen  : 1995  MRN: [id number]    Date of Admission: 2022  Date of Discharge: 10/3/2022  Primary Care Physician: Provider, No Known    Consults     Date and Time Order Name Status Description    10/1/2022  9:08 AM Inpatient Pulmonology Consult      10/1/2022 12:00 AM Inpatient Pulmonology Consult Completed     2022  7:22 PM Hospitalist (on-call MD unless specified)            Active and Resolved Hospital Problems:  Active Hospital Problems    Diagnosis POA   • Sepsis, due to unspecified organism, unspecified whether acute organ dysfunction present (HCC) [A41.9] Yes      Resolved Hospital Problems   No resolved problems to display.   Sepsis present on admission secondary to MRSA pneumonia  Community-acquired pneumonia secondary to MRSA  Cystic fibrosis  Fever  Pancreatic insufficiency on Creon  Malnutrition with BMI of 19  Recurrent sinusitis    Hospital Course     Hospital Course:  Marcus Jensen is a 27 y.o. male past medical history of cystic fibrosis and TBI that presents to the emergency department from pulmonology clinic for evaluation of fever and tachycardia.  Patient was being seen in the outpatient setting for regularly scheduled visit and vital signs were noted.  In the emergency department found to be febrile with tachycardia and a leukocytosis of 17,000.  However, he was found to have low oxygen saturation requiring nasal cannula to maintain sats greater than 90%.  Pulmonology called and recommend broad-spectrum antibiotics and to admit for ongoing monitoring and management.  Admitted for further care, started on aggressive airway clearance, bronchodilators, broad-spectrum antibiotics.  Eventually grew MRSA in sputum, strep pneumo antigen positive.  Per pulmonology, plan to treat MRSA pneumonia with 14 days of Zyvox. CBC still elevated but trending down on the day of discharge.  He was  discharged home in stable condition on 10/3/2022.  Recommend follow-up PCP in 1 week, pulmonology within 1 month.    DISCHARGE Follow Up Recommendations for labs and diagnostics: CBC in 1 week to monitor WBC count    Day of Discharge     Vital Signs:  Temp:  [98.5 °F (36.9 °C)-99.5 °F (37.5 °C)] 98.5 °F (36.9 °C)  Heart Rate:  [102-117] 105  Resp:  [18-20] 18  BP: (118-135)/(75-87) 119/83  Physical Exam:   Constitutional: Awake, alert, no acute distress               Eyes: Pupils equal, sclerae anicteric, no conjunctival injection              HENT: NCAT, mucous membranes moist              Neck: Supple, no thyromegaly, no lymphadenopathy, trachea midline              Respiratory: Clear to auscultation bilaterally, nonlabored respirations               Cardiovascular: RRR, no murmurs, rubs, or gallops              Gastrointestinal: Positive bowel sounds, soft, nontender, nondistended              Musculoskeletal: No bilateral ankle edema, no clubbing or cyanosis to extremities              Psychiatric: Appropriate affect, cooperative              Neurologic: Oriented x 3, strength symmetric in all extremities, Cranial Nerves grossly intact to confrontation, speech clear           Discharge Details        Discharge Medications      New Medications      Instructions Start Date   linezolid 600 MG tablet  Commonly known as: Zyvox   600 mg, Oral, 2 Times Daily         Continue These Medications      Instructions Start Date   albuterol (2.5 MG/3ML) 0.083% nebulizer solution  Commonly known as: PROVENTIL   2.5 mg, Nebulization, Every 4 Hours PRN      budesonide 0.5 MG/2ML nebulizer solution  Commonly known as: PULMICORT   0.5 mg, Nebulization, 2 Times Daily      Creon 6000-47321 units capsule delayed-release particles capsule  Generic drug: pancrelipase (Lip-Prot-Amyl)   6,000 units of lipase, Oral, 3 Times Daily With Meals, And two capsules with each snack      formoterol 20 MCG/2ML nebulizer solution  Commonly known as:  Perforomist   20 mcg, Nebulization, 2 Times Daily - RT      ondansetron ODT 4 MG disintegrating tablet  Commonly known as: ZOFRAN-ODT   4 mg, Translingual, Every 8 Hours PRN      revefenacin 175 MCG/3ML nebulizer solution  Commonly known as: YUPELRI   175 mcg, Nebulization, Daily - RT             No Known Allergies    Discharge Disposition:  Home or Self Care    Diet:  Hospital:  Diet Order   Procedures   • Diet Regular       Discharge Activity:   Activity Instructions     Activity as Tolerated            CODE STATUS:  Code Status and Medical Interventions:   Ordered at: 09/30/22 2015     Code Status (Patient has no pulse and is not breathing):    CPR (Attempt to Resuscitate)     Medical Interventions (Patient has pulse or is breathing):    Full Support         No future appointments.    Additional Instructions for the Follow-ups that You Need to Schedule     Discharge Follow-up with PCP   As directed       Currently Documented PCP:    Provider, No Known    PCP Phone Number:    None     Follow Up Details: 3-5 days         Discharge Follow-up with Specified Provider: Pulmonology; 3 Weeks   As directed      To: Pulmonology    Follow Up: 3 Weeks               Pertinent  and/or Most Recent Results     PROCEDURES:   None    LAB RESULTS:      Lab 10/03/22  0623 10/02/22  0609 10/01/22  0230 09/30/22  1704   WBC 15.95* 18.94* 16.16* 17.00*   HEMOGLOBIN 11.8* 12.2* 13.0 13.7   HEMATOCRIT 35.1* 36.7* 37.8 40.1   PLATELETS 509* 474* 424 474*   NEUTROS ABS 12.01* 14.51* 12.23* 12.30*   IMMATURE GRANS (ABS) 0.10* 0.13* 0.07* 0.08*   LYMPHS ABS 2.37 2.64 2.30 2.91   MONOS ABS 0.97* 1.38* 1.36* 1.58*   EOS ABS 0.44* 0.21 0.10 0.03   MCV 82.6 82.1 81.5 81.5   LACTATE  --   --   --  1.3         Lab 10/03/22  0623 10/02/22  0609 10/01/22  0230 09/30/22  1704   SODIUM 132* 135* 135* 132*   POTASSIUM 4.2 4.0 3.8 3.5   CHLORIDE 99 99 101 93*   CO2 24.1 25.0 25.3 26.0   ANION GAP 8.9 11.0 8.7 13.0   BUN 10 8 9 9   CREATININE 0.67* 0.63*  0.67* 0.93   EGFR 132.1 134.5 132.1 116.1   GLUCOSE 103* 116* 153* 190*   CALCIUM 8.4* 8.4* 8.5* 8.9   MAGNESIUM 1.9 1.7  --   --    PHOSPHORUS 3.7 3.5  --   --    HEMOGLOBIN A1C  --   --  6.30*  --          Lab 10/02/22  0609 10/01/22  0230 09/30/22  1704   TOTAL PROTEIN  --  6.5 8.0   ALBUMIN 2.60* 2.70* 3.50   GLOBULIN  --  3.8 4.5   ALT (SGPT)  --  28 32   AST (SGOT)  --  23 18   BILIRUBIN  --  0.4 0.6   ALK PHOS  --  149* 180*                     Brief Urine Lab Results  (Last result in the past 365 days)      Color   Clarity   Blood   Leuk Est   Nitrite   Protein   CREAT   Urine HCG        09/30/22 2031 Yellow   Clear   Negative   Negative   Negative   Negative               Microbiology Results (last 10 days)     Procedure Component Value - Date/Time    S. Pneumo Ag Urine or CSF - Urine, Urine, Clean Catch [[id number]]  (Abnormal) Collected: 10/01/22 1703    Lab Status: Final result Specimen: Urine, Clean Catch Updated: 10/01/22 1802     Strep Pneumo Ag Positive    Legionella Antigen, Urine - Urine, Urine, Clean Catch [[id number]]  (Normal) Collected: 10/01/22 1703    Lab Status: Final result Specimen: Urine, Clean Catch Updated: 10/01/22 1802     LEGIONELLA ANTIGEN, URINE Negative    MRSA Screen, PCR (Inpatient) - Swab, Nares [[id number]]  (Normal) Collected: 10/01/22 1041    Lab Status: Final result Specimen: Swab from Nares Updated: 10/01/22 1338     MRSA PCR No MRSA Detected    Narrative:      The negative predictive value of this diagnostic test is high and should only be used to consider de-escalating anti-MRSA therapy. A positive result may indicate colonization with MRSA and must be correlated clinically.    Respiratory Culture - Sputum, Cough [[id number]]  (Abnormal)  (Susceptibility) Collected: 10/01/22 0108    Lab Status: Final result Specimen: Sputum from Cough Updated: 10/03/22 1036     Respiratory Culture Heavy growth (4+) Staphylococcus aureus, MRSA     Comment: Methicillin resistant Staphylococcus  aureus, Patient may be an isolation risk.         Light growth (2+) Normal Respiratory Bridgett     Gram Stain Moderate (3+) WBCs seen      Rare (1+) Gram positive bacilli      Rare (1+) Gram positive cocci in pairs and clusters      Occasional Gram negative bacilli    Susceptibility      Staphylococcus aureus, MRSA      GUSTAVO      Clindamycin Susceptible      Inducible Clindamycin Resistance Negative      Linezolid Susceptible      Oxacillin Resistant     Tetracycline Susceptible      Trimethoprim + Sulfamethoxazole Susceptible      Vancomycin Susceptible                       Susceptibility Comments     Staphylococcus aureus, MRSA    This isolate does not demonstrate inducible clindamycin resistance in vitro.    This isolate does not demonstrate inducible clindamycin resistance in vitro.               Rapid Strep A Screen - Swab, Throat [[id number]]  (Normal) Collected: 09/30/22 1746    Lab Status: Final result Specimen: Swab from Throat Updated: 09/30/22 1806     Strep A Ag Negative    Influenza Antigen, Rapid - Swab, Nasopharynx [[id number]]  (Normal) Collected: 09/30/22 1746    Lab Status: Final result Specimen: Swab from Nasopharynx Updated: 09/30/22 1813     Influenza A Ag, EIA Negative     Influenza B Ag, EIA Negative    COVID-19,APTIMA PANTHER(KOURTNEY),[facility]IE, NP/OP SWAB IN UTM/VTM/SALINE TRANSPORT MEDIA,24 HR TAT - Swab, Nasopharynx [[id number]]  (Normal) Collected: 09/30/22 1746    Lab Status: Final result Specimen: Swab from Nasopharynx Updated: 10/01/22 0052     COVID19 Not Detected    Narrative:      Fact sheet for providers: https://www.fda.gov/media/058723/download     Fact sheet for patients: https://www.fda.gov/media/526208/download    Test performed by RT PCR.    Beta Strep Culture, Throat - Swab, Throat [[id number]]  (Normal) Collected: 09/30/22 1746    Lab Status: Final result Specimen: Swab from Throat Updated: 10/02/22 0813     Throat Culture, Beta Strep No Beta Hemolytic Streptococcus Isolated     Narrative:      Group A Strep incidence is low in adults. Positive culture for Beta hemolytic Streptococcus species can reflect colonization and not true infection. Please correlate clinically.    Blood Culture - Blood, Arm, Left [[id number]]  (Normal) Collected: 09/30/22 1704    Lab Status: Preliminary result Specimen: Blood from Arm, Left Updated: 10/02/22 1717     Blood Culture No growth at 2 days    Blood Culture - Blood, Arm, Right [[id number]]  (Normal) Collected: 09/30/22 1704    Lab Status: Preliminary result Specimen: Blood from Arm, Right Updated: 10/02/22 1717     Blood Culture No growth at 2 days          XR Chest 2 View    Result Date: 9/30/2022  Impression:   1. There is mild diffuse opacities , somewhat nodular in the upper lobes, with more focal airspace opacity in the right lower lobe most concerning for multifocal pneumonia.  Recommend follow-up to resolution.     NABEEL ERIC MD       Electronically Signed and Approved By: NABEEL ERIC MD on 9/30/2022 at 18:45             CT Chest Without Contrast Diagnostic    Result Date: 10/1/2022  Impression:   1. Evidence for atypical infection/viral infection versus multifocal pneumonia superimposed on underlying chronic changes of cystic fibrosis.  Changes of fungal infection may be considered in the differential.  The consolidations are most pronounced within the lower lobes.  2. Evidence for associated reactive hilar and mediastinal lymphadenopathy.    CHRISTINA FULLER MD       Electronically Signed and Approved By: CHRISTINA FULLER MD on 10/01/2022 at 20:33                 XR Chest 2 View    Result Date: 9/30/2022  Narrative: PROCEDURE: XR CHEST 2 VW  COMPARISON: [facility], CR, CHEST PA/AP & LAT 2V, 1/09/2019, 19:33.  [facility], CR, CHEST AP/PA 1 VIEW, 1/22/2020, 6:33.  INDICATIONS: fever, elevated heart rate, cough  FINDINGS:  Stable heart and mediastinal contour.  No pleural effusion.  The there is focal opacity in the  right lower lobe best seen the lateral posterior the heart compatible with pneumonia.  Mild diffuse opacities also noted including the upper lobes.      Impression:   1. There is mild diffuse opacities , somewhat nodular in the upper lobes, with more focal airspace opacity in the right lower lobe most concerning for multifocal pneumonia.  Recommend follow-up to resolution.     NABEEL ERIC MD       Electronically Signed and Approved By: NABEEL ERIC MD on 9/30/2022 at 18:45             CT Chest Without Contrast Diagnostic    Result Date: 10/1/2022  Narrative: PROCEDURE: CT CHEST WO CONTRAST DIAGNOSTIC  COMPARISON: [facility], CT, CHEST W/O CONTRAST, 1/15/2020, 13:17.  [facility], CT, CT ABDOMEN PELVIS W CONTRAST, 3/16/2022, 10:06.  INDICATIONS: cystic fibrosis with bronchiectasis, pneumonia  TECHNIQUE: CT images were created without the administration of contrast material.   PROTOCOL:   Standard imaging protocol performed    RADIATION:   DLP: 203mGy*cm   Automated exposure control was utilized to minimize radiation dose.  FINDINGS: Bronchiectasis and peribronchial thickening are again seen throughout the lungs bilaterally.  There are superimposed dense consolidations within the lower lobes indicating developing multifocal pneumonia.  Additional hazy ill-defined appearing nodular opacities are seen throughout the upper lobes bilaterally.  There is additional consolidation seen in the superior segment of the right lower lobe.  Small airway or centrilobular nodular opacities are also seen.  The findings indicate superimposed atypical/viral infection or multifocal pneumonia superimposed on chronic changes of underlying cystic fibrosis.  There may be associated hilar lymphadenopathy.  There also appear to be enlarged lymph nodes in the subcarinal and paratracheal regions of the mediastinum.  These findings are likely related to reactive lymphadenopathy.  No axillary  lymphadenopathy is seen.  The heart and great vessels are stable.  The thyroid gland is stable.  The esophagus is unremarkable.  The limited evaluation of the upper abdomen demonstrates no definitive acute abnormalities.  Diffuse hepatic fatty infiltration is seen.  There is also fatty atrophy throughout the pancreas.  No acute osseous abnormalities are seen.       Impression:   1. Evidence for atypical infection/viral infection versus multifocal pneumonia superimposed on underlying chronic changes of cystic fibrosis.  Changes of fungal infection may be considered in the differential.  The consolidations are most pronounced within the lower lobes.  2. Evidence for associated reactive hilar and mediastinal lymphadenopathy.    CHRISTINA FULLER MD       Electronically Signed and Approved By: CHRISTINA FULLER MD on 10/01/2022 at 20:33                       Labs Pending at Discharge:  Pending Labs     Order Current Status    Fungitell B-D Glucan In process    Blood Culture - Blood, Arm, Left Preliminary result    Blood Culture - Blood, Arm, Right Preliminary result            Time spent on Discharge including face to face service:  33 minutes    Electronically signed by Silvano Weir MD, 10/03/22, 2:34 PM EDT.

## 2022-10-03 NOTE — PROGRESS NOTES
Pulmonary / Critical Care Progress Note      Patient Name: Marcus Jensen  : 1995  MRN: [id number]  Primary Care Physician:  Provider, No Known  Date of admission: 2022    Subjective   Subjective   Follow-up for cystic fibrosis with bronchiectasis, acute exacerbation, pneumonia, strep pneumo pneumonia, and staff aureus pneumonia    Over past 24 hours: Remained on broad-spectrum antibiotics.  CT scan of the chest was done.  Airway clearance therapies started.  Remained on aggressive nebulizer treatments.    No acute events overnight.     This morning,   Feels about the same  Has cough, not able to clear secretions  Having chest vest 4 times a day  Still on room air.  Has not moved around much.      Review of Systems  General:  Fatigue, Fever.  HEENT: No dysphagia, No Visual Changes, no rhinorrhea  Respiratory:  + Productive cough,+Dyspnea, mucoid yellow phlegm, No Pleuritic Pain, + wheezing, no hemoptysis  Cardiovascular: Denies chest pain, denies palpitations,+DIA, No Chest Pressure  Gastrointestinal:  No Abdominal Pain, Nausea, No Vomiting, intermittent constipation, no Diarrhea  Genitourinary:  No Dysuria, No Frequency, No Hesitancy  Musculoskeletal: No muscle pain or swelling  Endocrine:  No Heat Intolerance, No Cold Intolerance  Hematologic:  No Bleeding, No Bruising  Psychiatric:  No Anxiety, No Depression  Neurologic:  No Confusion, no Dysarthria, No Headaches  Skin:  No Rash, No Open Wounds        Objective   Objective     Vitals:   Temp:  [98.5 °F (36.9 °C)-99.5 °F (37.5 °C)] 98.5 °F (36.9 °C)  Heart Rate:  [102-117] 105  Resp:  [18-20] 18  BP: (118-135)/(75-87) 119/83  Physical Exam   Vital Signs Reviewed   Thin built male, in mild distress, has coughing fits   HEENT:  PERRL, EOMI.  OP, nares clear, no sinus tenderness  Neck:  Supple, no JVD, no thyromegaly  Lymph: no axillary, cervical, supraclavicular lymphadenopathy noted bilaterally  Chest:   Bilateral coarse mechanical ventilator breath  sounds, no wheezing, crackles or rhonchi, resonant percussion bilaterally  CV: RRR, no MGR, pulses 2+, equal.  Abd:  Soft, NT, ND, + BS, no HSM, previous PEG and surgical scar+  EXT:  no clubbing, no cyanosis, no edema, no joint tenderness  Neuro:  A&Ox3, CN grossly intact, no focal deficits.  Skin: No rashes or lesions noted      Result Review    Result Review:  I have personally reviewed the results from the time of this admission to 10/3/2022 14:00 EDT and agree with these findings:  [x]  Laboratory  [x]  Microbiology  [x]  Radiology  [x]  EKG/Telemetry   [x]  Cardiology/Vascular   []  Pathology  []  Old records  []  Other:  Most notable findings include:         Lab 10/03/22  0623 10/02/22  0609 10/01/22  0230 09/30/22  1704   WBC 15.95* 18.94* 16.16* 17.00*   HEMOGLOBIN 11.8* 12.2* 13.0 13.7   HEMATOCRIT 35.1* 36.7* 37.8 40.1   PLATELETS 509* 474* 424 474*   SODIUM 132* 135* 135* 132*   POTASSIUM 4.2 4.0 3.8 3.5   CHLORIDE 99 99 101 93*   CO2 24.1 25.0 25.3 26.0   BUN 10 8 9 9   CREATININE 0.67* 0.63* 0.67* 0.93   GLUCOSE 103* 116* 153* 190*   CALCIUM 8.4* 8.4* 8.5* 8.9   PHOSPHORUS 3.7 3.5  --   --    TOTAL PROTEIN  --   --  6.5 8.0   ALBUMIN  --  2.60* 2.70* 3.50   GLOBULIN  --   --  3.8 4.5         Assessment & Plan   Assessment / Plan     Active Hospital Problems:  Active Hospital Problems    Diagnosis    • Sepsis, due to unspecified organism, unspecified whether acute organ dysfunction present (HCC)          Impression:   Cystic fibrosis with acute exacerbation  Multifocal pneumonia  Difficulty with airway clearance  History of chronic constipation  Pancreatic insufficiency  Malnutrition with BMI of 90  Recurrent sinusitis  Strep pneumo pneumonia  Staph aureus pneumonia    Plan:   CT scan of the chest with bilateral bronchiectasis and patchy infiltrates, GGO.   Continue with Brovana, Pulmicort and DuoNeb.   Continue with Creon's 3 times a day.  Needs Vitamins ADKE fat soluble vitamines  Sensitivity  suggests 10 days of doxycycline BID should cover MRSA. I do not think there is any benefit to linezolid.  Bronchopulmonary hygiene at home flutter valve and chest vest.    Pulmonary follow up in 4-8 weeks or if sputum turns green.      Check 1 3 B D glucan pending for several days.      Stable for DC home with doxycycline for 10 days total ABX coverage.      DVT prophylaxis:  Medical DVT prophylaxis orders are present.    CODE STATUS:   Code Status (Patient has no pulse and is not breathing): CPR (Attempt to Resuscitate)  Medical Interventions (Patient has pulse or is breathing): Full Support      I personally reviewed pertinent labs, imaging and provider notes. Discussed with bedside nurse and will discuss with primary service.       Electronically signed by Beto Suarez MD, 10/3/2022, 14:00 EDT.

## 2022-10-03 NOTE — OUTREACH NOTE
Prep Survey    Flowsheet Row Responses   [facility] [place] patient discharged from? [facility]   Is LACE score < 7 ? No   Emergency Room discharge w/ pulse ox? No   Eligibility [facility]   Date of Admission 09/30/22   Date of Discharge 10/03/22   Discharge Disposition Home or Self Care   Discharge diagnosis SEPSIS   Does the patient have one of the following disease processes/diagnoses(primary or secondary)? Sepsis   Does the patient have Home health ordered? No   Is there a DME ordered? No   Comments regarding appointments Follow Up Recommendations for labs and diagnostics: CBC in 1 week to monitor WBC count   Prep survey completed? Yes          RONALD STEWART - Registered Nurse

## 2022-10-03 NOTE — PLAN OF CARE
Goal Outcome Evaluation:      Patient will d/c home today. Sent with flutter valve, educated on use.

## 2022-10-03 NOTE — PLAN OF CARE
Goal Outcome Evaluation:         Patient has a very coarse cough, states he has been getting up some white sputum with the cough.  Denies any increased shortness of breath at this time.  Received IV antibiotics through the night without any complications.  Will continue to monitor.

## 2022-10-03 NOTE — PROGRESS NOTES
[facility]   Hospitalist Progress Note  Date: 10/3/2022  Patient Name: Marcus Jensen  : 1995  MRN: [id number]  Date of admission: 2022      Subjective   Subjective     Chief Complaint: Fever    Summary: 27 y.o. male past medical history of cystic fibrosis and TBI that presents to the emergency department from pulmonology clinic for evaluation of fever and tachycardia.  Patient was being seen in the outpatient setting for regularly scheduled visit and vital signs were noted.  In the emergency department found to be febrile with tachycardia and a leukocytosis of 17,000.  However, he was found to have low oxygen saturation requiring nasal cannula to maintain sats greater than 90%.  Pulmonology called and recommend broad-spectrum antibiotics and to admit for ongoing monitoring and management.  Growing MRSA in sputum, strep pneumo antigen positive.  CBC still elevated but starting to trend down finally.  Likely no plans for bronchoscopy.  Will DC when cleared by pulmonology.    Interval Followup: No acute events overnight.  Afebrile overnight.  States he continues to feel better.  States he really never felt that bad when he came into the hospital.  Sputum production is slowing down.  Otherwise no new complaints.    Review of Systems   All systems reviewed and negative unless otherwise stated under interval follow-up    Objective   Objective     Vitals:   Temp:  [98.5 °F (36.9 °C)-99.5 °F (37.5 °C)] 98.5 °F (36.9 °C)  Heart Rate:  [102-117] 105  Resp:  [18-20] 18  BP: (118-135)/(75-87) 119/83  Physical Exam    Constitutional: Awake, alert, no acute distress    Eyes: Pupils equal, sclerae anicteric, no conjunctival injection   HENT: NCAT, mucous membranes moist   Neck: Supple, no thyromegaly, no lymphadenopathy, trachea midline   Respiratory: Clear to auscultation bilaterally, nonlabored respirations    Cardiovascular: RRR, no murmurs, rubs, or gallops   Gastrointestinal: Positive bowel sounds, soft,  nontender, nondistended   Musculoskeletal: No bilateral ankle edema, no clubbing or cyanosis to extremities   Psychiatric: Appropriate affect, cooperative   Neurologic: Oriented x 3, strength symmetric in all extremities, Cranial Nerves grossly intact to confrontation, speech clear   Skin: No rashes     Result Review    Result Review:  I have personally reviewed the results from the time of this admission to 10/3/2022 13:16 EDT and agree with these findings:  [x]  Laboratory all labs reviewed  []  Microbiology  []  Radiology  [x]  EKG/Telemetry   []  Cardiology/Vascular   []  Pathology  []  Old records  []  Other:  CBC    CBC 10/1/22 10/2/22 10/3/22   WBC 16.16 (A) 18.94 (A) 15.95 (A)   RBC 4.64 4.47 4.25   Hemoglobin 13.0 12.2 (A) 11.8 (A)   Hematocrit 37.8 36.7 (A) 35.1 (A)   MCV 81.5 82.1 82.6   MCH 28.0 27.3 27.8   MCHC 34.4 33.2 33.6   RDW 13.4 13.6 13.8   Platelets 424 474 (A) 509 (A)   (A) Abnormal value            CMP    CMP 10/1/22 10/2/22 10/3/22   Glucose 153 (A) 116 (A) 103 (A)   BUN 9 8 10   Creatinine 0.67 (A) 0.63 (A) 0.67 (A)   Sodium 135 (A) 135 (A) 132 (A)   Potassium 3.8 4.0 4.2   Chloride 101 99 99   Calcium 8.5 (A) 8.4 (A) 8.4 (A)   Albumin 2.70 (A) 2.60 (A)    Total Bilirubin 0.4     Alkaline Phosphatase 149 (A)     AST (SGOT) 23     ALT (SGPT) 28     (A) Abnormal value              Assessment & Plan   Assessment / Plan     Assessment/Plan:  Sepsis present on admission secondary to pneumonia  Community-acquired pneumonia secondary to Staph aureus and streptococcal pneumonia  Cystic fibrosis  Fever  Pancreatic insufficiency on Creon  Malnutrition with BMI of 19  Recurrent sinusitis  Therapeutic drug monitoring of vancomycin    Continue to monitor in the hospital for work-up management of the above  Pulmonology following, appreciate assistance  Sputum culture growing MRSA, strep pneumo antigen positive  Will continue broad-spectrum vancomycin and cefepime for now.  Will likely require transition  to linezolid for 14 days at discharge  Continue tobramycin nebulizers  Chest physiotherapy ordered by pulmonology  Continue with scheduled duo nebs, Pulmicort and Brovana twice daily, albuterol as needed  Leukocytosis beginning to improve  1 3 beta D glucan pending  Heparin for DVT prophylaxis  Continue appropriate home medications including Creon  Trend renal function and electrolytes with a.m. BMP  Trend Hgb and WBC with a.m. CBC     Discussed plan with RN, pulmonology    DVT prophylaxis:  Medical DVT prophylaxis orders are present.    CODE STATUS:   Code Status (Patient has no pulse and is not breathing): CPR (Attempt to Resuscitate)  Medical Interventions (Patient has pulse or is breathing): Full Support

## 2022-10-04 ENCOUNTER — TRANSITIONAL CARE MANAGEMENT TELEPHONE ENCOUNTER (OUTPATIENT)
Dept: CALL CENTER | Facility: HOSPITAL | Age: 27
End: 2022-10-04

## 2022-10-04 ENCOUNTER — TELEPHONE (OUTPATIENT)
Dept: PULMONOLOGY | Facility: CLINIC | Age: 27
End: 2022-10-04

## 2022-10-04 ENCOUNTER — OFFICE VISIT (OUTPATIENT)
Dept: INTERNAL MEDICINE | Facility: CLINIC | Age: 27
End: 2022-10-04

## 2022-10-04 VITALS
SYSTOLIC BLOOD PRESSURE: 105 MMHG | WEIGHT: 107.8 LBS | DIASTOLIC BLOOD PRESSURE: 70 MMHG | BODY MASS INDEX: 20.35 KG/M2 | OXYGEN SATURATION: 96 % | HEART RATE: 89 BPM | HEIGHT: 61 IN | TEMPERATURE: 99.9 F

## 2022-10-04 DIAGNOSIS — E84.0 CYSTIC FIBROSIS WITH PULMONARY MANIFESTATIONS: ICD-10-CM

## 2022-10-04 DIAGNOSIS — Z09 HOSPITAL DISCHARGE FOLLOW-UP: Primary | ICD-10-CM

## 2022-10-04 DIAGNOSIS — Z00.00 ANNUAL PHYSICAL EXAM: ICD-10-CM

## 2022-10-04 DIAGNOSIS — J15.212 PNEUMONIA DUE TO METHICILLIN RESISTANT STAPHYLOCOCCUS AUREUS (MRSA), UNSPECIFIED LATERALITY, UNSPECIFIED PART OF LUNG: Primary | ICD-10-CM

## 2022-10-04 DIAGNOSIS — Z11.59 NEED FOR HEPATITIS C SCREENING TEST: ICD-10-CM

## 2022-10-04 DIAGNOSIS — R73.01 IMPAIRED FASTING GLUCOSE: ICD-10-CM

## 2022-10-04 DIAGNOSIS — J15.212 PNEUMONIA DUE TO METHICILLIN RESISTANT STAPHYLOCOCCUS AUREUS (MRSA), UNSPECIFIED LATERALITY, UNSPECIFIED PART OF LUNG: ICD-10-CM

## 2022-10-04 PROCEDURE — 99214 OFFICE O/P EST MOD 30 MIN: CPT | Performed by: NURSE PRACTITIONER

## 2022-10-04 PROCEDURE — 1111F DSCHRG MED/CURRENT MED MERGE: CPT | Performed by: NURSE PRACTITIONER

## 2022-10-04 RX ORDER — PANCRELIPASE 30000; 6000; 19000 [USP'U]/1; [USP'U]/1; [USP'U]/1
6000 CAPSULE, DELAYED RELEASE PELLETS ORAL
Qty: 90 CAPSULE | Refills: 0 | Status: SHIPPED | OUTPATIENT
Start: 2022-10-04 | End: 2022-10-04 | Stop reason: SDUPTHER

## 2022-10-04 RX ORDER — PANCRELIPASE 30000; 6000; 19000 [USP'U]/1; [USP'U]/1; [USP'U]/1
6000 CAPSULE, DELAYED RELEASE PELLETS ORAL
Qty: 210 CAPSULE | Refills: 0 | Status: SHIPPED | OUTPATIENT
Start: 2022-10-04

## 2022-10-04 RX ORDER — DOXYCYCLINE HYCLATE 100 MG/1
100 CAPSULE ORAL 2 TIMES DAILY
Qty: 20 CAPSULE | Refills: 0 | Status: SHIPPED | OUTPATIENT
Start: 2022-10-04 | End: 2022-11-08

## 2022-10-04 NOTE — OUTREACH NOTE
Call Center TCM Note    Flowsheet Row Responses   [facility] patient discharged from? [facility]   Does the patient have one of the following disease processes/diagnoses(primary or secondary)? Sepsis   TCM attempt successful? No  [no verbal release but CM notes indicate sister active with POC]   Unsuccessful attempts Attempt 1  [attempted patient and sister]          Roseann Dean RN    10/4/2022, 11:35 EDT

## 2022-10-04 NOTE — OUTREACH NOTE
Call Center TCM Note    Flowsheet Row Responses   [facility] facility patient discharged from? [facility]   Does the patient have one of the following disease processes/diagnoses(primary or secondary)? Sepsis   TCM attempt successful? No   Unsuccessful attempts Attempt 2  [Patient hung up, called back again--no answer]          Roseann Dean RN    10/4/2022, 14:19 EDT

## 2022-10-04 NOTE — PROGRESS NOTES
Transitional Care Follow Up Visit  Subjective     Marcus Jose Jensen is a 27 y.o. male who presents:     1. To establish primary care.    2.  For a transitional care management visit.    Within 48 business hours after discharge our office contacted him via telephone to coordinate his care and needs.      I reviewed and discussed the details of that call along with the discharge summary, hospital problems, inpatient lab results, inpatient diagnostic studies, and consultation reports with Marcus.     Current outpatient and discharge medications have been reconciled for the patient.  Reviewed by: DARINEL Paul      Date of TCM Phone Call 10/3/2022   [facility]   Date of Admission 9/30/2022   Date of Discharge 10/3/2022   Discharge Disposition Home or Self Care     Risk for Readmission (LACE) Score: 8 (10/3/2022  6:00 AM)      History of Present Illness   Patient with history of cystic fibrosis and TBI sent to the emergency department from pulmonology clinic for  evaluation of fever and tachycardia.  In the emergency department found to be febrile with tachycardia and a leukocytosis of 17,000. The patient had low oxygen saturation requiring nasal cannula to maintain sats greater than 90%.  Pulmonology called and recommended broad-spectrum antibiotics and to admit for ongoing monitoring and management.  Admitted for further care, started on aggressive airway clearance, bronchodilators, broad-spectrum antibiotics.  Eventually grew MRSA in sputum, strep pneumo antigen positive.  Per pulmonology, plan to treat MRSA pneumonia with 14 days of Zyvox. CBC still elevated but trending down on the day of discharge.  He was discharged home on 10/3/2022.  Recommend follow-up PCP in 1 week, pulmonology within 1 month.     DISCHARGE Follow Up Recommendations for labs and diagnostics: CBC in 1 week to monitor WBC count     The following portions of the patient's history were reviewed and updated as appropriate:    He  has a past medical history of Cystic fibrosis (HCC) and Head injury.  He does not have any pertinent problems on file.  He  has no past surgical history on file.  His family history includes Asthma in his sister.  He  reports that he has never smoked. His smokeless tobacco use includes chew. He reports previous alcohol use. Drug use questions deferred to the physician.  Current Outpatient Medications   Medication Sig Dispense Refill   • albuterol (PROVENTIL) (2.5 MG/3ML) 0.083% nebulizer solution Take 2.5 mg by nebulization Every 4 (Four) Hours As Needed for Wheezing. 120 each 5   • budesonide (PULMICORT) 0.5 MG/2ML nebulizer solution Take 2 mL by nebulization 2 (Two) Times a Day. 120 mL 11   • formoterol (Perforomist) 20 MCG/2ML nebulizer solution Take 2 mL by nebulization 2 (Two) Times a Day. 120 mL 11   • pancrelipase, Lip-Prot-Amyl, (Creon) 6000-74006 units capsule delayed-release particles capsule Take 1 capsule by mouth 3 (Three) Times a Day With Meals. And two capsules with each snack 210 capsule 0   • revefenacin (YUPELRI) 175 MCG/3ML nebulizer solution Take 3 mL by nebulization Daily. 90 mL 5   • doxycycline (VIBRAMYCIN) 100 MG capsule Take 1 capsule by mouth 2 (Two) Times a Day. 20 capsule 0     No current facility-administered medications for this visit.     Current Outpatient Medications on File Prior to Visit   Medication Sig   • albuterol (PROVENTIL) (2.5 MG/3ML) 0.083% nebulizer solution Take 2.5 mg by nebulization Every 4 (Four) Hours As Needed for Wheezing.   • budesonide (PULMICORT) 0.5 MG/2ML nebulizer solution Take 2 mL by nebulization 2 (Two) Times a Day.   • formoterol (Perforomist) 20 MCG/2ML nebulizer solution Take 2 mL by nebulization 2 (Two) Times a Day.   • revefenacin (YUPELRI) 175 MCG/3ML nebulizer solution Take 3 mL by nebulization Daily.     No current facility-administered medications on file prior to visit.     He has No Known Allergies..    Review of Systems   Constitutional:  Negative for appetite change.   Respiratory: Negative for chest tightness, shortness of breath and wheezing.    Cardiovascular: Negative for chest pain and leg swelling.   Gastrointestinal: Negative for abdominal pain, constipation, nausea and vomiting.   All other systems reviewed and are negative.      Objective   Physical Exam  Vitals reviewed.   Constitutional:       General: He is not in acute distress.  HENT:      Head: Normocephalic and atraumatic.   Cardiovascular:      Rate and Rhythm: Normal rate and regular rhythm.   Pulmonary:      Effort: Pulmonary effort is normal.      Breath sounds: Normal breath sounds. No wheezing, rhonchi or rales.   Musculoskeletal:      Right lower leg: No edema.      Left lower leg: No edema.   Skin:     General: Skin is warm and dry.   Neurological:      General: No focal deficit present.      Mental Status: He is alert.   Psychiatric:         Thought Content: Thought content normal.         Assessment & Plan   Problems Addressed this Visit        Pulmonary and Pneumonias    Cystic fibrosis with pulmonary manifestations (HCC)    Relevant Medications    pancrelipase, Lip-Prot-Amyl, (Creon) 6000-02266 units capsule delayed-release particles capsule    Other Relevant Orders    Vitamin D 25 Hydroxy      Other Visit Diagnoses     Hospital discharge follow-up    -  Primary    Pneumonia due to methicillin resistant Staphylococcus aureus (MRSA), unspecified laterality, unspecified part of lung (HCC)        Relevant Orders    CBC & Differential    Need for hepatitis C screening test        Relevant Orders    Hepatitis C Antibody    Impaired fasting glucose        Relevant Orders    Hemoglobin A1c    Annual physical exam        Relevant Orders    Comprehensive Metabolic Panel    CBC & Differential    Lipid Panel    T4, Free    TSH      Diagnoses       Codes Comments    Hospital discharge follow-up    -  Primary ICD-10-CM: Z09  ICD-9-CM: V67.59     Cystic fibrosis with pulmonary  manifestations (HCC)     ICD-10-CM: E84.0  ICD-9-CM: 277.02     Pneumonia due to methicillin resistant Staphylococcus aureus (MRSA), unspecified laterality, unspecified part of lung (HCC)     ICD-10-CM: J15.212  ICD-9-CM: 482.42     Need for hepatitis C screening test     ICD-10-CM: Z11.59  ICD-9-CM: V73.89     Impaired fasting glucose     ICD-10-CM: R73.01  ICD-9-CM: 790.21     Annual physical exam     ICD-10-CM: Z00.00  ICD-9-CM: V70.0         Patient states he is feeling well. He was unable to get his antibiotic at the pharmacy due to cost of $4,000. Pulmonary was contacted and changed to doxycycline 100 mg twice a day which was sensitive according to culture result. Encouraged good nutrition and fluid intake. Follow-up with pulmonary in 1 month or sooner if needed. CBC to be done with the next week. Follow-up here in 4 weeks for annual exam.

## 2022-10-05 ENCOUNTER — TRANSITIONAL CARE MANAGEMENT TELEPHONE ENCOUNTER (OUTPATIENT)
Dept: CALL CENTER | Facility: HOSPITAL | Age: 27
End: 2022-10-05

## 2022-10-05 LAB
1,3 BETA GLUCAN SER-MCNC: <31 PG/ML
BACTERIA SPEC AEROBE CULT: NORMAL
BACTERIA SPEC AEROBE CULT: NORMAL

## 2022-10-05 NOTE — OUTREACH NOTE
Call Center TCM Note    Flowsheet Row Responses   [facility] patient discharged from? [facility]   Does the patient have one of the following disease processes/diagnoses(primary or secondary)? Sepsis   TCM attempt successful? Yes   TCM call completed? Yes   Wrap up additional comments Pt d/c from [facility] on 10/03/2022, pt yesterday completed FACE TO FACE TCM APPT with PCP [facility] MA    10/5/2022, 12:49 EDT

## 2022-10-14 ENCOUNTER — READMISSION MANAGEMENT (OUTPATIENT)
Dept: CALL CENTER | Facility: HOSPITAL | Age: 27
End: 2022-10-14

## 2022-10-14 NOTE — OUTREACH NOTE
Sepsis Week 2 Survey    Flowsheet Row Responses   [facility] patient discharged from? [facility]   Does the patient have one of the following disease processes/diagnoses(primary or secondary)? Sepsis   Week 2 attempt successful? No   Unsuccessful attempts Attempt 1          NITHYA Fenton Registered Nurse

## 2022-10-18 ENCOUNTER — READMISSION MANAGEMENT (OUTPATIENT)
Dept: CALL CENTER | Facility: HOSPITAL | Age: 27
End: 2022-10-18

## 2022-10-18 NOTE — OUTREACH NOTE
Sepsis Week 2 Survey    Flowsheet Row Responses   [facility] patient discharged from? [facility]   Does the patient have one of the following disease processes/diagnoses(primary or secondary)? Sepsis   Week 2 attempt successful? Yes   Call start time 1447   Call end time 1450   Discharge diagnosis SEPSIS   Person spoke with today (if not patient) and relationship sister Guzman   Meds reviewed with patient/caregiver? Yes   Is the patient having any side effects they believe may be caused by any medication additions or changes? No   Does the patient have all medications related to this admission filled (includes all antibiotics, inhalers, nebulizers,steroids,etc.) Yes   Is the patient taking all medications as directed (includes completed medication regime)? Yes   Does the patient have a primary care provider?  Yes   Does the patient have an appointment with their PCP within 7 days of discharge? Yes   Has the patient kept scheduled appointments due by today? Yes   Has home health visited the patient within 72 hours of discharge? N/A   Psychosocial issues? No   Did the patient receive a copy of their discharge instructions? Yes   Nursing interventions Reviewed instructions with patient   What is the patient's perception of their health status since discharge? Improving   Nursing interventions Nurse provided patient education   Is the patient/caregiver able to teach back TIME? T emperature - higher or lower than normal, I nfection - may have signs and symptoms of an infection, M ental Decline - confused, sleepy, difficult to arouse, E xtremely Ill - severe pain, discomfort, shortness of breath   Nursing interventions Nurse provided reassurance to patient, Nurse provided patient education   Is patient/caregiver able to teach back steps to recovery at home? Set small, achievable goals for return to baseline health, Rest and regain strength, Eat a balanced diet   Is the patient/caregiver able to teach back signs and  symptoms of worsening condition: Fever, Hyperthermia, Rapid heart rate (>90), Shortness of breath/rapid respiratory rate, Altered mental status(confusion/coma), Edema   If the patient is a current smoker, are they able to teach back resources for cessation? Not a smoker  [dips only]   Is the patient/caregiver able to teach back the hierarchy of who to call/visit for symptoms/problems? PCP, Specialist, Home health nurse, Urgent Care, ED, 911 Yes   Week 2 call completed? Yes          MULUGETA HOLLINS - Registered Nurse

## 2022-10-26 ENCOUNTER — READMISSION MANAGEMENT (OUTPATIENT)
Dept: CALL CENTER | Facility: HOSPITAL | Age: 27
End: 2022-10-26

## 2022-10-26 NOTE — OUTREACH NOTE
Sepsis Week 3 Survey    Flowsheet Row Responses   [facility] facility patient discharged from? [facility]   Does the patient have one of the following disease processes/diagnoses(primary or secondary)? Sepsis   Week 3 attempt successful? No   Unsuccessful attempts Attempt 1          STEVE Fenton Registered Nurse

## 2022-11-02 ENCOUNTER — READMISSION MANAGEMENT (OUTPATIENT)
Dept: CALL CENTER | Facility: HOSPITAL | Age: 27
End: 2022-11-02

## 2022-11-02 NOTE — OUTREACH NOTE
Sepsis Week 4 Survey    Flowsheet Row Responses   [facility] from? [facility]   Does the patient have one of the following disease processes/diagnoses(primary or secondary)? Sepsis   Week 4 attempt successful? No  [attempted pt and sister]          MEHREEN LUTHER - Registered Nurse

## 2022-11-03 PROCEDURE — U0004 COV-19 TEST NON-CDC HGH THRU: HCPCS | Performed by: FAMILY MEDICINE

## 2022-11-04 ENCOUNTER — TELEPHONE (OUTPATIENT)
Dept: URGENT CARE | Facility: CLINIC | Age: 27
End: 2022-11-04

## 2022-11-04 NOTE — TELEPHONE ENCOUNTER
----- Message from DARINEL Coronado sent at 11/4/2022  6:06 PM EDT -----  Please call the patient regarding his normal result.    Please let patient know that their COVID PCR test is negative. If they are continuing to have symptoms or if symptoms are worsening, they should follow up with [facility].

## 2022-11-05 ENCOUNTER — TELEPHONE (OUTPATIENT)
Dept: URGENT CARE | Facility: CLINIC | Age: 27
End: 2022-11-05

## 2022-11-06 ENCOUNTER — TELEPHONE (OUTPATIENT)
Dept: URGENT CARE | Facility: CLINIC | Age: 27
End: 2022-11-06

## 2022-11-07 ENCOUNTER — OFFICE VISIT (OUTPATIENT)
Dept: INTERNAL MEDICINE | Facility: CLINIC | Age: 27
End: 2022-11-07

## 2022-11-07 VITALS
TEMPERATURE: 97.3 F | BODY MASS INDEX: 19.94 KG/M2 | HEART RATE: 123 BPM | DIASTOLIC BLOOD PRESSURE: 78 MMHG | OXYGEN SATURATION: 94 % | SYSTOLIC BLOOD PRESSURE: 121 MMHG | RESPIRATION RATE: 20 BRPM | WEIGHT: 105.6 LBS | HEIGHT: 61 IN

## 2022-11-07 DIAGNOSIS — E84.0 CYSTIC FIBROSIS WITH PULMONARY MANIFESTATIONS: ICD-10-CM

## 2022-11-07 DIAGNOSIS — J40 BRONCHITIS: ICD-10-CM

## 2022-11-07 DIAGNOSIS — R05.1 ACUTE COUGH: Primary | ICD-10-CM

## 2022-11-07 DIAGNOSIS — J18.9 MULTIFOCAL PNEUMONIA: ICD-10-CM

## 2022-11-07 LAB
EXPIRATION DATE: NORMAL
FLUAV AG UPPER RESP QL IA.RAPID: NOT DETECTED
FLUBV AG UPPER RESP QL IA.RAPID: NOT DETECTED
INTERNAL CONTROL: NORMAL
Lab: NORMAL
SARS-COV-2 AG UPPER RESP QL IA.RAPID: NOT DETECTED

## 2022-11-07 PROCEDURE — 87428 SARSCOV & INF VIR A&B AG IA: CPT | Performed by: INTERNAL MEDICINE

## 2022-11-07 PROCEDURE — 99213 OFFICE O/P EST LOW 20 MIN: CPT | Performed by: INTERNAL MEDICINE

## 2022-11-07 RX ORDER — BENZONATATE 100 MG/1
100 CAPSULE ORAL 3 TIMES DAILY PRN
Qty: 30 CAPSULE | Refills: 0 | Status: SHIPPED | OUTPATIENT
Start: 2022-11-07

## 2022-11-07 RX ORDER — AZITHROMYCIN 250 MG/1
TABLET, FILM COATED ORAL
Qty: 6 TABLET | Refills: 0 | Status: SHIPPED | OUTPATIENT
Start: 2022-11-07 | End: 2022-11-08

## 2022-11-07 NOTE — PROGRESS NOTES
CHIEF COMPLAINT  Marcus Jensen presents to [facility] for follow-up of Cough (Pt has had a fever and cough for several days. Pt went to urgent care Thursday and was tested for covid and it was negative. ).    HPI  Here with sister  and 2 young toddlers one of them is coughing and with a runny nose during the visit.    27-year-old male with complaint of fever and cough for several days was seen at urgent clinic November 4 had negative test for COVID then.    Past medical history significant for cystic fibrosis/bronchiectasis    Current Outpatient Medications:   •  albuterol (PROVENTIL) (2.5 MG/3ML) 0.083% nebulizer solution, Take 2.5 mg by nebulization Every 4 (Four) Hours As Needed for Wheezing., Disp: 120 each, Rfl: 5  •  budesonide (PULMICORT) 0.5 MG/2ML nebulizer solution, Take 2 mL by nebulization 2 (Two) Times a Day., Disp: 120 mL, Rfl: 11  •  formoterol (Perforomist) 20 MCG/2ML nebulizer solution, Take 2 mL by nebulization 2 (Two) Times a Day., Disp: 120 mL, Rfl: 11  •  pancrelipase, Lip-Prot-Amyl, (Creon) 6000-31386 units capsule delayed-release particles capsule, Take 1 capsule by mouth 3 (Three) Times a Day With Meals. And two capsules with each snack, Disp: 210 capsule, Rfl: 0  •  revefenacin (YUPELRI) 175 MCG/3ML nebulizer solution, Take 3 mL by nebulization Daily., Disp: 90 mL, Rfl: 5  •  azithromycin (Zithromax Z-Lei) 250 MG tablet, Take 2 tablets by mouth on day 1, then 1 tablet daily on days 2-5, Disp: 6 tablet, Rfl: 0  •  benzonatate (Tessalon Perles) 100 MG capsule, Take 1 capsule by mouth 3 (Three) Times a Day As Needed for Cough., Disp: 30 capsule, Rfl: 0  •  doxycycline (VIBRAMYCIN) 100 MG capsule, Take 1 capsule by mouth 2 (Two) Times a Day., Disp: 20 capsule, Rfl: 0   PFSH reviewed.  ROS-cough , fever, postnasal drainage    OBJECTIVE  Vital Signs  Vitals:    11/07/22 1242   BP: 121/78   BP Location: Right arm   Patient Position: Sitting   Cuff Size: Adult  "  Pulse: (!) 123   Resp: 20   Temp: 97.3 °F (36.3 °C)   TempSrc: Temporal   SpO2: 94%   Weight: 47.9 kg (105 lb 9.6 oz)   Height: 154.9 cm (61\")      Body mass index is 19.95 kg/m².    Physical Exam  Vitals and nursing note reviewed.   Constitutional:       Appearance: Normal appearance.   HENT:      Head: Normocephalic and atraumatic.      Right Ear: Tympanic membrane normal.      Left Ear: Tympanic membrane normal.      Nose: Rhinorrhea present.      Comments: No sinus dc  Eyes:      Extraocular Movements: Extraocular movements intact.      Pupils: Pupils are equal, round, and reactive to light.   Cardiovascular:      Rate and Rhythm: Normal rate and regular rhythm.   Pulmonary:      Effort: Pulmonary effort is normal.      Breath sounds: Normal breath sounds.   Abdominal:      General: Abdomen is flat.      Palpations: Abdomen is soft.   Musculoskeletal:      Cervical back: Normal range of motion and neck supple.   Skin:     General: Skin is warm and dry.   Neurological:      General: No focal deficit present.      Mental Status: He is alert and oriented to person, place, and time.   Psychiatric:         Mood and Affect: Mood normal.         Behavior: Behavior normal.          RESULTS REVIEW  No results found for: PROBNP, BNP  CMP    CMP 10/1/22 10/2/22 10/3/22   Glucose 153 (A) 116 (A) 103 (A)   BUN 9 8 10   Creatinine 0.67 (A) 0.63 (A) 0.67 (A)   Sodium 135 (A) 135 (A) 132 (A)   Potassium 3.8 4.0 4.2   Chloride 101 99 99   Calcium 8.5 (A) 8.4 (A) 8.4 (A)   Albumin 2.70 (A) 2.60 (A)    Total Bilirubin 0.4     Alkaline Phosphatase 149 (A)     AST (SGOT) 23     ALT (SGPT) 28     (A) Abnormal value            CBC w/diff    CBC w/Diff 10/1/22 10/2/22 10/3/22   WBC 16.16 (A) 18.94 (A) 15.95 (A)   RBC 4.64 4.47 4.25   Hemoglobin 13.0 12.2 (A) 11.8 (A)   Hematocrit 37.8 36.7 (A) 35.1 (A)   MCV 81.5 82.1 82.6   MCH 28.0 27.3 27.8   MCHC 34.4 33.2 33.6   RDW 13.4 13.6 13.8   Platelets 424 474 (A) 509 (A)   Neutrophil Rel " % 75.8 76.6 (A) 75.2   Immature Granulocyte Rel % 0.4 0.7 (A) 0.6 (A)   Lymphocyte Rel % 14.2 (A) 13.9 (A) 14.9 (A)   Monocyte Rel % 8.4 7.3 6.1   Eosinophil Rel % 0.6 1.1 2.8   Basophil Rel % 0.6 0.4 0.4   (A) Abnormal value                No results found for: TSH   No results found for: FREET4   A1C Last 3 Results    HGBA1C Last 3 Results 5/20/22 10/1/22   Hemoglobin A1C 6.40 (A) 6.30 (A)   (A) Abnormal value             Lab Results   Component Value Date    MG 1.9 10/03/2022        XR Chest 2 View    Result Date: 9/30/2022    1. There is mild diffuse opacities , somewhat nodular in the upper lobes, with more focal airspace opacity in the right lower lobe most concerning for multifocal pneumonia.  Recommend follow-up to resolution.     NABEEL ERIC MD       Electronically Signed and Approved By: NABEEL ERCI MD on 9/30/2022 at 18:45             CT Chest Without Contrast Diagnostic    Result Date: 10/1/2022    1. Evidence for atypical infection/viral infection versus multifocal pneumonia superimposed on underlying chronic changes of cystic fibrosis.  Changes of fungal infection may be considered in the differential.  The consolidations are most pronounced within the lower lobes.  2. Evidence for associated reactive hilar and mediastinal lymphadenopathy.    CHRISTINA FULLER MD       Electronically Signed and Approved By: CHRISTINA FULLER MD on 10/01/2022 at 20:33                        ASSESSMENT & PLAN  Diagnoses and all orders for this visit:    1. Acute cough (Primary)  -     XR Chest PA & Lateral  -     azithromycin (Zithromax Z-Lei) 250 MG tablet; Take 2 tablets by mouth on day 1, then 1 tablet daily on days 2-5  Dispense: 6 tablet; Refill: 0  -     POCT SARS-CoV-2 Antigen TONJA + Flu  -     benzonatate (Tessalon Perles) 100 MG capsule; Take 1 capsule by mouth 3 (Three) Times a Day As Needed for Cough.  Dispense: 30 capsule; Refill: 0    2. Bronchitis  Assessment & Plan:  Patient with continued cough has  young kids at home who also have a cough and upper respiratory infection sent symptoms.  Seen in the ER 3 days ago had negative COVID test and was prescribed Z-Lei in case patient should need that.  Patient has not started the medication feel need to do a chest x-ray will give Z-Lei and cough medication supportive care push fluids.  Follow-up in 1 week or sooner if needed.   COVID test today was negative.    Orders:  -     XR Chest PA & Lateral  -     azithromycin (Zithromax Z-Lei) 250 MG tablet; Take 2 tablets by mouth on day 1, then 1 tablet daily on days 2-5  Dispense: 6 tablet; Refill: 0    3. Cystic fibrosis with pulmonary manifestations (HCC)  -     XR Chest PA & Lateral    Addendum November 8, 2022 2:30 PM   I spoke with patient's sister on the phone and explained that the chest x-ray showed multifocal pneumonia.  Patient is unchanged still coughing and they have not started the Zithromax either.  His sister stated there was a long line and patient did not want to wait or go there by himself to get the medication.  Explained that with patient's O2 sats 94% still with continued fever and cough the patient should go to the emergency room for further immediate evaluation.  Patient's sister verbalized understanding and will do so.      FOLLOW UP  Return in about 1 week (around 11/14/2022).  Follow-up with PCP Basilia Alonso in 7 to 10 days    Patient was given instructions and counseling regarding his condition or for health maintenance advice. Please see specific information pulled into the AVS if appropriate.

## 2022-11-07 NOTE — ASSESSMENT & PLAN NOTE
Patient with continued cough has young kids at home who also have a cough and upper respiratory infection sent symptoms.  Seen in the ER 3 days ago had negative COVID test and was prescribed Z-Lei in case patient should need that.  Patient has not started the medication feel need to do a chest x-ray will give Z-Lei and cough medication supportive care push fluids.  Follow-up in 1 week or sooner if needed.   COVID test today was negative.

## 2022-11-08 ENCOUNTER — HOSPITAL ENCOUNTER (INPATIENT)
Facility: HOSPITAL | Age: 27
LOS: 4 days | Discharge: HOME | End: 2022-11-12
Attending: EMERGENCY MEDICINE | Admitting: INTERNAL MEDICINE

## 2022-11-08 ENCOUNTER — HOSPITAL ENCOUNTER (OUTPATIENT)
Dept: GENERAL RADIOLOGY | Facility: HOSPITAL | Age: 27
Discharge: HOME | End: 2022-11-08
Admitting: INTERNAL MEDICINE

## 2022-11-08 ENCOUNTER — APPOINTMENT (OUTPATIENT)
Dept: GENERAL RADIOLOGY | Facility: HOSPITAL | Age: 27
End: 2022-11-08

## 2022-11-08 DIAGNOSIS — E84.9 CYSTIC FIBROSIS: ICD-10-CM

## 2022-11-08 DIAGNOSIS — J18.9 MULTIFOCAL PNEUMONIA: Primary | ICD-10-CM

## 2022-11-08 DIAGNOSIS — J40 BRONCHITIS: ICD-10-CM

## 2022-11-08 DIAGNOSIS — A41.9 SEPSIS, DUE TO UNSPECIFIED ORGANISM, UNSPECIFIED WHETHER ACUTE ORGAN DYSFUNCTION PRESENT: ICD-10-CM

## 2022-11-08 LAB
ALBUMIN SERPL-MCNC: 3 G/DL (ref 3.5–5.2)
ALBUMIN/GLOB SERPL: 0.7 G/DL
ALP SERPL-CCNC: 160 U/L (ref 39–117)
ALT SERPL W P-5'-P-CCNC: 19 U/L (ref 1–41)
ANION GAP SERPL CALCULATED.3IONS-SCNC: 11.1 MMOL/L (ref 5–15)
AST SERPL-CCNC: 17 U/L (ref 1–40)
BASOPHILS # BLD AUTO: 0.13 10*3/MM3 (ref 0–0.2)
BASOPHILS NFR BLD AUTO: 0.5 % (ref 0–1.5)
BILIRUB SERPL-MCNC: 0.2 MG/DL (ref 0–1.2)
BUN SERPL-MCNC: 14 MG/DL (ref 6–20)
BUN/CREAT SERPL: 17.5 (ref 7–25)
CALCIUM SPEC-SCNC: 9.2 MG/DL (ref 8.6–10.5)
CHLORIDE SERPL-SCNC: 95 MMOL/L (ref 98–107)
CO2 SERPL-SCNC: 26.9 MMOL/L (ref 22–29)
CREAT SERPL-MCNC: 0.8 MG/DL (ref 0.76–1.27)
D-LACTATE SERPL-SCNC: 0.8 MMOL/L (ref 0.5–2)
D-LACTATE SERPL-SCNC: 2.2 MMOL/L (ref 0.5–2)
DEPRECATED RDW RBC AUTO: 45.1 FL (ref 37–54)
EGFRCR SERPLBLD CKD-EPI 2021: 125.2 ML/MIN/1.73
EOSINOPHIL # BLD AUTO: 0.03 10*3/MM3 (ref 0–0.4)
EOSINOPHIL NFR BLD AUTO: 0.1 % (ref 0.3–6.2)
ERYTHROCYTE [DISTWIDTH] IN BLOOD BY AUTOMATED COUNT: 14.9 % (ref 12.3–15.4)
FLUAV AG NPH QL: NEGATIVE
FLUBV AG NPH QL IA: NEGATIVE
GLOBULIN UR ELPH-MCNC: 4.6 GM/DL
GLUCOSE BLDC GLUCOMTR-MCNC: 153 MG/DL (ref 70–99)
GLUCOSE SERPL-MCNC: 217 MG/DL (ref 65–99)
HCT VFR BLD AUTO: 38.9 % (ref 37.5–51)
HGB BLD-MCNC: 12.6 G/DL (ref 13–17.7)
HOLD SPECIMEN: NORMAL
HOLD SPECIMEN: NORMAL
IMM GRANULOCYTES # BLD AUTO: 0.13 10*3/MM3 (ref 0–0.05)
IMM GRANULOCYTES NFR BLD AUTO: 0.5 % (ref 0–0.5)
LYMPHOCYTES # BLD AUTO: 2.22 10*3/MM3 (ref 0.7–3.1)
LYMPHOCYTES NFR BLD AUTO: 9.1 % (ref 19.6–45.3)
MCH RBC QN AUTO: 27 PG (ref 26.6–33)
MCHC RBC AUTO-ENTMCNC: 32.4 G/DL (ref 31.5–35.7)
MCV RBC AUTO: 83.3 FL (ref 79–97)
MONOCYTES # BLD AUTO: 1.22 10*3/MM3 (ref 0.1–0.9)
MONOCYTES NFR BLD AUTO: 5 % (ref 5–12)
NEUTROPHILS NFR BLD AUTO: 20.58 10*3/MM3 (ref 1.7–7)
NEUTROPHILS NFR BLD AUTO: 84.8 % (ref 42.7–76)
NRBC BLD AUTO-RTO: 0 /100 WBC (ref 0–0.2)
NT-PROBNP SERPL-MCNC: <36 PG/ML (ref 0–450)
PLATELET # BLD AUTO: 659 10*3/MM3 (ref 140–450)
PMV BLD AUTO: 9.3 FL (ref 6–12)
POTASSIUM SERPL-SCNC: 5 MMOL/L (ref 3.5–5.2)
PROT SERPL-MCNC: 7.6 G/DL (ref 6–8.5)
QT INTERVAL: 281 MS
RBC # BLD AUTO: 4.67 10*6/MM3 (ref 4.14–5.8)
SODIUM SERPL-SCNC: 133 MMOL/L (ref 136–145)
TROPONIN T SERPL-MCNC: <0.01 NG/ML (ref 0–0.03)
WBC NRBC COR # BLD: 24.31 10*3/MM3 (ref 3.4–10.8)
WHOLE BLOOD HOLD COAG: NORMAL
WHOLE BLOOD HOLD SPECIMEN: NORMAL

## 2022-11-08 PROCEDURE — 25010000002 ONDANSETRON PER 1 MG: Performed by: EMERGENCY MEDICINE

## 2022-11-08 PROCEDURE — 87070 CULTURE OTHR SPECIMN AEROBIC: CPT | Performed by: EMERGENCY MEDICINE

## 2022-11-08 PROCEDURE — 80053 COMPREHEN METABOLIC PANEL: CPT | Performed by: EMERGENCY MEDICINE

## 2022-11-08 PROCEDURE — 25010000002 VANCOMYCIN 5 G RECONSTITUTED SOLUTION: Performed by: EMERGENCY MEDICINE

## 2022-11-08 PROCEDURE — 99223 1ST HOSP IP/OBS HIGH 75: CPT | Performed by: INTERNAL MEDICINE

## 2022-11-08 PROCEDURE — 82962 GLUCOSE BLOOD TEST: CPT

## 2022-11-08 PROCEDURE — 87040 BLOOD CULTURE FOR BACTERIA: CPT | Performed by: EMERGENCY MEDICINE

## 2022-11-08 PROCEDURE — 71045 X-RAY EXAM CHEST 1 VIEW: CPT

## 2022-11-08 PROCEDURE — 87804 INFLUENZA ASSAY W/OPTIC: CPT | Performed by: EMERGENCY MEDICINE

## 2022-11-08 PROCEDURE — 87077 CULTURE AEROBIC IDENTIFY: CPT | Performed by: EMERGENCY MEDICINE

## 2022-11-08 PROCEDURE — 93005 ELECTROCARDIOGRAM TRACING: CPT | Performed by: EMERGENCY MEDICINE

## 2022-11-08 PROCEDURE — 83605 ASSAY OF LACTIC ACID: CPT | Performed by: EMERGENCY MEDICINE

## 2022-11-08 PROCEDURE — 25010000002 CEFEPIME PER 500 MG: Performed by: EMERGENCY MEDICINE

## 2022-11-08 PROCEDURE — 87040 BLOOD CULTURE FOR BACTERIA: CPT

## 2022-11-08 PROCEDURE — 83880 ASSAY OF NATRIURETIC PEPTIDE: CPT | Performed by: EMERGENCY MEDICINE

## 2022-11-08 PROCEDURE — 85025 COMPLETE CBC W/AUTO DIFF WBC: CPT | Performed by: EMERGENCY MEDICINE

## 2022-11-08 PROCEDURE — 87205 SMEAR GRAM STAIN: CPT | Performed by: EMERGENCY MEDICINE

## 2022-11-08 PROCEDURE — 99285 EMERGENCY DEPT VISIT HI MDM: CPT

## 2022-11-08 PROCEDURE — 71046 X-RAY EXAM CHEST 2 VIEWS: CPT

## 2022-11-08 PROCEDURE — 63710000001 INSULIN LISPRO (HUMAN) PER 5 UNITS: Performed by: INTERNAL MEDICINE

## 2022-11-08 PROCEDURE — 36415 COLL VENOUS BLD VENIPUNCTURE: CPT

## 2022-11-08 PROCEDURE — 84484 ASSAY OF TROPONIN QUANT: CPT | Performed by: EMERGENCY MEDICINE

## 2022-11-08 PROCEDURE — 87185 SC STD ENZYME DETCJ PER NZM: CPT | Performed by: EMERGENCY MEDICINE

## 2022-11-08 RX ORDER — ONDANSETRON 2 MG/ML
4 INJECTION INTRAMUSCULAR; INTRAVENOUS ONCE
Status: COMPLETED | OUTPATIENT
Start: 2022-11-08 | End: 2022-11-08

## 2022-11-08 RX ORDER — ACETAMINOPHEN 500 MG
1000 TABLET ORAL ONCE
Status: COMPLETED | OUTPATIENT
Start: 2022-11-08 | End: 2022-11-08

## 2022-11-08 RX ORDER — ONDANSETRON 2 MG/ML
4 INJECTION INTRAMUSCULAR; INTRAVENOUS EVERY 6 HOURS PRN
Status: DISCONTINUED | OUTPATIENT
Start: 2022-11-08 | End: 2022-11-12 | Stop reason: HOSPADM

## 2022-11-08 RX ORDER — ACETAMINOPHEN 325 MG/1
650 TABLET ORAL EVERY 4 HOURS PRN
Status: DISCONTINUED | OUTPATIENT
Start: 2022-11-08 | End: 2022-11-12 | Stop reason: HOSPADM

## 2022-11-08 RX ORDER — ONDANSETRON 4 MG/1
4 TABLET, FILM COATED ORAL EVERY 6 HOURS PRN
Status: DISCONTINUED | OUTPATIENT
Start: 2022-11-08 | End: 2022-11-12 | Stop reason: HOSPADM

## 2022-11-08 RX ORDER — INSULIN LISPRO 100 [IU]/ML
2-7 INJECTION, SOLUTION INTRAVENOUS; SUBCUTANEOUS
Status: DISCONTINUED | OUTPATIENT
Start: 2022-11-08 | End: 2022-11-10

## 2022-11-08 RX ORDER — SODIUM CHLORIDE 0.9 % (FLUSH) 0.9 %
10 SYRINGE (ML) INJECTION AS NEEDED
Status: DISCONTINUED | OUTPATIENT
Start: 2022-11-08 | End: 2022-11-12 | Stop reason: HOSPADM

## 2022-11-08 RX ORDER — NITROGLYCERIN 0.4 MG/1
0.4 TABLET SUBLINGUAL
Status: DISCONTINUED | OUTPATIENT
Start: 2022-11-08 | End: 2022-11-12 | Stop reason: HOSPADM

## 2022-11-08 RX ORDER — SODIUM CHLORIDE 9 MG/ML
40 INJECTION, SOLUTION INTRAVENOUS AS NEEDED
Status: DISCONTINUED | OUTPATIENT
Start: 2022-11-08 | End: 2022-11-12 | Stop reason: HOSPADM

## 2022-11-08 RX ORDER — AZITHROMYCIN 250 MG/1
500 TABLET, FILM COATED ORAL TAKE AS DIRECTED
COMMUNITY
End: 2022-11-12 | Stop reason: HOSPADM

## 2022-11-08 RX ORDER — NICOTINE POLACRILEX 4 MG
15 LOZENGE BUCCAL
Status: DISCONTINUED | OUTPATIENT
Start: 2022-11-08 | End: 2022-11-10

## 2022-11-08 RX ORDER — SODIUM CHLORIDE 0.9 % (FLUSH) 0.9 %
10 SYRINGE (ML) INJECTION EVERY 12 HOURS SCHEDULED
Status: DISCONTINUED | OUTPATIENT
Start: 2022-11-08 | End: 2022-11-12 | Stop reason: HOSPADM

## 2022-11-08 RX ORDER — SODIUM CHLORIDE 0.9 % (FLUSH) 0.9 %
10 SYRINGE (ML) INJECTION AS NEEDED
Status: DISCONTINUED | OUTPATIENT
Start: 2022-11-08 | End: 2022-11-09

## 2022-11-08 RX ORDER — CEFEPIME 1 G/50ML
2 INJECTION, SOLUTION INTRAVENOUS ONCE
Status: COMPLETED | OUTPATIENT
Start: 2022-11-08 | End: 2022-11-08

## 2022-11-08 RX ORDER — DEXTROSE MONOHYDRATE 25 G/50ML
25 INJECTION, SOLUTION INTRAVENOUS
Status: DISCONTINUED | OUTPATIENT
Start: 2022-11-08 | End: 2022-11-10

## 2022-11-08 RX ADMIN — Medication 10 ML: at 23:26

## 2022-11-08 RX ADMIN — INSULIN LISPRO 2 UNITS: 100 INJECTION, SOLUTION INTRAVENOUS; SUBCUTANEOUS at 23:26

## 2022-11-08 RX ADMIN — SODIUM CHLORIDE 1482 ML: 9 INJECTION, SOLUTION INTRAVENOUS at 16:42

## 2022-11-08 RX ADMIN — ACETAMINOPHEN 1000 MG: 500 TABLET ORAL at 16:41

## 2022-11-08 RX ADMIN — ACETAMINOPHEN 650 MG: 325 TABLET ORAL at 23:26

## 2022-11-08 RX ADMIN — VANCOMYCIN HYDROCHLORIDE 1000 MG: 5 INJECTION, POWDER, LYOPHILIZED, FOR SOLUTION INTRAVENOUS at 17:21

## 2022-11-08 RX ADMIN — CEFEPIME 2 G: 1 INJECTION, SOLUTION INTRAVENOUS at 16:43

## 2022-11-08 RX ADMIN — ONDANSETRON 4 MG: 2 INJECTION INTRAMUSCULAR; INTRAVENOUS at 17:06

## 2022-11-08 NOTE — ED PROVIDER NOTES
"Time: 15:57 PM EST  Arrived by: private car  Chief Complaint: fever  History provided by: patient  History is limited by: vague historian due to history of TBI    History of Present Illness:  Patient is a 27 y.o. year old male who presents to the emergency department with fever.    Patient history is limited by vague historian due to history of TBI. He lives with and has had recent contact with his sister, who was diagnosed with Covid 3-4 days ago. The patient was transported to the ED by his sister after being seen by his PCP. She is not present at bedside. He states he does not feel sick, but his sister had him be seen by his PCP because he has had \"almost a week off-and-on\" of fever, per patient. He has a cough, which he reports is chronic and no worse than his baseline. He otherwise denies any symptoms, including chest pain or shortness of breath.          Similar Symptoms Previously: No  Recently seen: Yes, see HPI      Patient Care Team  Primary Care Provider: Basilia Church APRN    Past Medical History:     No Known Allergies  Past Medical History:   Diagnosis Date   • Cystic fibrosis (HCC)    • Head injury      History reviewed. No pertinent surgical history.  Family History   Problem Relation Age of Onset   • Asthma Sister        Home Medications:  Prior to Admission medications    Medication Sig Start Date End Date Taking? Authorizing Provider   albuterol (PROVENTIL) (2.5 MG/3ML) 0.083% nebulizer solution Take 2.5 mg by nebulization Every 4 (Four) Hours As Needed for Wheezing. 9/30/22   Bernie Jackson APRN   azithromycin (Zithromax Z-Lei) 250 MG tablet Take 2 tablets by mouth on day 1, then 1 tablet daily on days 2-5 11/7/22   Sonam Rosen MD   benzonatate (Tessalon Perles) 100 MG capsule Take 1 capsule by mouth 3 (Three) Times a Day As Needed for Cough. 11/7/22   Sonam Rosen MD   budesonide (PULMICORT) 0.5 MG/2ML nebulizer solution Take 2 mL by nebulization 2 " (Two) Times a Day. 9/30/22   Bernie Jackson APRN   doxycycline (VIBRAMYCIN) 100 MG capsule Take 1 capsule by mouth 2 (Two) Times a Day. 10/4/22   Bernie Jackson APRN   formoterol (Perforomist) 20 MCG/2ML nebulizer solution Take 2 mL by nebulization 2 (Two) Times a Day. 9/30/22   Bernie Jackson APRN   pancrelipase, Lip-Prot-Amyl, (Creon) 6000-92728 units capsule delayed-release particles capsule Take 1 capsule by mouth 3 (Three) Times a Day With Meals. And two capsules with each snack 10/4/22   Basilia Church APRN   revefenacin (YUPELRI) 175 MCG/3ML nebulizer solution Take 3 mL by nebulization Daily. 9/30/22   Bernie Jackson APRN        Social History:   Social History     Tobacco Use   • Smoking status: Never   • Smokeless tobacco: Current     Types: Chew   Vaping Use   • Vaping Use: Never used   Substance Use Topics   • Alcohol use: Not Currently     Comment: About a year   • Drug use: Yes     Types: Marijuana     Recent travel: no     Review of Systems:  Review of Systems   Unable to perform ROS: Other   Constitutional: Positive for fever. Negative for chills.   HENT: Negative for congestion, rhinorrhea and sore throat.    Eyes: Negative for photophobia.   Respiratory: Positive for cough (baseline). Negative for apnea, chest tightness and shortness of breath.    Cardiovascular: Negative for chest pain and palpitations.   Gastrointestinal: Negative for abdominal pain, diarrhea, nausea and vomiting.   Endocrine: Negative.    Genitourinary: Negative for difficulty urinating and dysuria.   Musculoskeletal: Negative for back pain, joint swelling and myalgias.   Skin: Negative for color change and wound.   Allergic/Immunologic: Negative.    Neurological: Negative for seizures and headaches.   Psychiatric/Behavioral: Negative.    All other systems reviewed and are negative.       Physical Exam:  /70 (BP Location: Right arm, Patient Position: Lying)   Pulse 118   Temp (!) 101.6 °F (38.7 °C) (Oral)   Resp 18    "Ht 154.9 cm (61\")   Wt 49.4 kg (109 lb)   SpO2 90%   BMI 20.60 kg/m²     Physical Exam  Vitals and nursing note reviewed.   Constitutional:       General: He is awake.      Appearance: Normal appearance.   HENT:      Head: Normocephalic and atraumatic.      Nose: Nose normal.      Mouth/Throat:      Mouth: Mucous membranes are moist.   Eyes:      Extraocular Movements: Extraocular movements intact.      Pupils: Pupils are equal, round, and reactive to light.   Cardiovascular:      Rate and Rhythm: Regular rhythm. Tachycardia present.      Heart sounds: Normal heart sounds.   Pulmonary:      Effort: Pulmonary effort is normal. No respiratory distress.      Breath sounds: Normal breath sounds. No wheezing, rhonchi or rales.   Abdominal:      General: Bowel sounds are normal.      Palpations: Abdomen is soft.      Tenderness: There is no abdominal tenderness. There is no guarding or rebound.      Comments: No rigidity   Musculoskeletal:         General: No tenderness. Normal range of motion.      Cervical back: Normal range of motion and neck supple.   Skin:     General: Skin is warm and dry.      Coloration: Skin is not jaundiced.   Neurological:      General: No focal deficit present.      Mental Status: He is alert and oriented to person, place, and time. Mental status is at baseline.      Sensory: Sensation is intact.      Motor: Motor function is intact.      Coordination: Coordination is intact.   Psychiatric:         Attention and Perception: Attention and perception normal.         Mood and Affect: Mood and affect normal.         Speech: Speech normal.         Behavior: Behavior normal.         Judgment: Judgment normal.                Medications in the Emergency Department:  Medications   sodium chloride 0.9 % flush 10 mL (has no administration in time range)   sodium chloride 0.9 % flush 10 mL (has no administration in time range)   nitroglycerin (NITROSTAT) SL tablet 0.4 mg (has no administration in time " range)   sodium chloride 0.9 % flush 10 mL (has no administration in time range)   sodium chloride 0.9 % flush 10 mL (has no administration in time range)   sodium chloride 0.9 % infusion 40 mL (has no administration in time range)   acetaminophen (TYLENOL) tablet 650 mg (has no administration in time range)   ondansetron (ZOFRAN) tablet 4 mg (has no administration in time range)     Or   ondansetron (ZOFRAN) injection 4 mg (has no administration in time range)   sodium chloride 0.9 % bolus 1,482 mL (0 mL Intravenous Stopped 11/8/22 2202)   cefepime (MAXIPIME) IVPB 2 g (premix) in D5 (0 g Intravenous Stopped 11/8/22 1721)   acetaminophen (TYLENOL) tablet 1,000 mg (1,000 mg Oral Given 11/8/22 1641)   vancomycin 1000 mg/250 mL 0.9% NS IVPB ([facility]) (0 mg Intravenous Stopped 11/8/22 1840)   ondansetron (ZOFRAN) injection 4 mg (4 mg Intravenous Given 11/8/22 1706)        Labs  Lab Results (last 24 hours)     Procedure Component Value Units Date/Time    CBC & Differential [[id number]]  (Abnormal) Collected: 11/08/22 1523    Specimen: Blood from Arm, Right Updated: 11/08/22 1533    Narrative:      The following orders were created for panel order CBC & Differential.  Procedure                               Abnormality         Status                     ---------                               -----------         ------                     CBC Auto Differential[[id number]]        Abnormal            Final result                 Please view results for these tests on the individual orders.    Comprehensive Metabolic Panel [[id number]]  (Abnormal) Collected: 11/08/22 1523    Specimen: Blood from Arm, Right Updated: 11/08/22 1601     Glucose 217 mg/dL      BUN 14 mg/dL      Creatinine 0.80 mg/dL      Sodium 133 mmol/L      Potassium 5.0 mmol/L      Chloride 95 mmol/L      CO2 26.9 mmol/L      Calcium 9.2 mg/dL      Total Protein 7.6 g/dL      Albumin 3.00 g/dL      ALT (SGPT) 19 U/L      AST (SGOT) 17 U/L      Alkaline Phosphatase  160 U/L      Total Bilirubin 0.2 mg/dL      Globulin 4.6 gm/dL      A/G Ratio 0.7 g/dL      BUN/Creatinine Ratio 17.5     Anion Gap 11.1 mmol/L      eGFR 125.2 mL/min/1.73      Comment: National Kidney Foundation and American Society of Nephrology (ASN) Task Force recommended calculation based on the Chronic Kidney Disease Epidemiology Collaboration (CKD-EPI) equation refit without adjustment for race.       Narrative:      GFR Normal >60  Chronic Kidney Disease <60  Kidney Failure <15      BNP [[id number]]  (Normal) Collected: 11/08/22 1523    Specimen: Blood from Arm, Right Updated: 11/08/22 1557     proBNP <36.0 pg/mL     Narrative:      Among patients with dyspnea, NT-proBNP is highly sensitive for the detection of acute congestive heart failure. In addition NT-proBNP of <300 pg/ml effectively rules out acute congestive heart failure with 99% negative predictive value.      Troponin [[id number]]  (Normal) Collected: 11/08/22 1523    Specimen: Blood from Arm, Right Updated: 11/08/22 1601     Troponin T <0.010 ng/mL     Narrative:      Troponin T Reference Range:  <= 0.03 ng/mL-   Negative for AMI  >0.03 ng/mL-     Abnormal for myocardial necrosis.  Clinicians would have to utilize clinical acumen, EKG, Troponin and serial changes to determine if it is an Acute Myocardial Infarction or myocardial injury due to an underlying chronic condition.       Results may be falsely decreased if patient taking Biotin.      CBC Auto Differential [[id number]]  (Abnormal) Collected: 11/08/22 1523    Specimen: Blood from Arm, Right Updated: 11/08/22 1533     WBC 24.31 10*3/mm3      RBC 4.67 10*6/mm3      Hemoglobin 12.6 g/dL      Hematocrit 38.9 %      MCV 83.3 fL      MCH 27.0 pg      MCHC 32.4 g/dL      RDW 14.9 %      RDW-SD 45.1 fl      MPV 9.3 fL      Platelets 659 10*3/mm3      Neutrophil % 84.8 %      Lymphocyte % 9.1 %      Monocyte % 5.0 %      Eosinophil % 0.1 %      Basophil % 0.5 %      Immature Grans % 0.5 %       Neutrophils, Absolute 20.58 10*3/mm3      Lymphocytes, Absolute 2.22 10*3/mm3      Monocytes, Absolute 1.22 10*3/mm3      Eosinophils, Absolute 0.03 10*3/mm3      Basophils, Absolute 0.13 10*3/mm3      Immature Grans, Absolute 0.13 10*3/mm3      nRBC 0.0 /100 WBC     Lactic Acid, Plasma [[id number]]  (Abnormal) Collected: 11/08/22 1523    Specimen: Blood from Arm, Right Updated: 11/08/22 1614     Lactate 2.2 mmol/L     Blood Culture - Blood, Arm, Right [[id number]] Collected: 11/08/22 1523    Specimen: Blood from Arm, Right Updated: 11/08/22 1528    Blood Culture - Blood, Arm, Left [[id number]] Collected: 11/08/22 1538    Specimen: Blood from Arm, Left Updated: 11/08/22 1543    Influenza Antigen, Rapid - Swab, Nasopharynx [[id number]]  (Normal) Collected: 11/08/22 1620    Specimen: Swab from Nasopharynx Updated: 11/08/22 1721     Influenza A Ag, EIA Negative     Influenza B Ag, EIA Negative    Respiratory Culture - Sputum, Cough [[id number]] Collected: 11/08/22 1841    Specimen: Sputum from Cough Updated: 11/08/22 2111     Gram Stain Few (2+) Gram negative bacilli    STAT Lactic Acid, Reflex [[id number]]  (Normal) Collected: 11/08/22 1845    Specimen: Blood Updated: 11/08/22 1913     Lactate 0.8 mmol/L            Imaging:  XR Chest 1 View    Result Date: 11/8/2022  PROCEDURE: XR CHEST 1 VW  COMPARISON: [facility], CT, CT CHEST WO CONTRAST DIAGNOSTIC, 10/01/2022, 20:21.  [facility], CR, XR CHEST 2 VW, 9/30/2022, 17:53.  [facility], CR, XR CHEST PA AND LATERAL, 11/08/2022, 14:10.  INDICATIONS: COUGH/CONGESTION/TROUBLE BREATHING  FINDINGS:  Upper lobe predominant patchy airspace opacities appear similar to the most recent prior exam.  There is bronchiectasis, as before.  No significant effusion or pneumothorax is seen.  Heart size and mediastinal contour appear unchanged.  No acute osseous abnormality is seen.        1. Patchy airspace opacities predominantly in the upper  lungs, as seen on the prior chest radiograph, which could represent multifocal pneumonia. 2. Bronchiectasis, as before, consistent with cystic fibrosis.       AUBREY HDZ MD       Electronically Signed and Approved By: AUBREY HDZ MD on 11/08/2022 at 16:26             XR Chest PA & Lateral    Result Date: 11/8/2022  PROCEDURE: XR CHEST PA AND LATERAL  COMPARISON: [facility], CR, CHEST AP/PA 1 VIEW, 1/22/2020, 6:33.  [facility], CR, XR CHEST 2 VW, 9/30/2022, 17:53.  [facility], CT, CT CHEST WO CONTRAST DIAGNOSTIC, 10/01/2022, 20:21.  INDICATIONS: PERSISTENT COUGH, BRONCHITIS. CYSTIC FIBROSIS  FINDINGS:  Redemonstration of changes of bronchiectasis with upper lobe predominance, consistent with patient's given history of cystic fibrosis.  There are patchy upper lobe predominant nodular opacities bilaterally suggestive of superimposed pneumonia which are more prominent than on prior exams.  Resolution of lower lobe opacity seen on recent chest CT.        1. Increasing multifocal upper lung predominant opacities concerning for multifocal pneumonia.     UMBERTO YOUNGBLOOD MD       Electronically Signed and Approved By: UMBERTO YOUNGBLOOD MD on 11/08/2022 at 14:12               Procedures:  Procedures    Progress                            Medical Decision Making:  MDM  Number of Diagnoses or Management Options  Cystic fibrosis (HCC): established and worsening  Multifocal pneumonia: established and worsening  Sepsis, due to unspecified organism, unspecified whether acute organ dysfunction present (HCC): new and requires workup  Diagnosis management comments: Sepsis criteria was met in the emergency department and the Sepsis protocol (including antibiotic administration) was initiated.      SIRS criteria considered:   1.  Temperature > 100.4 or <98.6    2.  Heart Rate > 90    3.  Respiratory Rate > 22    4.  WBC > 12K or <4K.             Severe Sepsis:     Respiratory: Mechanical  Ventilation or Bipap  Hypotension: SBP > 90 or MAP < 65  Renal: Creatinine > 2  Metabolic: Lactic Acid > 2  Hematologic: Platelets < 100K or INR > 1.5  Hepatic: BILI  >  2  CNS: Sudden AMS     Septic Shock:     Severe Sepsis + Persistent hypotension or Lactic Acid > 4     Normal saline bolus, Antibiotics, and final disposition was based on these definitions.        Sepsis was recognized at 16:04 EDT.    Antibiotics were ordered.     30 cc/kg bolus was indicated.       The patient was ordered 30 mL/kg fluid bolus for sepsis.    Total Critical Care time of 45 minutes. Total critical care time documented does not include time spent on separately billed procedures for services of nurses or physician assistants. I personally saw and examined the patient. I have reviewed all diagnostic interpretations and treatment plans as written. I was present for the key portions of any procedures performed and the inclusive time noted in any critical care statement. Critical care time includes patient management by me, time spent at the patients bedside,  time to review lab and imaging results, discussing patient care, documentation in the medical record, and time spent with family or caregiver.        Amount and/or Complexity of Data Reviewed  Clinical lab tests: reviewed  Tests in the radiology section of CPT®: reviewed  Tests in the medicine section of CPT®: reviewed  Decide to obtain previous medical records or to obtain history from someone other than the patient: yes  Discuss the patient with other providers: yes  Independent visualization of images, tracings, or specimens: yes    Risk of Complications, Morbidity, and/or Mortality  Presenting problems: moderate  Management options: low    Critical Care  Total time providing critical care: 30-74 minutes    Patient Progress  Patient progress: stable       Final diagnoses:   Multifocal pneumonia   Sepsis, due to unspecified organism, unspecified whether acute organ dysfunction  present (HCC)   Cystic fibrosis (HCC)        Disposition:  ED Disposition     ED Disposition   Decision to Admit    Condition   --    Comment   Level of Care: Remote Telemetry [26]   Diagnosis: Multifocal pneumonia [[id number]]   Admitting Physician: URI MCFADDEN [[id number]]   Attending Physician: URI MCFADDEN [[id number]]   Certification: I Certify That Inpatient Hospital Services Are Medically Necessary For Greater Than 2 Midnights               This medical record created using voice recognition software.    Documentation assistance provided by Nash Kay MD acting as scribe for Uri Mcfadden, *.  Information recorded by the scribe was done at my direction and has been verified and validated by me.       Jasmina Gamble  11/08/22 1606       Jasmina Gamble  11/08/22 1612       Jasmina Gamble  11/08/22 1614       Nash Kay MD  11/08/22 1778

## 2022-11-09 LAB
ANION GAP SERPL CALCULATED.3IONS-SCNC: 9.9 MMOL/L (ref 5–15)
BASOPHILS # BLD AUTO: 0.09 10*3/MM3 (ref 0–0.2)
BASOPHILS NFR BLD AUTO: 0.5 % (ref 0–1.5)
BUN SERPL-MCNC: 14 MG/DL (ref 6–20)
BUN/CREAT SERPL: 21.9 (ref 7–25)
CALCIUM SPEC-SCNC: 8.6 MG/DL (ref 8.6–10.5)
CHLORIDE SERPL-SCNC: 96 MMOL/L (ref 98–107)
CO2 SERPL-SCNC: 25.1 MMOL/L (ref 22–29)
CREAT SERPL-MCNC: 0.64 MG/DL (ref 0.76–1.27)
D-LACTATE SERPL-SCNC: 0.8 MMOL/L (ref 0.5–2)
DEPRECATED RDW RBC AUTO: 44.6 FL (ref 37–54)
EGFRCR SERPLBLD CKD-EPI 2021: 133.9 ML/MIN/1.73
EOSINOPHIL # BLD AUTO: 0.13 10*3/MM3 (ref 0–0.4)
EOSINOPHIL NFR BLD AUTO: 0.7 % (ref 0.3–6.2)
ERYTHROCYTE [DISTWIDTH] IN BLOOD BY AUTOMATED COUNT: 14.8 % (ref 12.3–15.4)
GLUCOSE BLDC GLUCOMTR-MCNC: 112 MG/DL (ref 70–99)
GLUCOSE BLDC GLUCOMTR-MCNC: 124 MG/DL (ref 70–99)
GLUCOSE BLDC GLUCOMTR-MCNC: 126 MG/DL (ref 70–99)
GLUCOSE BLDC GLUCOMTR-MCNC: 152 MG/DL (ref 70–99)
GLUCOSE SERPL-MCNC: 146 MG/DL (ref 65–99)
HCT VFR BLD AUTO: 35.1 % (ref 37.5–51)
HGB BLD-MCNC: 11.7 G/DL (ref 13–17.7)
IMM GRANULOCYTES # BLD AUTO: 0.11 10*3/MM3 (ref 0–0.05)
IMM GRANULOCYTES NFR BLD AUTO: 0.6 % (ref 0–0.5)
LYMPHOCYTES # BLD AUTO: 0.88 10*3/MM3 (ref 0.7–3.1)
LYMPHOCYTES NFR BLD AUTO: 4.7 % (ref 19.6–45.3)
MCH RBC QN AUTO: 27.5 PG (ref 26.6–33)
MCHC RBC AUTO-ENTMCNC: 33.3 G/DL (ref 31.5–35.7)
MCV RBC AUTO: 82.4 FL (ref 79–97)
MONOCYTES # BLD AUTO: 0.94 10*3/MM3 (ref 0.1–0.9)
MONOCYTES NFR BLD AUTO: 5 % (ref 5–12)
MRSA DNA SPEC QL NAA+PROBE: NORMAL
NEUTROPHILS NFR BLD AUTO: 16.62 10*3/MM3 (ref 1.7–7)
NEUTROPHILS NFR BLD AUTO: 88.5 % (ref 42.7–76)
NRBC BLD AUTO-RTO: 0 /100 WBC (ref 0–0.2)
PLATELET # BLD AUTO: 540 10*3/MM3 (ref 140–450)
PMV BLD AUTO: 9.3 FL (ref 6–12)
POTASSIUM SERPL-SCNC: 4 MMOL/L (ref 3.5–5.2)
RBC # BLD AUTO: 4.26 10*6/MM3 (ref 4.14–5.8)
SODIUM SERPL-SCNC: 131 MMOL/L (ref 136–145)
WBC NRBC COR # BLD: 18.77 10*3/MM3 (ref 3.4–10.8)

## 2022-11-09 PROCEDURE — 83605 ASSAY OF LACTIC ACID: CPT | Performed by: INTERNAL MEDICINE

## 2022-11-09 PROCEDURE — 85025 COMPLETE CBC W/AUTO DIFF WBC: CPT | Performed by: INTERNAL MEDICINE

## 2022-11-09 PROCEDURE — 80048 BASIC METABOLIC PNL TOTAL CA: CPT | Performed by: INTERNAL MEDICINE

## 2022-11-09 PROCEDURE — 63710000001 INSULIN LISPRO (HUMAN) PER 5 UNITS: Performed by: INTERNAL MEDICINE

## 2022-11-09 PROCEDURE — 82962 GLUCOSE BLOOD TEST: CPT

## 2022-11-09 PROCEDURE — 25010000002 VANCOMYCIN 5 G RECONSTITUTED SOLUTION: Performed by: INTERNAL MEDICINE

## 2022-11-09 PROCEDURE — 99233 SBSQ HOSP IP/OBS HIGH 50: CPT | Performed by: INTERNAL MEDICINE

## 2022-11-09 PROCEDURE — 99223 1ST HOSP IP/OBS HIGH 75: CPT | Performed by: INTERNAL MEDICINE

## 2022-11-09 PROCEDURE — 25010000002 CEFEPIME PER 500 MG: Performed by: INTERNAL MEDICINE

## 2022-11-09 PROCEDURE — 87641 MR-STAPH DNA AMP PROBE: CPT | Performed by: INTERNAL MEDICINE

## 2022-11-09 RX ORDER — ALBUTEROL SULFATE 2.5 MG/3ML
2.5 SOLUTION RESPIRATORY (INHALATION) EVERY 4 HOURS PRN
Status: DISCONTINUED | OUTPATIENT
Start: 2022-11-09 | End: 2022-11-09

## 2022-11-09 RX ORDER — ARFORMOTEROL TARTRATE 15 UG/2ML
15 SOLUTION RESPIRATORY (INHALATION)
Status: DISCONTINUED | OUTPATIENT
Start: 2022-11-09 | End: 2022-11-12 | Stop reason: HOSPADM

## 2022-11-09 RX ORDER — AMOXICILLIN 250 MG
1 CAPSULE ORAL 2 TIMES DAILY
Status: DISCONTINUED | OUTPATIENT
Start: 2022-11-09 | End: 2022-11-12 | Stop reason: HOSPADM

## 2022-11-09 RX ORDER — BUDESONIDE 0.5 MG/2ML
0.5 INHALANT ORAL 2 TIMES DAILY
Status: DISCONTINUED | OUTPATIENT
Start: 2022-11-09 | End: 2022-11-12 | Stop reason: HOSPADM

## 2022-11-09 RX ORDER — IPRATROPIUM BROMIDE AND ALBUTEROL SULFATE 2.5; .5 MG/3ML; MG/3ML
3 SOLUTION RESPIRATORY (INHALATION)
Status: DISCONTINUED | OUTPATIENT
Start: 2022-11-09 | End: 2022-11-11

## 2022-11-09 RX ORDER — BENZONATATE 100 MG/1
100 CAPSULE ORAL 3 TIMES DAILY PRN
Status: DISCONTINUED | OUTPATIENT
Start: 2022-11-09 | End: 2022-11-12 | Stop reason: HOSPADM

## 2022-11-09 RX ORDER — MELATONIN
1000 DAILY
Status: DISCONTINUED | OUTPATIENT
Start: 2022-11-09 | End: 2022-11-12 | Stop reason: HOSPADM

## 2022-11-09 RX ORDER — POLYETHYLENE GLYCOL 3350 17 G/17G
17 POWDER, FOR SOLUTION ORAL DAILY
Status: DISCONTINUED | OUTPATIENT
Start: 2022-11-09 | End: 2022-11-12 | Stop reason: HOSPADM

## 2022-11-09 RX ORDER — CEFEPIME 1 G/50ML
2 INJECTION, SOLUTION INTRAVENOUS EVERY 8 HOURS
Status: DISCONTINUED | OUTPATIENT
Start: 2022-11-09 | End: 2022-11-10

## 2022-11-09 RX ADMIN — Medication 10 ML: at 08:36

## 2022-11-09 RX ADMIN — ACETAMINOPHEN 650 MG: 325 TABLET ORAL at 04:26

## 2022-11-09 RX ADMIN — PANCRELIPASE 6000 UNITS OF LIPASE: 30000; 6000; 19000 CAPSULE, DELAYED RELEASE PELLETS ORAL at 11:16

## 2022-11-09 RX ADMIN — Medication 1000 UNITS: at 14:19

## 2022-11-09 RX ADMIN — ACETAMINOPHEN 650 MG: 325 TABLET ORAL at 11:16

## 2022-11-09 RX ADMIN — BENZONATATE 100 MG: 100 CAPSULE ORAL at 23:32

## 2022-11-09 RX ADMIN — INSULIN LISPRO 2 UNITS: 100 INJECTION, SOLUTION INTRAVENOUS; SUBCUTANEOUS at 20:15

## 2022-11-09 RX ADMIN — ACETAMINOPHEN 650 MG: 325 TABLET ORAL at 17:04

## 2022-11-09 RX ADMIN — VANCOMYCIN HYDROCHLORIDE 1000 MG: 5 INJECTION, POWDER, LYOPHILIZED, FOR SOLUTION INTRAVENOUS at 06:16

## 2022-11-09 RX ADMIN — CEFEPIME 2 G: 1 INJECTION, SOLUTION INTRAVENOUS at 12:07

## 2022-11-09 RX ADMIN — CEFEPIME 2 G: 1 INJECTION, SOLUTION INTRAVENOUS at 04:13

## 2022-11-09 RX ADMIN — BENZONATATE 100 MG: 100 CAPSULE ORAL at 14:25

## 2022-11-09 RX ADMIN — SODIUM CHLORIDE, POTASSIUM CHLORIDE, SODIUM LACTATE AND CALCIUM CHLORIDE 1000 ML: 600; 310; 30; 20 INJECTION, SOLUTION INTRAVENOUS at 12:07

## 2022-11-09 RX ADMIN — BENZONATATE 100 MG: 100 CAPSULE ORAL at 04:26

## 2022-11-09 RX ADMIN — PANCRELIPASE 6000 UNITS OF LIPASE: 30000; 6000; 19000 CAPSULE, DELAYED RELEASE PELLETS ORAL at 17:04

## 2022-11-09 RX ADMIN — ACETAMINOPHEN 650 MG: 325 TABLET ORAL at 23:32

## 2022-11-09 RX ADMIN — PANCRELIPASE 6000 UNITS OF LIPASE: 30000; 6000; 19000 CAPSULE, DELAYED RELEASE PELLETS ORAL at 08:36

## 2022-11-09 RX ADMIN — Medication 400 UNITS: at 14:19

## 2022-11-09 NOTE — PLAN OF CARE
Goal Outcome Evaluation:              Outcome Evaluation: AOx4. Tachycardic and running a fever, MD aware of this. Tylenol given per mar. Up ad griselda. NPO after midnight, bronchoscopy ordered for tomorrow.

## 2022-11-09 NOTE — CASE MANAGEMENT/SOCIAL WORK
Discharge Planning Assessment   [facility]     Patient Name: Marcus Jensen  MRN: [id number]  Today's Date: 11/9/2022    Admit Date: 11/8/2022    Plan: Patient unable to complete assessment, permission given to contact sister, Megan.  Call made to patient sister, RN CM role explained and discharge planning discussed. Information on facesheet verified, PCP Basilia Church, declines to be enrolled in Meds to Bed.  Megan states patient was just seen by PCP on Monday and was instructed to come to ED. Patient lives with her and her children. Patient is independent, transports self, denies issues affording medications. Megan reports patient is compliant with appointments and medications. Patient sister denies need for DME.  Plan is for patient to return home with sister on discharge and will have transportation. Discussed with ISRA, CM will continue to follow and assist for discharge planning.   Discharge Needs Assessment     Row Name 11/09/22 1214       Living Environment    People in Home sibling(s)    Name(s) of People in Home Megan, sister and her children    Current Living Arrangements other (see comments)  Mobile Home    Provides Primary Care For no one    Family Caregiver if Needed sibling(s)    Quality of Family Relationships helpful;involved;supportive    Able to Return to Prior Arrangements yes       Transition Planning    Patient/Family Anticipates Transition to home with family    Patient/Family Anticipated Services at Transition none    Transportation Anticipated car, drives self;family or friend will provide       Discharge Needs Assessment    Readmission Within the Last 30 Days no previous admission in last 30 days    Equipment Currently Used at Home nebulizer  Chest Vest    Concerns to be Addressed discharge planning    Anticipated Changes Related to Illness none    Equipment Needed After Discharge none    Provided Post Acute Provider List? N/A    Provided Post Acute Provider Quality & Resource List? N/A                Discharge Plan     Row Name 11/09/22 1230       Plan    Plan Patient unable to complete assessment, permission given to contact sister, Megan.  Call made to patient sister, RN IVAN role explained and discharge planning discussed. Information on facesheet verified, PCP Basilia Church, declines to be enrolled in Meds to Bed.  Megan states patient was just seen by PCP on Monday and was instructed to come to ED. Patient lives with her and her children. Patient is independent, transports self, denies issues affording medications. Megan reports patient is compliant with appointments and medications. Patient sister denies need for DME.  Plan is for patient to return home with sister on discharge and will have transportation. Discussed with ISRA, CM will continue to follow and assist for discharge planning.    Patient/Family in Agreement with Plan --  Sister in agreement with plan              Continued Care and Services - Admitted Since 11/8/2022    Coordination has not been started for this encounter.       Expected Discharge Date and Time     Expected Discharge Date Expected Discharge Time    Nov 11, 2022          Demographic Summary     Row Name 11/09/22 1212       General Information    Admission Type inpatient    Arrived From emergency department    Reason for Consult discharge planning    Preferred Language English       Contact Information    Permission Granted to Share Info With family/designee;pharmacist/pharmacy;               Functional Status     Row Name 11/09/22 1213       Functional Status    Usual Activity Tolerance good    Current Activity Tolerance good       Assessment of Health Literacy    How often do you have someone help you read hospital materials? Always    How often do you have problems learning about your medical condition because of difficulty understanding written information? Always    How often do you have a problem understanding what is told to you about your  medical condition? Always    How confident are you filling out medical forms by yourself? Not at all    Health Literacy Low       Functional Status, IADL    Medications assistive person    Meal Preparation independent    Housekeeping independent    Laundry independent    Shopping assistive person       Mental Status Summary    Recent Changes in Mental Status/Cognitive Functioning no changes       Employment/    Employment Status unemployed               Psychosocial    No documentation.                Abuse/Neglect    No documentation.                Legal    No documentation.                Substance Abuse    No documentation.                Patient Forms    No documentation.                   Georgia Medrano RN

## 2022-11-09 NOTE — PLAN OF CARE
Goal Outcome Evaluation:  Plan of Care Reviewed With: patient        Progress: no change  Outcome Evaluation: Pt AOx4 with flat affect. Up ad griselda. Pt has a dry nonproductive cough. Tachycardic on monitor around 120. Fever 101.6, 100.1,100.6 during shift. Medications administered as ordered.

## 2022-11-09 NOTE — CONSULTS
Pulmonary / Critical Care Consult Note      Patient Name: Marcus Jensen  : 1995  MRN: [id number]  Primary Care Physician:  Basilia Church APRN  Referring Physician: No ref. provider found  Date of admission: 2022    Subjective   Subjective     Reason for Consult/ Chief Complaint: Pneumonia, fever, history of cystic fibrosis    HPI:  Marcus Jensen is a 27 y.o. male with history of cystic fibrosis since childhood, supposed to be on chest vest and nebulizer treatment at home, noncompliance with medications, supposed to be on pancreatic enzymes at home but not taking it regularly.  He also has history of traumatic brain injury when younger.  He was admitted to hospital a month ago with MRSA pneumonia with worsening respiratory status, was discharged home on doxycycline.  He presented to the hospital with fever and worsening shortness of breath.  He has been coughing up thick mucoid phlegm.  In the emergency room, he was febrile up to 101 °F, tachycardic to 120s, had minimal oxygen desaturation.  Chest x-ray showed multifocal bilateral upper lobe infiltrate.  Pulmonary service is consulted for assistance with management of his acute and persistent pneumonia with history of cystic fibrosis.  He is a difficult historian.  He does not answer questions.  He turns around and tries to falls back to sleep at times.  He has not been taking his Creon's.       Review of Systems  General: Fatigue, Fever, Chills+  HEENT: No dysphagia, No Visual Changes, no rhinorrhea  Respiratory:  + Productive cough,+Dyspnea, + phlegm, No Pleuritic Pain, + wheezing, no hemoptysis  Cardiovascular: Denies chest pain, denies palpitations,+DIA, No Chest Pressure  Gastrointestinal:  No Abdominal Pain, No Nausea, No Vomiting, No Diarrhea  Genitourinary:  No Dysuria, No Frequency, No Hesitancy  Musculoskeletal: No muscle pain or swelling  Endocrine:  No Heat Intolerance, No Cold Intolerance  Hematologic:  No Bleeding, No  Bruising  Psychiatric:  No Anxiety, No Depression  Neurologic:  No Confusion, no Dysarthria, No Headaches, history of TBI  Skin:  No Rash, No Open Wounds        Personal History     Past Medical History:   Diagnosis Date   • Cystic fibrosis (HCC)    • Head injury        History reviewed. No pertinent surgical history.    Family History: family history includes Asthma in his sister.     Social History:  reports that he has never smoked. His smokeless tobacco use includes chew. He reports that he does not currently use alcohol. He reports current drug use. Drug: Marijuana.    Home Medications:  albuterol, azithromycin, benzonatate, budesonide, formoterol, pancrelipase (Lip-Prot-Amyl), and revefenacin    Allergies:  No Known Allergies    Objective    Objective     Vitals:   Temp:  [98.6 °F (37 °C)-103 °F (39.4 °C)] 103 °F (39.4 °C)  Heart Rate:  [106-141] 137  Resp:  [17-24] 20  BP: (112-144)/(61-85) 130/69  Flow (L/min):  [2] 2    Physical Exam:  Vital Signs Reviewed   Thin built male, in no acute distress, has coughing fits   HEENT:  PERRL, EOMI.  OP, nares clear, no sinus tenderness  Neck:  Supple, no JVD, no thyromegaly  Lymph: no axillary, cervical, supraclavicular lymphadenopathy noted bilaterally  Chest:   Bilateral diminished breath sounds, crackles bilateral rhonchi, no wheezing, crackles, resonant percussion bilaterally  CV: RRR, no MGR, pulses 2+, equal.  Abd:  Soft, NT, ND, + BS, no HSM, previous PEG and surgical scar+  EXT:  no clubbing, no cyanosis, no edema, no joint tenderness  Neuro:  A&Ox3, CN grossly intact, no focal deficits.  Skin: No rashes or lesions noted    Result Review    Result Review:  I have personally reviewed the results from the time of this admission to 11/9/2022 11:50 EST and agree with these findings:  [x]  Laboratory  [x]  Microbiology  [x]  Radiology  [x]  EKG/Telemetry   [x]  Cardiology/Vascular   []  Pathology  []  Old records  []  Other:  Most notable findings include:     CT  chest from a month ago was reviewed.  Showed bilateral multifocal infiltrate, more so in upper lobes, had patchy consolidation in right lower lobe as well.    Chest x-ray shows bilateral multifocal infiltrate again.        Lab 11/09/22  0519 11/08/22  1523   WBC 18.77* 24.31*   HEMOGLOBIN 11.7* 12.6*   HEMATOCRIT 35.1* 38.9   PLATELETS 540* 659*   SODIUM 131* 133*   POTASSIUM 4.0 5.0   CHLORIDE 96* 95*   CO2 25.1 26.9   BUN 14 14   CREATININE 0.64* 0.80   GLUCOSE 146* 217*   CALCIUM 8.6 9.2   TOTAL PROTEIN  --  7.6   ALBUMIN  --  3.00*   GLOBULIN  --  4.6         Assessment & Plan   Assessment / Plan     Active Hospital Problems:  Active Hospital Problems    Diagnosis    • **Multifocal pneumonia          Impression:  Cystic fibrosis with acute exacerbation  Significant bronchiectasis with difficulty with airway  Clearance  History of chronic constipation  Pancreatic insufficiency  Noncompliance with medications  Recurrent sinusitis    Plan:  Continue with Brovana, Pulmicort and Yupelri.    Bronchopulmonary hygiene protocol.  Continue with IV cefepime, start linezolid  Continue with Creon's 3 times a day.  Chest vest 4 times daily.  Patient noncompliant with airway clearance therapies at home.  Discussed bronchoscopy with bronchoalveolar lavage, bronchial washing, possible transbronchial biopsy, endobronchial brushing, airway inspection.  Risks and benefits of the procedure were discussed in detail.  Alternatives and options were reviewed.  Risks including pneumothorax, pneumonia, bleeding, respiratory depression and that it could be fatal were all reviewed.  Patient understands and is agreeable for the procedure.  Check a sputum culture.  Continue with Creon 3 times daily.  Vitamin ADEK.   Start laxatives.     Reviewed labs, imaging and provider notes.  Discussed with bedside nurse and primary service.    Thank you for allowing me to participate in care of Marcus.  I will follow along      Electronically signed by  Bright Worley MD, 11/9/2022, 11:50 EST.

## 2022-11-09 NOTE — PROGRESS NOTES
"[facility] Clinical [facility]acy [facility]: Vancomycin Pharmacokinetic Initial Consult Note    Marcus Jensen is a 27 y.o. male who is on day 1 of pharmacy to dose vancomycin.    Indication: Pneumonia  Consulting Provider: Dr. Crow  Planned Duration of Therapy: 5 days  Loading Dose Ordered or Given: 1000 mg on 11/8 at 1721  MRSA PCR performed: ordered 11/9; Result: TBD  Culture/Source: blood cx pending, respiratory culture gm (-) bacilli  Target: -600 mg/L.hr   Other Antimicrobials: cefepime    Vitals/Labs  Ht: 154.9 cm (61\"); Wt: 49.4 kg (109 lb)  Temp Readings from Last 1 Encounters:   11/09/22 (!) 100.6 °F (38.1 °C) (Oral)    Estimated Creatinine Clearance: 97.8 mL/min (by C-G formula based on SCr of 0.8 mg/dL).     Results from last 7 days   Lab Units 11/08/22  1523   CREATININE mg/dL 0.80   WBC 10*3/mm3 24.31*     Assessment/Plan:    Vancomycin Dose: 1000 mg IV every 12  hours  Predictive AUC level for the dose ordered is 520 mg/L.hr, which is within the target of 400-600 mg/L.hr  Vanc Trough has been ordered for 11/10 at 1500     Pharmacy will follow patient's kidney function and will adjust doses and obtain levels as necessary. Thank you for involving pharmacy in this patient's care. Please contact pharmacy with any questions or concerns.                           Fermin Trinidad, [facility]  Clinical Pharmacist    "

## 2022-11-09 NOTE — PROGRESS NOTES
[facility]   Hospitalist Progress Note  Date: 2022  Patient Name: Marcus Jensen  : 1995  MRN: [id number]  Date of admission: 2022      Subjective   Subjective     Chief Complaint:     Summary: 27 y.o. male with history of cystic fibrosis since childhood, supposed to be on chest vest and nebulizer treatment at home, noncompliance with medications, supposed to be on pancreatic enzymes at home but not taking it regularly.  He also has history of traumatic brain injury when younger.  He was admitted to hospital a month ago with MRSA pneumonia with worsening respiratory status, was discharged home on doxycycline.  He presented to the hospital with fever and worsening shortness of breath.  He has been coughing up thick mucoid phlegm.  In the emergency room, he was febrile up to 101 °F, tachycardic to 120s, had minimal oxygen desaturation.  Chest x-ray showed multifocal bilateral upper lobe infiltrate.    Interval Followup: No acute events overnight.  He was febrile overnight to one 1.6 and up to 103 this morning.  He essentially tells me he is feeling all right today except for his cough.  Coughing up thick yellow to white mucus.  His only complaint is feeling tired today.    Review of Systems   All systems reviewed and negative unless otherwise stated under interval follow-up    Objective   Objective     Vitals:   Temp:  [98.6 °F (37 °C)-103 °F (39.4 °C)] 103 °F (39.4 °C)  Heart Rate:  [106-141] 137  Resp:  [17-24] 20  BP: (112-144)/(61-85) 130/69  Flow (L/min):  [2] 2  Physical Exam    Constitutional: Awake, alert, no acute distress but coughs during exam   Eyes: Pupils equal, sclerae anicteric, no conjunctival injection   HENT: NCAT, mucous membranes moist   Neck: Supple, no thyromegaly, supraclavicular ymphadenopathy noted, trachea midline   Respiratory: Diminished breath sounds noted throughout, scattered rhonchi, no wheezing or crackles, nasal cannula in place   Cardiovascular: Tachycardic  Bedside and Verbal shift change report given to Gerardo Orosco RN (oncoming nurse) by Marycarmen Lopez RN (offgoing nurse). Report included the following information SBAR, Kardex, ED Summary, Intake/Output, MAR and Recent Results. but regular, no murmurs, rubs, or gallops   Gastrointestinal: Positive bowel sounds, soft, nontender, nondistended   Musculoskeletal: No bilateral ankle edema, no clubbing or cyanosis to extremities   Psychiatric: Flat affect, cooperative   Neurologic: Oriented x 3, strength symmetric in all extremities, Cranial Nerves grossly intact to confrontation, speech clear   Skin: No rashes     Result Review    Result Review:  I have personally reviewed the results from the time of this admission to 11/9/2022 14:58 EST and agree with these findings:  [x]  Laboratory all labs reviewed  [x]  Microbiology MRSA nares negative, strep culture growing haemophilus influenza  Influenza and COVID swabs negative  []  Radiology  [x]  EKG/Telemetry telemetry reviewed showing sinus tachycardia  []  Cardiology/Vascular   []  Pathology  []  Old records  []  Other:  CBC    CBC 10/3/22 11/8/22 11/9/22   WBC 15.95 (A) 24.31 (A) 18.77 (A)   RBC 4.25 4.67 4.26   Hemoglobin 11.8 (A) 12.6 (A) 11.7 (A)   Hematocrit 35.1 (A) 38.9 35.1 (A)   MCV 82.6 83.3 82.4   MCH 27.8 27.0 27.5   MCHC 33.6 32.4 33.3   RDW 13.8 14.9 14.8   Platelets 509 (A) 659 (A) 540 (A)   (A) Abnormal value            CMP    CMP 10/3/22 11/8/22 11/9/22   Glucose 103 (A) 217 (A) 146 (A)   BUN 10 14 14   Creatinine 0.67 (A) 0.80 0.64 (A)   Sodium 132 (A) 133 (A) 131 (A)   Potassium 4.2 5.0 4.0   Chloride 99 95 (A) 96 (A)   Calcium 8.4 (A) 9.2 8.6   Albumin  3.00 (A)    Total Bilirubin  0.2    Alkaline Phosphatase  160 (A)    AST (SGOT)  17    ALT (SGPT)  19    (A) Abnormal value              Assessment & Plan   Assessment / Plan     Assessment/Plan:  Multifocal pneumonia due haemophilus influenza  Bronchiectasis/issues with airway clearance  Cystic fibrosis with acute exacerbation  Pancreatic insufficiency  History of chronic constipation  Recurrent sinusitis     Continue to monitor in the hospital for management of the above  Consult pulmonology, appreciate assistance  Continue  with IV cefepime.  Thus far, MRSA nares negative, sputum culture growing gram-negative  Continue scheduled Pulmicort, Brovana, Yupelri, DuoNebs every 4 hours.  Bronchopulmonary hygiene  Chest vest 4 times daily, patient does not apparently use his chest vest at home as prescribed  Plan for bronchoscopy today per pulmonology  Sputum culture pending  Continue Creon 3 times a day  Start stool softeners  Continue with IV fluids  Continue appropriate home medications  Trend renal function and electrolytes with a.m. BMP  Trend Hgb and WBC with a.m. CBC    Discussed plan with RN, pulmonology    DVT prophylaxis:  Mechanical DVT prophylaxis orders are present.    CODE STATUS:   Code Status (Patient has no pulse and is not breathing): CPR (Attempt to Resuscitate)  Medical Interventions (Patient has pulse or is breathing): Full Support      Electronically signed by Silvano Weir MD, 11/09/22, 2:58 PM EST.            Given patient's physical fragileness, it is unclear if patient will be safe at home with current set-up.

## 2022-11-09 NOTE — CONSULTS
"Nutrition Services    Patient Name: Marcus Jensen  YOB: 1995  MRN: [id number]  Admission date: 11/8/2022      CLINICAL NUTRITION ASSESSMENT      Reason for Assessment  MST score 2+   H&P:    Past Medical History:   Diagnosis Date    Cystic fibrosis (HCC)     Head injury         Current Problems:   Active Hospital Problems    Diagnosis     **Multifocal pneumonia         Nutrition/Diet History         Narrative     RD consulted for MST score of 2. Pt had a 10% wt loss in 6 months. Pt stated his UBW is around 115-120#. RD conducted NFPE and found some moderate fat wasting in orbital area. Pt has a history of hyperglycemia. Pt accepted adding a oral supplement to diet. RD will continue to monitor.      Anthropometrics        Current Height, Weight Height: 154.9 cm (61\")  Weight: 48 kg (105 lb 13.1 oz)   Current BMI Body mass index is 19.99 kg/m².       Weight Hx  Wt Readings from Last 30 Encounters:   11/09/22 0300 48 kg (105 lb 13.1 oz)   11/08/22 1503 49.4 kg (109 lb)   11/07/22 1242 47.9 kg (105 lb 9.6 oz)   11/03/22 1959 49.4 kg (109 lb)   10/04/22 1434 48.9 kg (107 lb 12.8 oz)   10/01/22 0000 48 kg (105 lb 13.1 oz)   09/30/22 1639 48 kg (105 lb 13.1 oz)   09/30/22 1427 46.7 kg (103 lb)   05/20/22 0929 52.3 kg (115 lb 3.2 oz)   03/16/22 0741 51.6 kg (113 lb 12.1 oz)   03/15/22 1253 52 kg (114 lb 10.2 oz)   01/21/22 0837 54.9 kg (121 lb)   11/08/21 0853 54.4 kg (120 lb)   01/10/20 1438 50.9 kg (112 lb 4 oz)   04/16/19 0000 52.2 kg (115 lb)   01/30/19 0929 54.9 kg (121 lb 2 oz)            Wt Change Observation 10% wt loss x 6 months      Estimated/Assessed Needs       Energy Requirements Cystic Fibrosis guidelines from AND (15.2 (kg) +679) * 1.5   EST Needs (kcal/day) 2120 kcal       Protein Requirements 1.2g/kg    EST Daily Needs (g/day) 58 g        Fluid Requirements     Estimated Needs (mL/day) 2120 ml      Labs/Medications         Pertinent Labs Reviewed.   Results from last 7 days   Lab Units " 11/09/22 0519 11/08/22  1523   SODIUM mmol/L 131* 133*   POTASSIUM mmol/L 4.0 5.0   CHLORIDE mmol/L 96* 95*   CO2 mmol/L 25.1 26.9   BUN mg/dL 14 14   CREATININE mg/dL 0.64* 0.80   CALCIUM mg/dL 8.6 9.2   BILIRUBIN mg/dL  --  0.2   ALK PHOS U/L  --  160*   ALT (SGPT) U/L  --  19   AST (SGOT) U/L  --  17   GLUCOSE mg/dL 146* 217*     Results from last 7 days   Lab Units 11/09/22 0519   HEMOGLOBIN g/dL 11.7*   HEMATOCRIT % 35.1*     Lab Results   Component Value Date    HGBA1C 6.30 (H) 10/01/2022         Pertinent Medications Reviewed.     Current Nutrition Orders & Evaluation of Intake       Oral Nutrition     Current PO Diet Diet Regular   Supplement No active supplement orders       Malnutrition Severity Assessment         Malnutrition Type (last 8 hours)       Malnutrition Severity Assessment       Row Name 11/09/22 0955       Malnutrition Severity Assessment    Malnutrition Type Chronic Disease - Related Malnutrition (P)       Row Name 11/09/22 0955       Unintentional Weight Loss     Unintentional Weight Loss  Weight loss of 10% in six months (P)       Row Name 11/09/22 0955       Fat Loss    Subcutaneous Fat Loss Findings Moderate (P)     Orbital Region  Moderate -  somewhat hollowness, slightly dark circles (P)                        Nutrition Diagnosis         Nutrition Dx Problem 1 Inadequate oral Intake related to increased nutrient needs due to catabolic disease as evidenced by unintended wt change.     Nutrition Intervention         Add Boost glucose control BID (380 kcal, 32 g pro)     Medical Nutrition Therapy/Nutrition Education          Learner     Readiness Patient  Acceptance     Method     Response Explanation  Verbalizes understanding     Monitor/Evaluation        Monitor PO intake, Supplement intake, Pertinent labs, Weight     Nutrition Discharge Plan         To be determined     Electronically signed by:  Riana Orellana  11/09/22 09:47 EST

## 2022-11-09 NOTE — H&P
"[facility]   HISTORY AND PHYSICAL    Patient Name: Marcus Jensen  : 1995  MRN: [id number]  Primary Care Physician: Basilia Church APRN  Date of admission: 2022    Subjective   Subjective     Chief Complaint: Shortness of Breath    History of Present Illness  27-year-old 2019 man with cystic fibrosis and TBI who had recent admission for MRSA pneumonia (discharge 10/3/2022) presents with fever.    Due to his history of TBI, although the patient answers questions very clearly, he is very vague historian.  Some of the HPI comes from ED reports.  He lives with and has had recent contact with his sister, who was diagnosed with Covid 3-4 days ago. The patient was transported to the ED by his sister after being seen by his PCP. She is no longer present at bedside. He states he does not feel sick, but his sister had him be seen by his PCP because he has had \"almost a week off-and-on\" of fever, per patient. He has a cough, which he reports is chronic and no worse than his baseline. He otherwise denies any symptoms, including chest pain or shortness of breath.    In the ED:  Fever of 101       RR 24   /77      There is one recorded O2 sat of 88 that seem to trigger nasal cannula.  Unclear if that is accurate, when I was in the room his nasal cannula was very far from his nose and his O2 sats were solidly in the 90s.    Notable labs and studies:  -Glucose 217    WBC 24.3  Platelets 659 (chronically elevated but higher than usual)    Influenza A/P: Negative  COVID (2020): Negative   -Patient refused COVID swab today    CXR:  1. Increasing multifocal upper lung predominant opacities concerning for multifocal pneumonia.    Review of Systems   Review of systems limited by patient's TBI, by his own statement.  Constitutional: Positive for fever. Negative for chills.   HENT: Negative for congestion, rhinorrhea and sore throat.    Eyes: Negative for photophobia.   Respiratory: Positive for cough " (baseline). Negative for apnea, chest tightness and shortness of breath.    Cardiovascular: Negative for chest pain and palpitations.   Gastrointestinal: Negative for abdominal pain, diarrhea, nausea and vomiting.   Endocrine: Negative.    Genitourinary: Negative for difficulty urinating and dysuria.   Musculoskeletal: Negative for back pain, joint swelling and myalgias.   Skin: Negative for color change and wound.   Allergic/Immunologic: Negative.    Neurological: Negative for seizures and headaches.   Psychiatric/Behavioral: Negative.    All other systems reviewed and are negative.  Personal History     Past Medical History:   Diagnosis Date   • Cystic fibrosis (HCC)    • Head injury        History reviewed. No pertinent surgical history.    Family History: family history includes Asthma in his sister. Otherwise pertinent FHx was reviewed and not pertinent to current issue.    Social History:  reports that he has never smoked. His smokeless tobacco use includes chew. He reports that he does not currently use alcohol. Drug use questions deferred to the physician.    Home Medications:  albuterol, azithromycin, benzonatate, budesonide, doxycycline, formoterol, pancrelipase (Lip-Prot-Amyl), and revefenacin      Allergies:  No Known Allergies    Objective    Objective     Vitals:  Temp:  [101 °F (38.3 °C)] 101 °F (38.3 °C)  Heart Rate:  [106-127] 112  Resp:  [18-24] 18  BP: (125-144)/(77-85) 125/85  Flow (L/min):  [2] 2    Physical Exam  Constitutional:       General: Patient was sleeping; woke easily.      Appearance: Normal appearance.   HENT:      Head: Normocephalic and atraumatic.      Nose: Nose normal.      Mouth/Throat:      Mouth: Mucous membranes are moist.   Eyes:      Extraocular Movements: Extraocular movements intact.      Pupils: Pupils are equal, round, and reactive to light.   Cardiovascular:      Rate and Rhythm: Regular rhythm. Tachycardia present.      Heart sounds: Normal heart sounds.   Pulmonary:       Effort: Pulmonary effort is normal. No respiratory distress.      Breath sounds: Coarse breath sounds diffusely   Comment: Explosive cough, nonproductive  Abdominal:      General: Bowel sounds are normal.      Palpations: Abdomen is soft.      Tenderness: There is no abdominal tenderness. There is no guarding or rebound.   Musculoskeletal:         General: No tenderness. Normal range of motion.      Cervical back: Normal range of motion and neck supple.   Skin:     General: Skin is warm and dry.      Coloration: Skin is not jaundiced.   Neurological:      General: No focal deficit present.      Mental Status: He is alert and oriented to person, place.  He does not know the date but he says he never with and references his own TBI.      Sensory: Sensation is intact.      Motor: Motor function is intact.      Coordination: Coordination is intact.   Psychiatric:         Attention and Perception: Attention and perception normal.         Mood and Affect: Mood and affect normal.         Speech: Speech normal.         Behavior: Behavior normal.         Judgment: Judgment normal.   Result Review    Result Review:  I have personally reviewed the results from the time of this admission to 11/8/2022 19:24 EST and agree with these findings:  [x]  Laboratory list / accordion  [x]  Microbiology  [x]  Radiology  [x]  EKG/Telemetry   []  Cardiology/Vascular   []  Pathology  [x]  Old records  []  Other:  Most notable findings include: PNA, Sepsis        Assessment & Plan   Assessment / Plan     Brief Patient Summary:  27-year-old 2019 man with cystic fibrosis and TBI who had recent admission for MRSA pneumonia (discharge 10/3/2022) presents with fever and found to have apparent multifocal pneumonia on CXR.    Active Hospital Problems:  Active Hospital Problems    Diagnosis    • **Multifocal pneumonia        Plan:   Sepsis (fever, HR, RR, WBCs) present on admission  Multifocal pneumonia (difficult to separate from chronic  findings)  Cystic fibrosis  -Recent admission (10/7/2022) for MRSA pneumonia  -Continue vancomycin and cefepime started in the ED (pharmacy to dose)  -Continue home cystic fibrosis/pulmonary meds  [] Consider consulting ID and/or PULM as needed  [] Reassess need for O2 support    Hyperglycemia  -Unclear if any recent steroids  -Does not carry diagnosis of diabetes  -ISS  [] Monitor fingersticks and adjust insulin regimen as needed    Thrombocytosis  -Unclear etiology  -Appears to be an exacerbation of baseline elevation    DVT prophylaxis:  Not indicated    CODE STATUS:    Code Status and Medical Interventions:   Ordered at: 11/08/22 1924     Code Status (Patient has no pulse and is not breathing):    CPR (Attempt to Resuscitate)     Medical Interventions (Patient has pulse or is breathing):    Full Support       Admission Status:  I believe this patient meets inpatient status.    Uri Crow MD,

## 2022-11-10 ENCOUNTER — ANESTHESIA EVENT (OUTPATIENT)
Dept: GASTROENTEROLOGY | Facility: HOSPITAL | Age: 27
End: 2022-11-10

## 2022-11-10 ENCOUNTER — ANESTHESIA (OUTPATIENT)
Dept: GASTROENTEROLOGY | Facility: HOSPITAL | Age: 27
End: 2022-11-10

## 2022-11-10 LAB
ACB CMPLX DNA BAL NAA+NON-PRB-NCNCRNG: NOT DETECTED
ANION GAP SERPL CALCULATED.3IONS-SCNC: 11.4 MMOL/L (ref 5–15)
BACTERIA SPEC RESP CULT: ABNORMAL
BACTERIA SPEC RESP CULT: ABNORMAL
BASOPHILS # BLD AUTO: 0.07 10*3/MM3 (ref 0–0.2)
BASOPHILS NFR BLD AUTO: 0.4 % (ref 0–1.5)
BLACTX-M ISLT/SPM QL: ABNORMAL
BLAIMP ISLT/SPM QL: ABNORMAL
BLAKPC ISLT/SPM QL: ABNORMAL
BLAOXA-48-LIKE ISLT/SPM QL: ABNORMAL
BLAVIM ISLT/SPM QL: ABNORMAL
BUN SERPL-MCNC: 13 MG/DL (ref 6–20)
BUN/CREAT SERPL: 20 (ref 7–25)
C PNEUM DNA NPH QL NAA+NON-PROBE: NOT DETECTED
CALCIUM SPEC-SCNC: 8.4 MG/DL (ref 8.6–10.5)
CHLORIDE SERPL-SCNC: 96 MMOL/L (ref 98–107)
CO2 SERPL-SCNC: 25.6 MMOL/L (ref 22–29)
CREAT SERPL-MCNC: 0.65 MG/DL (ref 0.76–1.27)
CREAT SERPL-MCNC: 0.65 MG/DL (ref 0.76–1.27)
DEPRECATED RDW RBC AUTO: 43.5 FL (ref 37–54)
E CLOAC COMP DNA BAL NAA+NON-PRB-NCNCRNG: NOT DETECTED
E COLI DNA BAL NAA+NON-PRB-NCNCRNG: NOT DETECTED
EGFRCR SERPLBLD CKD-EPI 2021: 133.3 ML/MIN/1.73
EGFRCR SERPLBLD CKD-EPI 2021: 133.3 ML/MIN/1.73
EOSINOPHIL # BLD AUTO: 0.16 10*3/MM3 (ref 0–0.4)
EOSINOPHIL NFR BLD AUTO: 1 % (ref 0.3–6.2)
EOSINOPHIL NFR FLD MANUAL: 6 %
ERYTHROCYTE [DISTWIDTH] IN BLOOD BY AUTOMATED COUNT: 14.6 % (ref 12.3–15.4)
FLUAV SUBTYP SPEC NAA+PROBE: DETECTED
FLUBV RNA ISLT QL NAA+PROBE: NOT DETECTED
GLUCOSE BLDC GLUCOMTR-MCNC: 104 MG/DL (ref 70–99)
GLUCOSE BLDC GLUCOMTR-MCNC: 112 MG/DL (ref 70–99)
GLUCOSE SERPL-MCNC: 121 MG/DL (ref 65–99)
GP B STREP DNA BAL NAA+NON-PRB-NCNCRNG: NOT DETECTED
GRAM STN SPEC: ABNORMAL
HADV DNA SPEC NAA+PROBE: NOT DETECTED
HAEM INFLU DNA BAL NAA+NON-PRB-NCNCRNG: DETECTED
HCOV RNA LOWER RESP QL NAA+NON-PROBE: NOT DETECTED
HCT VFR BLD AUTO: 36.7 % (ref 37.5–51)
HGB BLD-MCNC: 12.2 G/DL (ref 13–17.7)
HMPV RNA NPH QL NAA+NON-PROBE: NOT DETECTED
HPIV RNA LOWER RESP QL NAA+NON-PROBE: NOT DETECTED
IMM GRANULOCYTES # BLD AUTO: 0.09 10*3/MM3 (ref 0–0.05)
IMM GRANULOCYTES NFR BLD AUTO: 0.5 % (ref 0–0.5)
K AEROGENES DNA BAL NAA+NON-PRB-NCNCRNG: NOT DETECTED
K OXYTOCA DNA BAL NAA+NON-PRB-NCNCRNG: NOT DETECTED
K PNEU GRP DNA BAL NAA+NON-PRB-NCNCRNG: NOT DETECTED
L PNEUMO DNA LOWER RESP QL NAA+NON-PROBE: NOT DETECTED
LYMPHOCYTES # BLD AUTO: 1.78 10*3/MM3 (ref 0.7–3.1)
LYMPHOCYTES NFR BLD AUTO: 10.7 % (ref 19.6–45.3)
LYMPHOCYTES NFR FLD MANUAL: 8 %
M CATARRHALIS DNA BAL NAA+NON-PRB-NCNCRNG: NOT DETECTED
M PNEUMO IGG SER IA-ACNC: NOT DETECTED
MACROPHAGE FLUID: 45 %
MCH RBC QN AUTO: 27.3 PG (ref 26.6–33)
MCHC RBC AUTO-ENTMCNC: 33.2 G/DL (ref 31.5–35.7)
MCV RBC AUTO: 82.1 FL (ref 79–97)
MECA+MECC ISLT/SPM QL: NOT DETECTED
MONOCYTES # BLD AUTO: 1.13 10*3/MM3 (ref 0.1–0.9)
MONOCYTES NFR BLD AUTO: 6.8 % (ref 5–12)
NDM GENE: ABNORMAL
NEUTROPHILS NFR BLD AUTO: 13.47 10*3/MM3 (ref 1.7–7)
NEUTROPHILS NFR BLD AUTO: 80.6 % (ref 42.7–76)
NEUTROPHILS NFR FLD MANUAL: 41 %
NRBC BLD AUTO-RTO: 0 /100 WBC (ref 0–0.2)
P AERUGINOSA DNA BAL NAA+NON-PRB-NCNCRNG: NOT DETECTED
PLATELET # BLD AUTO: 576 10*3/MM3 (ref 140–450)
PMV BLD AUTO: 9.3 FL (ref 6–12)
POTASSIUM SERPL-SCNC: 4.4 MMOL/L (ref 3.5–5.2)
PROTEUS SP DNA BAL NAA+NON-PRB-NCNCRNG: NOT DETECTED
RBC # BLD AUTO: 4.47 10*6/MM3 (ref 4.14–5.8)
RHINOVIRUS RNA SPEC NAA+PROBE: NOT DETECTED
RSV RNA NPH QL NAA+NON-PROBE: NOT DETECTED
S AUREUS DNA BAL NAA+NON-PRB-NCNCRNG: DETECTED
S MARCESCENS DNA BAL NAA+NON-PRB-NCNCRNG: NOT DETECTED
S PNEUM DNA BAL NAA+NON-PRB-NCNCRNG: NOT DETECTED
S PYO DNA BAL NAA+NON-PRB-NCNCRNG: NOT DETECTED
SODIUM SERPL-SCNC: 133 MMOL/L (ref 136–145)
VISUAL PRESENCE OF BLOOD: NORMAL
WBC NRBC COR # BLD: 16.7 10*3/MM3 (ref 3.4–10.8)

## 2022-11-10 PROCEDURE — 0BC68ZZ EXTIRPATION OF MATTER FROM RIGHT LOWER LOBE BRONCHUS, VIA NATURAL OR ARTIFICIAL OPENING ENDOSCOPIC: ICD-10-PCS | Performed by: INTERNAL MEDICINE

## 2022-11-10 PROCEDURE — 89051 BODY FLUID CELL COUNT: CPT | Performed by: INTERNAL MEDICINE

## 2022-11-10 PROCEDURE — 31624 DX BRONCHOSCOPE/LAVAGE: CPT | Performed by: INTERNAL MEDICINE

## 2022-11-10 PROCEDURE — 85025 COMPLETE CBC W/AUTO DIFF WBC: CPT | Performed by: INTERNAL MEDICINE

## 2022-11-10 PROCEDURE — 99233 SBSQ HOSP IP/OBS HIGH 50: CPT | Performed by: INTERNAL MEDICINE

## 2022-11-10 PROCEDURE — 87633 RESP VIRUS 12-25 TARGETS: CPT | Performed by: INTERNAL MEDICINE

## 2022-11-10 PROCEDURE — 0WCQ8ZZ EXTIRPATION OF MATTER FROM RESPIRATORY TRACT, VIA NATURAL OR ARTIFICIAL OPENING ENDOSCOPIC: ICD-10-PCS | Performed by: INTERNAL MEDICINE

## 2022-11-10 PROCEDURE — 82962 GLUCOSE BLOOD TEST: CPT

## 2022-11-10 PROCEDURE — 88108 CYTOPATH CONCENTRATE TECH: CPT | Performed by: INTERNAL MEDICINE

## 2022-11-10 PROCEDURE — 94640 AIRWAY INHALATION TREATMENT: CPT

## 2022-11-10 PROCEDURE — 63710000001 REVEFENACIN 175 MCG/3ML SOLUTION: Performed by: INTERNAL MEDICINE

## 2022-11-10 PROCEDURE — 25010000002 PROPOFOL 10 MG/ML EMULSION: Performed by: MARRIAGE & FAMILY THERAPIST

## 2022-11-10 PROCEDURE — 94799 UNLISTED PULMONARY SVC/PX: CPT

## 2022-11-10 PROCEDURE — 82565 ASSAY OF CREATININE: CPT | Performed by: INTERNAL MEDICINE

## 2022-11-10 PROCEDURE — 87252 VIRUS INOCULATION TISSUE: CPT | Performed by: INTERNAL MEDICINE

## 2022-11-10 PROCEDURE — 87205 SMEAR GRAM STAIN: CPT | Performed by: INTERNAL MEDICINE

## 2022-11-10 PROCEDURE — 80048 BASIC METABOLIC PNL TOTAL CA: CPT | Performed by: INTERNAL MEDICINE

## 2022-11-10 PROCEDURE — 87186 SC STD MICRODIL/AGAR DIL: CPT | Performed by: INTERNAL MEDICINE

## 2022-11-10 PROCEDURE — 87206 SMEAR FLUORESCENT/ACID STAI: CPT | Performed by: INTERNAL MEDICINE

## 2022-11-10 PROCEDURE — 31645 BRNCHSC W/THER ASPIR 1ST: CPT | Performed by: INTERNAL MEDICINE

## 2022-11-10 PROCEDURE — 87102 FUNGUS ISOLATION CULTURE: CPT | Performed by: INTERNAL MEDICINE

## 2022-11-10 PROCEDURE — 87147 CULTURE TYPE IMMUNOLOGIC: CPT | Performed by: INTERNAL MEDICINE

## 2022-11-10 PROCEDURE — 87071 CULTURE AEROBIC QUANT OTHER: CPT | Performed by: INTERNAL MEDICINE

## 2022-11-10 PROCEDURE — 25010000002 FENTANYL CITRATE (PF) 50 MCG/ML SOLUTION: Performed by: MARRIAGE & FAMILY THERAPIST

## 2022-11-10 PROCEDURE — 87070 CULTURE OTHR SPECIMN AEROBIC: CPT | Performed by: INTERNAL MEDICINE

## 2022-11-10 PROCEDURE — 87116 MYCOBACTERIA CULTURE: CPT | Performed by: INTERNAL MEDICINE

## 2022-11-10 PROCEDURE — 88312 SPECIAL STAINS GROUP 1: CPT | Performed by: INTERNAL MEDICINE

## 2022-11-10 PROCEDURE — 0B9F8ZX DRAINAGE OF RIGHT LOWER LUNG LOBE, VIA NATURAL OR ARTIFICIAL OPENING ENDOSCOPIC, DIAGNOSTIC: ICD-10-PCS | Performed by: INTERNAL MEDICINE

## 2022-11-10 RX ORDER — GLYCOPYRROLATE 0.2 MG/ML
INJECTION INTRAMUSCULAR; INTRAVENOUS AS NEEDED
Status: DISCONTINUED | OUTPATIENT
Start: 2022-11-10 | End: 2022-11-10 | Stop reason: SURG

## 2022-11-10 RX ORDER — SODIUM CHLORIDE, SODIUM LACTATE, POTASSIUM CHLORIDE, CALCIUM CHLORIDE 600; 310; 30; 20 MG/100ML; MG/100ML; MG/100ML; MG/100ML
30 INJECTION, SOLUTION INTRAVENOUS CONTINUOUS
Status: DISCONTINUED | OUTPATIENT
Start: 2022-11-10 | End: 2022-11-10

## 2022-11-10 RX ORDER — DEXMEDETOMIDINE HYDROCHLORIDE 100 UG/ML
INJECTION, SOLUTION INTRAVENOUS AS NEEDED
Status: DISCONTINUED | OUTPATIENT
Start: 2022-11-10 | End: 2022-11-10 | Stop reason: SURG

## 2022-11-10 RX ORDER — LINEZOLID 2 MG/ML
600 INJECTION, SOLUTION INTRAVENOUS EVERY 12 HOURS
Status: DISCONTINUED | OUTPATIENT
Start: 2022-11-10 | End: 2022-11-10

## 2022-11-10 RX ORDER — FENTANYL CITRATE 50 UG/ML
INJECTION, SOLUTION INTRAMUSCULAR; INTRAVENOUS AS NEEDED
Status: DISCONTINUED | OUTPATIENT
Start: 2022-11-10 | End: 2022-11-10 | Stop reason: SURG

## 2022-11-10 RX ORDER — KETAMINE HCL IN NACL, ISO-OSM 100MG/10ML
SYRINGE (ML) INJECTION AS NEEDED
Status: DISCONTINUED | OUTPATIENT
Start: 2022-11-10 | End: 2022-11-10 | Stop reason: SURG

## 2022-11-10 RX ORDER — LIDOCAINE HYDROCHLORIDE 40 MG/ML
INJECTION, SOLUTION RETROBULBAR; TOPICAL AS NEEDED
Status: DISCONTINUED | OUTPATIENT
Start: 2022-11-10 | End: 2022-11-10 | Stop reason: HOSPADM

## 2022-11-10 RX ORDER — LINEZOLID 600 MG/1
600 TABLET, FILM COATED ORAL EVERY 12 HOURS SCHEDULED
Status: DISCONTINUED | OUTPATIENT
Start: 2022-11-10 | End: 2022-11-10

## 2022-11-10 RX ORDER — GUAIFENESIN AND CODEINE PHOSPHATE 100; 10 MG/5ML; MG/5ML
5 SOLUTION ORAL EVERY 4 HOURS PRN
Status: DISCONTINUED | OUTPATIENT
Start: 2022-11-10 | End: 2022-11-12 | Stop reason: HOSPADM

## 2022-11-10 RX ORDER — PROPOFOL 10 MG/ML
VIAL (ML) INTRAVENOUS AS NEEDED
Status: DISCONTINUED | OUTPATIENT
Start: 2022-11-10 | End: 2022-11-10 | Stop reason: SURG

## 2022-11-10 RX ORDER — LINEZOLID 600 MG/1
600 TABLET, FILM COATED ORAL EVERY 12 HOURS SCHEDULED
Status: DISCONTINUED | OUTPATIENT
Start: 2022-11-10 | End: 2022-11-12

## 2022-11-10 RX ORDER — OSELTAMIVIR PHOSPHATE 75 MG/1
75 CAPSULE ORAL EVERY 12 HOURS SCHEDULED
Status: DISCONTINUED | OUTPATIENT
Start: 2022-11-10 | End: 2022-11-12 | Stop reason: HOSPADM

## 2022-11-10 RX ORDER — LIDOCAINE HYDROCHLORIDE 20 MG/ML
INJECTION, SOLUTION EPIDURAL; INFILTRATION; INTRACAUDAL; PERINEURAL AS NEEDED
Status: DISCONTINUED | OUTPATIENT
Start: 2022-11-10 | End: 2022-11-10 | Stop reason: SURG

## 2022-11-10 RX ORDER — AMOXICILLIN AND CLAVULANATE POTASSIUM 875; 125 MG/1; MG/1
1 TABLET, FILM COATED ORAL EVERY 12 HOURS SCHEDULED
Status: DISCONTINUED | OUTPATIENT
Start: 2022-11-10 | End: 2022-11-12 | Stop reason: HOSPADM

## 2022-11-10 RX ORDER — MAGNESIUM HYDROXIDE 1200 MG/15ML
LIQUID ORAL AS NEEDED
Status: DISCONTINUED | OUTPATIENT
Start: 2022-11-10 | End: 2022-11-10 | Stop reason: HOSPADM

## 2022-11-10 RX ADMIN — Medication 10 ML: at 20:34

## 2022-11-10 RX ADMIN — Medication 1000 UNITS: at 08:00

## 2022-11-10 RX ADMIN — BUDESONIDE 0.5 MG: 0.5 INHALANT ORAL at 07:14

## 2022-11-10 RX ADMIN — IPRATROPIUM BROMIDE AND ALBUTEROL SULFATE 3 ML: 2.5; .5 SOLUTION RESPIRATORY (INHALATION) at 18:50

## 2022-11-10 RX ADMIN — BUDESONIDE 0.5 MG: 0.5 INHALANT ORAL at 18:50

## 2022-11-10 RX ADMIN — Medication 400 UNITS: at 08:00

## 2022-11-10 RX ADMIN — Medication 20 MG: at 11:20

## 2022-11-10 RX ADMIN — Medication 10 MG: at 11:28

## 2022-11-10 RX ADMIN — LINEZOLID 600 MG: 600 TABLET, FILM COATED ORAL at 17:12

## 2022-11-10 RX ADMIN — PROPOFOL 100 MG: 10 INJECTION, EMULSION INTRAVENOUS at 11:20

## 2022-11-10 RX ADMIN — PANCRELIPASE 6000 UNITS OF LIPASE: 30000; 6000; 19000 CAPSULE, DELAYED RELEASE PELLETS ORAL at 07:59

## 2022-11-10 RX ADMIN — GLYCOPYRROLATE 0.1 MG: 0.2 INJECTION INTRAMUSCULAR; INTRAVENOUS at 11:20

## 2022-11-10 RX ADMIN — GUAIFENESIN AND CODEINE PHOSPHATE 5 ML: 100; 10 SOLUTION ORAL at 00:52

## 2022-11-10 RX ADMIN — ACETAMINOPHEN 650 MG: 325 TABLET ORAL at 07:58

## 2022-11-10 RX ADMIN — LIDOCAINE HYDROCHLORIDE 50 MG: 20 INJECTION, SOLUTION EPIDURAL; INFILTRATION; INTRACAUDAL; PERINEURAL at 11:20

## 2022-11-10 RX ADMIN — PROPOFOL 155 MCG/KG/MIN: 10 INJECTION, EMULSION INTRAVENOUS at 11:20

## 2022-11-10 RX ADMIN — OSELTAMIVIR PHOSPHATE 75 MG: 75 CAPSULE ORAL at 22:11

## 2022-11-10 RX ADMIN — Medication 20 MG: at 11:24

## 2022-11-10 RX ADMIN — PANCRELIPASE 6000 UNITS OF LIPASE: 30000; 6000; 19000 CAPSULE, DELAYED RELEASE PELLETS ORAL at 12:58

## 2022-11-10 RX ADMIN — ARFORMOTEROL TARTRATE 15 MCG: 15 SOLUTION RESPIRATORY (INHALATION) at 18:50

## 2022-11-10 RX ADMIN — ACETAMINOPHEN 650 MG: 325 TABLET ORAL at 20:33

## 2022-11-10 RX ADMIN — ACETAMINOPHEN 650 MG: 325 TABLET ORAL at 15:41

## 2022-11-10 RX ADMIN — SODIUM CHLORIDE, POTASSIUM CHLORIDE, SODIUM LACTATE AND CALCIUM CHLORIDE: 600; 310; 30; 20 INJECTION, SOLUTION INTRAVENOUS at 11:17

## 2022-11-10 RX ADMIN — FENTANYL CITRATE 50 MCG: 50 INJECTION, SOLUTION INTRAMUSCULAR; INTRAVENOUS at 11:25

## 2022-11-10 RX ADMIN — ARFORMOTEROL TARTRATE 15 MCG: 15 SOLUTION RESPIRATORY (INHALATION) at 07:14

## 2022-11-10 RX ADMIN — FENTANYL CITRATE 50 MCG: 50 INJECTION, SOLUTION INTRAMUSCULAR; INTRAVENOUS at 11:20

## 2022-11-10 RX ADMIN — AMOXICILLIN AND CLAVULANATE POTASSIUM 1 TABLET: 875; 125 TABLET, FILM COATED ORAL at 20:34

## 2022-11-10 RX ADMIN — PANCRELIPASE 6000 UNITS OF LIPASE: 30000; 6000; 19000 CAPSULE, DELAYED RELEASE PELLETS ORAL at 17:12

## 2022-11-10 RX ADMIN — DEXMEDETOMIDINE HYDROCHLORIDE 40 MCG: 100 INJECTION, SOLUTION, CONCENTRATE INTRAVENOUS at 11:20

## 2022-11-10 RX ADMIN — REVEFENACIN 175 MCG: 175 SOLUTION RESPIRATORY (INHALATION) at 07:14

## 2022-11-10 NOTE — PAYOR COMM NOTE
"Shane Caicedo (27 y.o. Male)     Date of Birth   1995    Social Security Number       Address   [address] [place] [address] [address] [place] [address]    Home Phone   [phone]    MRN   [id number]       [employer]   None    Marital Status                               Admission Date   22    Admission Type   Emergency    Admitting Provider   Silvano Whipple MD    Attending Provider   Silvano Whipple MD    Department, Room/Bed   [address], 4027/1       Discharge Date       Discharge Disposition       Discharge Destination                               Attending Provider: Silvano Whipple MD    Allergies: No Known Allergies    Isolation: None   Infection: None   Code Status: CPR    Ht: 154.9 cm (61\")   Wt: 48 kg (105 lb 13.1 oz)    Admission Cmt: None   Principal Problem: Multifocal pneumonia [J18.9]                 Active Insurance as of 2022     Primary Coverage     Payor Plan Insurance Group Employer/Plan Group    [facility] BY JULIET [id number]     Payor Plan Address Payor Plan Phone Number Payor Plan Fax Number Effective Dates    [address] [address]   2021 - None Entered    [facility] [phone]       Subscriber Name Subscriber Birth Date Member ID       SHANE CAICEDO 1995 [id number]                 Emergency Contacts      (Rel.) Home Phone Work Phone Mobile Phone    Megan Caicedo (Sister) -- -- [phone]    TI CAICEDO (Father) -- -- [phone]        Update  Auth/days still pending    CONTACT   LORENA S UTILIZATION REVIEW    [facility]  [address] [address]  TAX ID [id number]  NPI  [id number]    PH   [phone]   [phone]       Physician Progress Notes (last 24 hours)      Bright Worley MD at 11/10/22 0823          Pulmonary / Critical Care Progress Note      Patient Name: Shane Caicedo  : 1995  MRN: [id number]  Primary Care Physician:  Ranjit" DARINEL Cui  Date of admission: 11/8/2022    Subjective   Subjective   Follow-up for cystic fibrosis with acute exacerbation, bronchitis, pneumonia with haemophilus.     Over past 24 hours:  Remained on IV cefepime.  Remained on nebulizers including Brovana, Pulmicort and DuoNeb.  Started on vitamins and Creon.     No acute events overnight.     This morning,   Continues to have cough.  Not able to produce much phlegm.  Has no chest pain or chest tightness.  No nausea or vomiting.  Had fever up to 103 °F overnight.  Still has some chills.  NPO for the procedure.       Review of Systems  General:  Fatigue, Fever, chills+  HEENT: No dysphagia, No Visual Changes, no rhinorrhea  Respiratory:  + cough,+Dyspnea, intermittent phlegm, No Pleuritic Pain, + wheezing, no hemoptysis  Cardiovascular: Denies chest pain, denies palpitations,+DIA, No Chest Pressure  Gastrointestinal:  No Abdominal Pain, No Nausea, No Vomiting, No Diarrhea  Genitourinary:  No Dysuria, No Frequency, No Hesitancy  Musculoskeletal: No muscle pain or swelling  Endocrine:  No Heat Intolerance, No Cold Intolerance  Hematologic:  No Bleeding, No Bruising  Psychiatric:  No Anxiety, No Depression  Neurologic:  No Confusion, no Dysarthria, No Headaches  Skin:  No Rash, No Open Wounds          Objective   Objective     Vitals:   Temp:  [98.9 °F (37.2 °C)-103 °F (39.4 °C)] 100 °F (37.8 °C)  Heart Rate:  [] 101  Resp:  [20] 20  BP: (122-143)/(59-71) 125/69  Physical Exam   Vital Signs Reviewed   Thin built male, in no acute distress, has coughing fits at times  HEENT:  PERRL, EOMI.  OP, nares clear, no sinus tenderness  Neck:  Supple, no JVD, no thyromegaly  Lymph: no axillary, cervical, supraclavicular lymphadenopathy noted bilaterally  Chest:   Bilateral diminished breath sounds, crackles bilateral rhonchi, no wheezing, crackles, resonant percussion bilaterally  CV: RRR, no MGR, pulses 2+, equal.  Abd:  Soft, NT, ND, + BS, no HSM, previous PEG and  surgical scar+  EXT:  no clubbing, no cyanosis, no edema, no joint tenderness  Neuro:  A&Ox3, CN grossly intact, no focal deficits.  Skin: No rashes or lesions noted         Result Review    Result Review:  I have personally reviewed the results from the time of this admission to 11/10/2022 08:23 EST and agree with these findings:  [x]  Laboratory  [x]  Microbiology  [x]  Radiology  [x]  EKG/Telemetry   [x]  Cardiology/Vascular   []  Pathology  []  Old records  []  Other:  Most notable findings include:         Lab 11/10/22  0922 11/10/22  0618 11/09/22  0519 11/08/22  1523   WBC 16.70*  --  18.77* 24.31*   HEMOGLOBIN 12.2*  --  11.7* 12.6*   HEMATOCRIT 36.7*  --  35.1* 38.9   PLATELETS 576*  --  540* 659*   SODIUM  --  133* 131* 133*   POTASSIUM  --  4.4 4.0 5.0   CHLORIDE  --  96* 96* 95*   CO2  --  25.6 25.1 26.9   BUN  --  13 14 14   CREATININE  --  0.65*  0.65* 0.64* 0.80   GLUCOSE  --  121* 146* 217*   CALCIUM  --  8.4* 8.6 9.2   TOTAL PROTEIN  --   --   --  7.6   ALBUMIN  --   --   --  3.00*   GLOBULIN  --   --   --  4.6     Respiratory cultures growing haemophilus and Flaget      Assessment & Plan   Assessment / Plan     Active Hospital Problems:  Active Hospital Problems    Diagnosis    • **Multifocal pneumonia    • Bronchitis    • Cystic fibrosis with pulmonary manifestations (HCC)          Impression:     Cystic fibrosis with acute exacerbation  Bronchiectasis with difficulty with airway clearance  History of chronic constipation  Pancreatic insufficiency  Noncompliance with medications  Recurrent sinusitis    Plan:   Continue with Brovana, Pulmicort and Yupelri.    Bronchopulmonary hygiene protocol.  Continue with IV cefepime, start linezolid twice daily with history of MRSA infection past.  Haemophilus would be covered by cephalosporin.  Can  de-escalate to Augmentin.  Continue with Creon's 3 times a day.  Chest vest 4 times daily.  Patient noncompliant with airway clearance therapies at  home.  Discussed bronchoscopy with bronchoalveolar lavage, bronchial washing, possible transbronchial biopsy, endobronchial brushing, airway inspection.  Risks and benefits of the procedure were discussed in detail.  Alternatives and options were reviewed.  Risks including pneumothorax, pneumonia, bleeding, respiratory depression and that it could be fatal were all reviewed.  Patient understands and is agreeable for the procedure.  Proceed with bronchoscopy today  Check a sputum culture.  Continue with Creon 3 times daily.  Vitamin ADEK.   Start laxatives.        DVT prophylaxis:  Mechanical DVT prophylaxis orders are present.    CODE STATUS:   Code Status (Patient has no pulse and is not breathing): CPR (Attempt to Resuscitate)  Medical Interventions (Patient has pulse or is breathing): Full Support      I personally reviewed pertinent labs, imaging and provider notes. Discussed with bedside nurse and will discuss with primary service.       Electronically signed by Bright Worley MD, 11/10/2022, 08:23 EST.             Electronically signed by Bright Worley MD at 11/10/22 1106     Silvano Whipple MD at 22 1458           [facility]   Hospitalist Progress Note  Date: 2022  Patient Name: Marcus Jensen  : 1995  MRN: [id number]  Date of admission: 2022      Subjective   Subjective     Chief Complaint:     Summary: 27 y.o. male with history of cystic fibrosis since childhood, supposed to be on chest vest and nebulizer treatment at home, noncompliance with medications, supposed to be on pancreatic enzymes at home but not taking it regularly.  He also has history of traumatic brain injury when younger.  He was admitted to hospital a month ago with MRSA pneumonia with worsening respiratory status, was discharged home on doxycycline.  He presented to the hospital with fever and worsening shortness of breath.  He has been coughing up thick mucoid phlegm.  In the emergency room, he was  febrile up to 101 °F, tachycardic to 120s, had minimal oxygen desaturation.  Chest x-ray showed multifocal bilateral upper lobe infiltrate.    Interval Followup: No acute events overnight.  He was febrile overnight to one 1.6 and up to 103 this morning.  He essentially tells me he is feeling all right today except for his cough.  Coughing up thick yellow to white mucus.  His only complaint is feeling tired today.    Review of Systems   All systems reviewed and negative unless otherwise stated under interval follow-up    Objective   Objective     Vitals:   Temp:  [98.6 °F (37 °C)-103 °F (39.4 °C)] 103 °F (39.4 °C)  Heart Rate:  [106-141] 137  Resp:  [17-24] 20  BP: (112-144)/(61-85) 130/69  Flow (L/min):  [2] 2  Physical Exam    Constitutional: Awake, alert, no acute distress but coughs during exam   Eyes: Pupils equal, sclerae anicteric, no conjunctival injection   HENT: NCAT, mucous membranes moist   Neck: Supple, no thyromegaly, supraclavicular ymphadenopathy noted, trachea midline   Respiratory: Diminished breath sounds noted throughout, scattered rhonchi, no wheezing or crackles, nasal cannula in place   Cardiovascular: Tachycardic but regular, no murmurs, rubs, or gallops   Gastrointestinal: Positive bowel sounds, soft, nontender, nondistended   Musculoskeletal: No bilateral ankle edema, no clubbing or cyanosis to extremities   Psychiatric: Flat affect, cooperative   Neurologic: Oriented x 3, strength symmetric in all extremities, Cranial Nerves grossly intact to confrontation, speech clear   Skin: No rashes     Result Review    Result Review:  I have personally reviewed the results from the time of this admission to 11/9/2022 14:58 EST and agree with these findings:  [x]  Laboratory all labs reviewed  [x]  Microbiology MRSA nares negative, strep culture growing haemophilus influenza  Influenza and COVID swabs negative  []  Radiology  [x]  EKG/Telemetry telemetry reviewed showing sinus tachycardia  []   Cardiology/Vascular   []  Pathology  []  Old records  []  Other:  CBC    CBC 10/3/22 11/8/22 11/9/22   WBC 15.95 (A) 24.31 (A) 18.77 (A)   RBC 4.25 4.67 4.26   Hemoglobin 11.8 (A) 12.6 (A) 11.7 (A)   Hematocrit 35.1 (A) 38.9 35.1 (A)   MCV 82.6 83.3 82.4   MCH 27.8 27.0 27.5   MCHC 33.6 32.4 33.3   RDW 13.8 14.9 14.8   Platelets 509 (A) 659 (A) 540 (A)   (A) Abnormal value            CMP    CMP 10/3/22 11/8/22 11/9/22   Glucose 103 (A) 217 (A) 146 (A)   BUN 10 14 14   Creatinine 0.67 (A) 0.80 0.64 (A)   Sodium 132 (A) 133 (A) 131 (A)   Potassium 4.2 5.0 4.0   Chloride 99 95 (A) 96 (A)   Calcium 8.4 (A) 9.2 8.6   Albumin  3.00 (A)    Total Bilirubin  0.2    Alkaline Phosphatase  160 (A)    AST (SGOT)  17    ALT (SGPT)  19    (A) Abnormal value              Assessment & Plan   Assessment / Plan     Assessment/Plan:  Multifocal pneumonia due haemophilus influenza  Bronchiectasis/issues with airway clearance  Cystic fibrosis with acute exacerbation  Pancreatic insufficiency  History of chronic constipation  Recurrent sinusitis     Continue to monitor in the hospital for management of the above  Consult pulmonology, appreciate assistance  Continue with IV cefepime.  Thus far, MRSA nares negative, sputum culture growing gram-negative  Continue scheduled Pulmicort, Brovana, Yupelri, DuoNebs every 4 hours.  Bronchopulmonary hygiene  Chest vest 4 times daily, patient does not apparently use his chest vest at home as prescribed  Plan for bronchoscopy today per pulmonology  Sputum culture pending  Continue Creon 3 times a day  Start stool softeners  Continue with IV fluids  Continue appropriate home medications  Trend renal function and electrolytes with a.m. BMP  Trend Hgb and WBC with a.m. CBC    Discussed plan with RN, pulmonology    DVT prophylaxis:  Mechanical DVT prophylaxis orders are present.    CODE STATUS:   Code Status (Patient has no pulse and is not breathing): CPR (Attempt to Resuscitate)  Medical Interventions  (Patient has pulse or is breathing): Full Support      Electronically signed by Silvano Weir MD, 11/09/22, 2:58 PM EST.             Electronically signed by Silvano Whipple MD at 11/09/22 1517       Consult Notes (last 24 hours)  Notes from 11/09/22 1433 through 11/10/22 1433   No notes of this type exist for this encounter.

## 2022-11-10 NOTE — PLAN OF CARE
Goal Outcome Evaluation:              Outcome Evaluation: AOx4. Bronchoscopy performed this shift. Continues to be tachy in the 130s and running a fever, MD aware of this. Tylenol given per mar. Regular diet resumed. Up ad griselda.

## 2022-11-10 NOTE — ANESTHESIA PREPROCEDURE EVALUATION
Anesthesia Evaluation     Patient summary reviewed and Nursing notes reviewed   no history of anesthetic complications:  NPO Solid Status: > 8 hours  NPO Liquid Status: > 2 hours           Airway   Mallampati: II  TM distance: >3 FB  Neck ROM: full  No difficulty expected  Dental      Pulmonary - negative pulmonary ROS and normal exam    breath sounds clear to auscultation  Cardiovascular - negative cardio ROS and normal exam  Exercise tolerance: good (4-7 METS)    Rhythm: regular  Rate: normal        Neuro/Psych- negative ROS  GI/Hepatic/Renal/Endo - negative ROS     Musculoskeletal (-) negative ROS    Abdominal    Substance History - negative use     OB/GYN negative ob/gyn ROS         Other - negative ROS       ROS/Med Hx Other: PAT Nursing Notes unavailable.                   Anesthesia Plan    ASA 3     general       Anesthetic plan, risks, benefits, and alternatives have been provided, discussed and informed consent has been obtained with: patient.        CODE STATUS:    Code Status (Patient has no pulse and is not breathing): CPR (Attempt to Resuscitate)  Medical Interventions (Patient has pulse or is breathing): Full Support

## 2022-11-10 NOTE — PROGRESS NOTES
Pulmonary / Critical Care Progress Note      Patient Name: Marcus Jensen  : 1995  MRN: [id number]  Primary Care Physician:  Basilia Church APRN  Date of admission: 2022    Subjective   Subjective   Follow-up for cystic fibrosis with acute exacerbation, bronchitis, pneumonia with haemophilus.     Over past 24 hours:  Remained on IV cefepime.  Remained on nebulizers including Brovana, Pulmicort and DuoNeb.  Started on vitamins and Creon.     No acute events overnight.     This morning,   Continues to have cough.  Not able to produce much phlegm.  Has no chest pain or chest tightness.  No nausea or vomiting.  Had fever up to 103 °F overnight.  Still has some chills.  NPO for the procedure.       Review of Systems  General:  Fatigue, Fever, chills+  HEENT: No dysphagia, No Visual Changes, no rhinorrhea  Respiratory:  + cough,+Dyspnea, intermittent phlegm, No Pleuritic Pain, + wheezing, no hemoptysis  Cardiovascular: Denies chest pain, denies palpitations,+DIA, No Chest Pressure  Gastrointestinal:  No Abdominal Pain, No Nausea, No Vomiting, No Diarrhea  Genitourinary:  No Dysuria, No Frequency, No Hesitancy  Musculoskeletal: No muscle pain or swelling  Endocrine:  No Heat Intolerance, No Cold Intolerance  Hematologic:  No Bleeding, No Bruising  Psychiatric:  No Anxiety, No Depression  Neurologic:  No Confusion, no Dysarthria, No Headaches  Skin:  No Rash, No Open Wounds          Objective   Objective     Vitals:   Temp:  [98.9 °F (37.2 °C)-103 °F (39.4 °C)] 100 °F (37.8 °C)  Heart Rate:  [] 101  Resp:  [20] 20  BP: (122-143)/(59-71) 125/69  Physical Exam   Vital Signs Reviewed   Thin built male, in no acute distress, has coughing fits at times  HEENT:  PERRL, EOMI.  OP, nares clear, no sinus tenderness  Neck:  Supple, no JVD, no thyromegaly  Lymph: no axillary, cervical, supraclavicular lymphadenopathy noted bilaterally  Chest:   Bilateral diminished breath sounds, crackles bilateral rhonchi, no  wheezing, crackles, resonant percussion bilaterally  CV: RRR, no MGR, pulses 2+, equal.  Abd:  Soft, NT, ND, + BS, no HSM, previous PEG and surgical scar+  EXT:  no clubbing, no cyanosis, no edema, no joint tenderness  Neuro:  A&Ox3, CN grossly intact, no focal deficits.  Skin: No rashes or lesions noted         Result Review    Result Review:  I have personally reviewed the results from the time of this admission to 11/10/2022 08:23 EST and agree with these findings:  [x]  Laboratory  [x]  Microbiology  [x]  Radiology  [x]  EKG/Telemetry   [x]  Cardiology/Vascular   []  Pathology  []  Old records  []  Other:  Most notable findings include:         Lab 11/10/22  0922 11/10/22  0618 11/09/22  0519 11/08/22  1523   WBC 16.70*  --  18.77* 24.31*   HEMOGLOBIN 12.2*  --  11.7* 12.6*   HEMATOCRIT 36.7*  --  35.1* 38.9   PLATELETS 576*  --  540* 659*   SODIUM  --  133* 131* 133*   POTASSIUM  --  4.4 4.0 5.0   CHLORIDE  --  96* 96* 95*   CO2  --  25.6 25.1 26.9   BUN  --  13 14 14   CREATININE  --  0.65*  0.65* 0.64* 0.80   GLUCOSE  --  121* 146* 217*   CALCIUM  --  8.4* 8.6 9.2   TOTAL PROTEIN  --   --   --  7.6   ALBUMIN  --   --   --  3.00*   GLOBULIN  --   --   --  4.6     Respiratory cultures growing haemophilus and Influenza A.       Assessment & Plan   Assessment / Plan     Active Hospital Problems:  Active Hospital Problems    Diagnosis    • **Multifocal pneumonia    • Bronchitis    • Cystic fibrosis with pulmonary manifestations (HCC)          Impression:     Cystic fibrosis with acute exacerbation  Bronchiectasis with difficulty with airway clearance  History of chronic constipation  Pancreatic insufficiency  Noncompliance with medications  Recurrent sinusitis    Plan:   Continue with Brovana, Pulmicort and Yupelri.    Bronchopulmonary hygiene protocol.  Continue with IV cefepime, start linezolid twice daily with history of MRSA infection past.  Haemophilus would be covered by cephalosporin.  Can  de-escalate to  Augmentin.  Continue with Creon's 3 times a day.  Chest vest 4 times daily.  Patient noncompliant with airway clearance therapies at home.  Discussed bronchoscopy with bronchoalveolar lavage, bronchial washing, possible transbronchial biopsy, endobronchial brushing, airway inspection.  Risks and benefits of the procedure were discussed in detail.  Alternatives and options were reviewed.  Risks including pneumothorax, pneumonia, bleeding, respiratory depression and that it could be fatal were all reviewed.  Patient understands and is agreeable for the procedure.  Proceed with bronchoscopy today  Check a sputum culture.  Continue with Creon 3 times daily.  Vitamin ADEK.   Start laxatives.        DVT prophylaxis:  Mechanical DVT prophylaxis orders are present.    CODE STATUS:   Code Status (Patient has no pulse and is not breathing): CPR (Attempt to Resuscitate)  Medical Interventions (Patient has pulse or is breathing): Full Support      I personally reviewed pertinent labs, imaging and provider notes. Discussed with bedside nurse and will discuss with primary service.       Electronically signed by Bright Worley MD, 11/10/2022, 08:23 EST.

## 2022-11-10 NOTE — PROGRESS NOTES
[facility]   Hospitalist Progress Note  Date: 11/10/2022  Patient Name: Marcus Jensen  : 1995  MRN: [id number]  Date of admission: 2022      Subjective   Subjective     Chief Complaint:     Summary: 27 y.o. male with history of cystic fibrosis since childhood, supposed to be on chest vest and nebulizer treatment at home, noncompliance with medications, supposed to be on pancreatic enzymes at home but not taking it regularly.  He also has history of traumatic brain injury when younger.  He was admitted to hospital a month ago with MRSA pneumonia with worsening respiratory status, was discharged home on doxycycline.  He presented to the hospital with fever and worsening shortness of breath.  He has been coughing up thick mucoid phlegm.  In the emergency room, he was febrile up to 101 °F, tachycardic to 120s, had minimal oxygen desaturation.  Chest x-ray showed multifocal bilateral upper lobe infiltrate.  He was admitted for further care, pulmonology was consulted.  Initially started on broad-spectrum antibiotics, sputum culture grew haemophilus influenza.  He underwent bronchoscopy on 11/10.    Interval Followup: No acute events overnight.  Fevers are improving.  Heart rate is improving.  States he has continued to have productive cough and coughed up sputum all night long.  Did not sleep well.  Breathing feels about the same.  He was seen after bronchoscopy and states he feels a little better after the procedure.    Review of Systems   All systems reviewed and negative unless otherwise stated under interval follow-up    Objective   Objective     Vitals:   Temp:  [98.2 °F (36.8 °C)-102.9 °F (39.4 °C)] 98.2 °F (36.8 °C)  Heart Rate:  [] 93  Resp:  [9-30] 20  BP: ()/(54-95) 101/54  Flow (L/min):  [2-4] 2  Physical Exam    Constitutional: Awake, alert, no acute distress, appears more comfortable   Eyes: Pupils equal, sclerae anicteric, no conjunctival injection   HENT: NCAT, mucous  membranes moist   Neck: Supple, no thyromegaly, trachea midline   Respiratory: Improved aeration, scattered rhonchi, nasal cannula in place   Cardiovascular: Tachycardic but regular, no murmurs, rubs, or gallops   Gastrointestinal: Positive bowel sounds, soft, nontender, nondistended   Musculoskeletal: No bilateral ankle edema, no clubbing or cyanosis to extremities   Psychiatric: Flat affect, cooperative   Neurologic: Oriented x 3, strength symmetric in all extremities, Cranial Nerves grossly intact to confrontation, speech clear   Skin: No rashes     Result Review    Result Review:  I have personally reviewed the results from the time of this admission to 11/10/2022 15:09 EST and agree with these findings:  [x]  Laboratory all labs reviewed  [x]  Microbiology MRSA nares negative, strep culture growing haemophilus influenza  Influenza and COVID swabs negative  []  Radiology  [x]  EKG/Telemetry telemetry reviewed showing sinus tachycardia  []  Cardiology/Vascular   []  Pathology  []  Old records  []  Other:  CBC    CBC 11/8/22 11/9/22 11/10/22   WBC 24.31 (A) 18.77 (A) 16.70 (A)   RBC 4.67 4.26 4.47   Hemoglobin 12.6 (A) 11.7 (A) 12.2 (A)   Hematocrit 38.9 35.1 (A) 36.7 (A)   MCV 83.3 82.4 82.1   MCH 27.0 27.5 27.3   MCHC 32.4 33.3 33.2   RDW 14.9 14.8 14.6   Platelets 659 (A) 540 (A) 576 (A)   (A) Abnormal value            CMP    CMP 11/8/22 11/9/22 11/10/22 11/10/22      0618 0618   Glucose 217 (A) 146 (A)  121 (A)   BUN 14 14  13   Creatinine 0.80 0.64 (A) 0.65 (A) 0.65 (A)   Sodium 133 (A) 131 (A)  133 (A)   Potassium 5.0 4.0  4.4   Chloride 95 (A) 96 (A)  96 (A)   Calcium 9.2 8.6  8.4 (A)   Albumin 3.00 (A)      Total Bilirubin 0.2      Alkaline Phosphatase 160 (A)      AST (SGOT) 17      ALT (SGPT) 19      (A) Abnormal value              Assessment & Plan   Assessment / Plan     Assessment/Plan:  Multifocal pneumonia due haemophilus influenza  Bronchiectasis/issues with airway clearance  Cystic fibrosis with  acute exacerbation  Pancreatic insufficiency  History of chronic constipation  Recurrent sinusitis     Continue to monitor in the hospital for management of the above  Pulmonology following  Proctoscopy performed today, initial cultures growing staph RS, Haemophilus influenzae, influenza A  Continue cefepime, add linezolid  Start Tamiflu for total of 5 days  Continue scheduled Pulmicort, Yina Beyer, Rob every 4 hours.  Bronchopulmonary hygiene  Chest vest 4 times daily, patient does not apparently use his chest vest at home as prescribed  Continue Creon 3 times a day  Stool softeners  Continue appropriate home medications  Trend renal function and electrolytes with a.m. BMP  Trend Hgb and WBC with a.m. CBC    Discussed plan with RN, pulmonology    DVT prophylaxis:  Mechanical DVT prophylaxis orders are present.    CODE STATUS:   Code Status (Patient has no pulse and is not breathing): CPR (Attempt to Resuscitate)  Medical Interventions (Patient has pulse or is breathing): Full Support

## 2022-11-10 NOTE — ANESTHESIA POSTPROCEDURE EVALUATION
Patient: Marcus Jensen    Procedure Summary     Date: 11/10/22 Room / Location: [facility] ENDOSCOPY 3 / [facility]    Anesthesia Start: 1117 Anesthesia Stop: 1145    Procedure: BRONCHOSCOPY WITH BRONCHOALVEOLAR LAVAGE, BRONCHIAL WASHINGS, AIRWAY INSPECTION (Bronchus) Diagnosis:       Multifocal pneumonia      Cystic fibrosis (HCC)      Bronchitis      (Multifocal pneumonia [J18.9])      (Cystic fibrosis (HCC) [E84.9])      (Bronchitis [J40])    Surgeons: Bright Worley MD Provider: Bala Darby MD    Anesthesia Type: general ASA Status: 3          Anesthesia Type: general    Vitals  Vitals Value Taken Time   BP 98/65 11/10/22 1203   Temp 36.8 °C (98.3 °F) 11/10/22 1203   Pulse 103 11/10/22 1205   Resp 25 11/10/22 1203   SpO2 91 % 11/10/22 1205   Vitals shown include unvalidated device data.        Post Anesthesia Care and Evaluation    Patient location during evaluation: bedside  Patient participation: complete - patient participated  Level of consciousness: awake  Pain management: adequate    Airway patency: patent  Anesthetic complications: No anesthetic complications  PONV Status: none  Cardiovascular status: acceptable and stable  Respiratory status: acceptable  Hydration status: acceptable    Comments: An Anesthesiologist personally participated in the most demanding procedures (including induction and emergence if applicable) in the anesthesia plan, monitored the course of anesthesia administration at frequent intervals and remained physically present and available for immediate diagnosis and treatment of emergencies.

## 2022-11-10 NOTE — OP NOTE
Bronchoscopy Procedure Note    Procedure:  Bronchoscopy with mucous plug removal  Bronchoscopy with bronchoalveolar lavage right lower lobe  Bronchoscopy with bronchial washings tracheobronchial tree  Airway inspection    Pre-Operative Diagnosis: Bronchiectasis with acute exacerbation, recurrent pneumonia  Post-Operative Diagnosis: Mucous plugging, bronchiectasis with persistent bronchitis    Indication:  Bronchiectasis with acute exacerbation, recurrent pneumonia    Anesthesia: MAC anesthesia    Procedure Details: Patient was consented for the procedure with all risks and benefits of the procedure explained in detail. Patient was given the opportunity to ask questions and all concerns were answered.    The bronchoscope was inserted into the main airway via the mouth. An anatomical survey was done of the main airways and the subsegmental bronchus. The findings are reported below.  Scattered whitish patch on the trachea was seen, concerning for fungal bronchitis.  Bilateral significant mucus plugging were seen, most so in left and right lower lobes.  It was suctioned out in several attempts.  A bronchoalveolar lavage was performed using 60 mL aliquots of normal saline x2 instilled into the airways then aspirated back from right lower lobe of lung with 45 mL serosanguineous fluid in return.  Bronchial washing was obtained from entire tracheobronchial tree.. Patient had minimal oozing after the procedure.    Findings:  Scattered whitish patch in trachea  Bilateral mucous plugging lower lobes  Friable mucosa, easy bleeding with suction  Scattered purulent secretions    Estimated Blood Loss: Insignificant    Specimens:  BAL right lower lobe  Bronchial washings tracheobronchial tree    Complications: None; patient tolerated the procedure well.    Disposition: To floor, once stable in recovery    Patient tolerated the procedure well.      Electronically signed by Bright Worley MD, 11/10/2022, 11:36 EST.

## 2022-11-11 LAB
ANION GAP SERPL CALCULATED.3IONS-SCNC: 7.1 MMOL/L (ref 5–15)
BASOPHILS # BLD AUTO: 0.06 10*3/MM3 (ref 0–0.2)
BASOPHILS NFR BLD AUTO: 0.3 % (ref 0–1.5)
BUN SERPL-MCNC: 12 MG/DL (ref 6–20)
BUN/CREAT SERPL: 15.4 (ref 7–25)
CALCIUM SPEC-SCNC: 8.4 MG/DL (ref 8.6–10.5)
CHLORIDE SERPL-SCNC: 94 MMOL/L (ref 98–107)
CO2 SERPL-SCNC: 29.9 MMOL/L (ref 22–29)
CREAT SERPL-MCNC: 0.78 MG/DL (ref 0.76–1.27)
DEPRECATED RDW RBC AUTO: 44.8 FL (ref 37–54)
EGFRCR SERPLBLD CKD-EPI 2021: 126.1 ML/MIN/1.73
EOSINOPHIL # BLD AUTO: 0.07 10*3/MM3 (ref 0–0.4)
EOSINOPHIL NFR BLD AUTO: 0.4 % (ref 0.3–6.2)
ERYTHROCYTE [DISTWIDTH] IN BLOOD BY AUTOMATED COUNT: 14.9 % (ref 12.3–15.4)
GLUCOSE SERPL-MCNC: 123 MG/DL (ref 65–99)
HCT VFR BLD AUTO: 35.8 % (ref 37.5–51)
HGB BLD-MCNC: 11.6 G/DL (ref 13–17.7)
IMM GRANULOCYTES # BLD AUTO: 0.14 10*3/MM3 (ref 0–0.05)
IMM GRANULOCYTES NFR BLD AUTO: 0.7 % (ref 0–0.5)
LYMPHOCYTES # BLD AUTO: 2.38 10*3/MM3 (ref 0.7–3.1)
LYMPHOCYTES NFR BLD AUTO: 12 % (ref 19.6–45.3)
MAGNESIUM SERPL-MCNC: 2.1 MG/DL (ref 1.6–2.6)
MCH RBC QN AUTO: 27 PG (ref 26.6–33)
MCHC RBC AUTO-ENTMCNC: 32.4 G/DL (ref 31.5–35.7)
MCV RBC AUTO: 83.3 FL (ref 79–97)
MONOCYTES # BLD AUTO: 0.88 10*3/MM3 (ref 0.1–0.9)
MONOCYTES NFR BLD AUTO: 4.4 % (ref 5–12)
NEUTROPHILS NFR BLD AUTO: 16.38 10*3/MM3 (ref 1.7–7)
NEUTROPHILS NFR BLD AUTO: 82.2 % (ref 42.7–76)
NRBC BLD AUTO-RTO: 0 /100 WBC (ref 0–0.2)
PHOSPHATE SERPL-MCNC: 3.5 MG/DL (ref 2.5–4.5)
PLATELET # BLD AUTO: 575 10*3/MM3 (ref 140–450)
PMV BLD AUTO: 9.2 FL (ref 6–12)
POTASSIUM SERPL-SCNC: 4.5 MMOL/L (ref 3.5–5.2)
RBC # BLD AUTO: 4.3 10*6/MM3 (ref 4.14–5.8)
SODIUM SERPL-SCNC: 131 MMOL/L (ref 136–145)
WBC NRBC COR # BLD: 19.91 10*3/MM3 (ref 3.4–10.8)

## 2022-11-11 PROCEDURE — 99233 SBSQ HOSP IP/OBS HIGH 50: CPT | Performed by: INTERNAL MEDICINE

## 2022-11-11 PROCEDURE — 84100 ASSAY OF PHOSPHORUS: CPT | Performed by: INTERNAL MEDICINE

## 2022-11-11 PROCEDURE — 63710000001 REVEFENACIN 175 MCG/3ML SOLUTION: Performed by: INTERNAL MEDICINE

## 2022-11-11 PROCEDURE — 94799 UNLISTED PULMONARY SVC/PX: CPT

## 2022-11-11 PROCEDURE — 80048 BASIC METABOLIC PNL TOTAL CA: CPT | Performed by: INTERNAL MEDICINE

## 2022-11-11 PROCEDURE — 85025 COMPLETE CBC W/AUTO DIFF WBC: CPT | Performed by: INTERNAL MEDICINE

## 2022-11-11 PROCEDURE — 83735 ASSAY OF MAGNESIUM: CPT | Performed by: INTERNAL MEDICINE

## 2022-11-11 RX ORDER — IPRATROPIUM BROMIDE AND ALBUTEROL SULFATE 2.5; .5 MG/3ML; MG/3ML
3 SOLUTION RESPIRATORY (INHALATION) EVERY 6 HOURS PRN
Status: DISCONTINUED | OUTPATIENT
Start: 2022-11-11 | End: 2022-11-12 | Stop reason: HOSPADM

## 2022-11-11 RX ADMIN — BUDESONIDE 0.5 MG: 0.5 INHALANT ORAL at 18:20

## 2022-11-11 RX ADMIN — Medication 1000 UNITS: at 09:16

## 2022-11-11 RX ADMIN — PANCRELIPASE 6000 UNITS OF LIPASE: 30000; 6000; 19000 CAPSULE, DELAYED RELEASE PELLETS ORAL at 09:16

## 2022-11-11 RX ADMIN — POLYETHYLENE GLYCOL (3350) 17 G: 17 POWDER, FOR SOLUTION ORAL at 09:17

## 2022-11-11 RX ADMIN — IPRATROPIUM BROMIDE AND ALBUTEROL SULFATE 3 ML: 2.5; .5 SOLUTION RESPIRATORY (INHALATION) at 07:14

## 2022-11-11 RX ADMIN — PANCRELIPASE 6000 UNITS OF LIPASE: 30000; 6000; 19000 CAPSULE, DELAYED RELEASE PELLETS ORAL at 18:07

## 2022-11-11 RX ADMIN — Medication 10 ML: at 09:17

## 2022-11-11 RX ADMIN — LINEZOLID 600 MG: 600 TABLET, FILM COATED ORAL at 20:40

## 2022-11-11 RX ADMIN — Medication 10 ML: at 20:39

## 2022-11-11 RX ADMIN — ARFORMOTEROL TARTRATE 15 MCG: 15 SOLUTION RESPIRATORY (INHALATION) at 07:14

## 2022-11-11 RX ADMIN — BUDESONIDE 0.5 MG: 0.5 INHALANT ORAL at 07:14

## 2022-11-11 RX ADMIN — Medication 400 UNITS: at 09:16

## 2022-11-11 RX ADMIN — SENNOSIDES AND DOCUSATE SODIUM 1 TABLET: 8.6; 5 TABLET ORAL at 09:17

## 2022-11-11 RX ADMIN — REVEFENACIN 175 MCG: 175 SOLUTION RESPIRATORY (INHALATION) at 07:16

## 2022-11-11 RX ADMIN — OSELTAMIVIR PHOSPHATE 75 MG: 75 CAPSULE ORAL at 20:40

## 2022-11-11 RX ADMIN — ARFORMOTEROL TARTRATE 15 MCG: 15 SOLUTION RESPIRATORY (INHALATION) at 18:20

## 2022-11-11 RX ADMIN — PANCRELIPASE 6000 UNITS OF LIPASE: 30000; 6000; 19000 CAPSULE, DELAYED RELEASE PELLETS ORAL at 15:06

## 2022-11-11 RX ADMIN — AMOXICILLIN AND CLAVULANATE POTASSIUM 1 TABLET: 875; 125 TABLET, FILM COATED ORAL at 20:40

## 2022-11-11 RX ADMIN — OSELTAMIVIR PHOSPHATE 75 MG: 75 CAPSULE ORAL at 09:17

## 2022-11-11 RX ADMIN — LINEZOLID 600 MG: 600 TABLET, FILM COATED ORAL at 09:17

## 2022-11-11 RX ADMIN — AMOXICILLIN AND CLAVULANATE POTASSIUM 1 TABLET: 875; 125 TABLET, FILM COATED ORAL at 09:16

## 2022-11-11 NOTE — PLAN OF CARE
Goal Outcome Evaluation:  Plan of Care Reviewed With: patient        Progress: no change  Outcome Evaluation: Pt. currently resting. Pt. continues to have a cough, medicine offered but pt. declined. Pt. continued to have a fever throughout the night that slowly kept coming down. Pt. received PRN tylenol for fever. No new issues/needs noted at this time.

## 2022-11-11 NOTE — PROGRESS NOTES
[facility]   Hospitalist Progress Note  Date: 2022  Patient Name: Marcus Jensen  : 1995  MRN: [id number]  Date of admission: 2022      Subjective   Subjective     Chief Complaint:     Summary: 27 y.o. male with history of cystic fibrosis since childhood, supposed to be on chest vest and nebulizer treatment at home, noncompliance with medications, supposed to be on pancreatic enzymes at home but not taking it regularly.  He also has history of traumatic brain injury when younger.  He was admitted to hospital a month ago with MRSA pneumonia with worsening respiratory status, was discharged home on doxycycline.  He presented to the hospital with fever and worsening shortness of breath.  He has been coughing up thick mucoid phlegm.  In the emergency room, he was febrile up to 101 °F, tachycardic to 120s, had minimal oxygen desaturation.  Chest x-ray showed multifocal bilateral upper lobe infiltrate.  He was admitted for further care, pulmonology was consulted.  Initially started on broad-spectrum antibiotics, sputum culture grew haemophilus influenza.  He underwent bronchoscopy on 11/10 which revealed staph and haemophilus infections as well as influenza A.  Started on linezolid and Tamiflu.    Interval Followup: No acute events overnight.  Still having intermittent fevers but states he is feeling a little better today.  Coughing but amount of phlegm is coughing up is decreasing.  Denies chest pain or palpitations.    Review of Systems   All systems reviewed and negative unless otherwise stated under interval follow-up    Objective   Objective     Vitals:   Temp:  [99.1 °F (37.3 °C)-103.1 °F (39.5 °C)] 99.9 °F (37.7 °C)  Heart Rate:  [] 119  Resp:  [18-20] 20  BP: (100-117)/(53-71) 113/59  Flow (L/min):  [0] 0  Physical Exam    Constitutional: Awake, alert, no acute distress, appears more comfortable   Eyes: Pupils equal, sclerae anicteric, no conjunctival injection   HENT: NCAT, mucous  membranes moist   Neck: Supple, no thyromegaly, trachea midline   Respiratory: Diminished breath sounds, improved scattered rhonchi   Cardiovascular: Tachycardic but regular, no murmurs, rubs, or gallops   Gastrointestinal: Positive bowel sounds, soft, nontender, nondistended   Musculoskeletal: No bilateral ankle edema, no clubbing or cyanosis to extremities   Psychiatric: Flat affect, cooperative   Neurologic: Oriented x 3, strength symmetric in all extremities, Cranial Nerves grossly intact to confrontation, speech clear   Skin: No rashes     Result Review    Result Review:  I have personally reviewed the results from the time of this admission to 11/11/2022 17:18 EST and agree with these findings:  [x]  Laboratory all labs reviewed  []  Microbiology   []  Radiology  [x]  EKG/Telemetry telemetry reviewed showing sinus tachycardia  []  Cardiology/Vascular   []  Pathology  []  Old records  []  Other:  CBC    CBC 11/9/22 11/10/22 11/11/22   WBC 18.77 (A) 16.70 (A) 19.91 (A)   RBC 4.26 4.47 4.30   Hemoglobin 11.7 (A) 12.2 (A) 11.6 (A)   Hematocrit 35.1 (A) 36.7 (A) 35.8 (A)   MCV 82.4 82.1 83.3   MCH 27.5 27.3 27.0   MCHC 33.3 33.2 32.4   RDW 14.8 14.6 14.9   Platelets 540 (A) 576 (A) 575 (A)   (A) Abnormal value            CMP    CMP 11/9/22 11/10/22 11/10/22 11/11/22     0618 0618    Glucose 146 (A)  121 (A) 123 (A)   BUN 14  13 12   Creatinine 0.64 (A) 0.65 (A) 0.65 (A) 0.78   Sodium 131 (A)  133 (A) 131 (A)   Potassium 4.0  4.4 4.5   Chloride 96 (A)  96 (A) 94 (A)   Calcium 8.6  8.4 (A) 8.4 (A)   (A) Abnormal value              Assessment & Plan   Assessment / Plan     Assessment/Plan:  Multifocal pneumonia due haemophilus influenza, staph aureus  Influenza A  Bronchiectasis/issues with airway clearance  Cystic fibrosis with acute exacerbation  Pancreatic insufficiency  History of chronic constipation  Recurrent sinusitis     Continue to monitor in the hospital for management of the above  Pulmonology  following  Bronchoscopy revealed haemophilus influenza, staff aureus, influenza A  Continue Tamiflu for total of 5 days  Transition cefepime to Augmentin, continue linezolid  Continue scheduled Pepito Salazar Yupelri, DuoNebs every 4 hours.  Bronchopulmonary hygiene  Chest vest 4 times daily, patient does not apparently use his chest vest at home as prescribed  Continue Creon 3 times a day  Stool softeners  Continue appropriate home medications  Trend renal function and electrolytes with a.m. BMP  Trend Hgb and WBC with a.m. CBC    Discussed plan with RN, pulmonology    DVT prophylaxis:  Mechanical DVT prophylaxis orders are present.    CODE STATUS:   Code Status (Patient has no pulse and is not breathing): CPR (Attempt to Resuscitate)  Medical Interventions (Patient has pulse or is breathing): Full Support

## 2022-11-11 NOTE — PROGRESS NOTES
Pulmonary / Critical Care Progress Note      Patient Name: Marcus Jensen  : 1995  MRN: [id number]  Primary Care Physician:  Basilia Church APRN  Date of admission: 2022    Subjective   Subjective   Follow-up for cystic fibrosis with acute exacerbation, bronchitis, pneumonia with haemophilus, staff aureus and influenza.     Over past 24 hours:  Had bronchoscopy with mucous plug removal.  Remained on nebulizers including Brovana, Pulmicort and DuoNeb.  Continued on vitamins and Creon.  Switched antibiotics to Augmentin and linezolid.  Also started on Tamiflu twice daily.    No acute events overnight.     This morning,   Continues to have cough.  Not able to produce much phlegm.  Has no chest pain or chest tightness.  No nausea or vomiting.  Still had fever to 103.1 °F over last 24 hours.  Still has some chills.  Coughing fits is improved.        Review of Systems  General:  Fatigue, Fever, chills+  HEENT: No dysphagia, No Visual Changes, no rhinorrhea  Respiratory:  + cough,+Dyspnea, intermittent phlegm, No Pleuritic Pain, + wheezing, no hemoptysis  Cardiovascular: Denies chest pain, denies palpitations,+DIA, No Chest Pressure  Gastrointestinal:  No Abdominal Pain, No Nausea, No Vomiting, No Diarrhea  Genitourinary:  No Dysuria, No Frequency, No Hesitancy  Musculoskeletal: No muscle pain or swelling  Endocrine:  No Heat Intolerance, No Cold Intolerance  Hematologic:  No Bleeding, No Bruising  Neurologic:  No Confusion, no Dysarthria, No Headaches  Skin:  No Rash, No Open Wounds          Objective   Objective     Vitals:   Temp:  [98.2 °F (36.8 °C)-103.1 °F (39.5 °C)] 100.4 °F (38 °C)  Heart Rate:  [] 109  Resp:  [9-30] 18  BP: ()/(53-95) 100/57  Flow (L/min):  [2-4] 2  Physical Exam   Vital Signs Reviewed   Thin built male, in no acute distress, has coughing fits at times  HEENT:  PERRL, EOMI.  OP, nares clear, no sinus tenderness  Neck:  Supple, no JVD, no thyromegaly  Lymph: no axillary,  cervical, supraclavicular lymphadenopathy noted bilaterally  Chest:   Bilateral diminished breath sounds, crackles bilateral rhonchi, no wheezing, crackles, resonant percussion bilaterally  CV: RRR, no MGR, pulses 2+, equal.  Abd:  Soft, NT, ND, + BS, no HSM, previous PEG and surgical scar+  EXT:  no clubbing, no cyanosis, no edema, no joint tenderness  Neuro:  A&Ox3, CN grossly intact, no focal deficits.  Skin: No rashes or lesions noted         Result Review    Result Review:  I have personally reviewed the results from the time of this admission to 11/11/2022 07:58 EST and agree with these findings:  [x]  Laboratory  [x]  Microbiology  [x]  Radiology  [x]  EKG/Telemetry   [x]  Cardiology/Vascular   []  Pathology  []  Old records  []  Other:  Most notable findings include:         Lab 11/11/22  0533 11/10/22  0922 11/10/22  0618 11/09/22  0519 11/08/22  1523   WBC 19.91* 16.70*  --  18.77* 24.31*   HEMOGLOBIN 11.6* 12.2*  --  11.7* 12.6*   HEMATOCRIT 35.8* 36.7*  --  35.1* 38.9   PLATELETS 575* 576*  --  540* 659*   SODIUM 131*  --  133* 131* 133*   POTASSIUM 4.5  --  4.4 4.0 5.0   CHLORIDE 94*  --  96* 96* 95*   CO2 29.9*  --  25.6 25.1 26.9   BUN 12  --  13 14 14   CREATININE 0.78  --  0.65*  0.65* 0.64* 0.80   GLUCOSE 123*  --  121* 146* 217*   CALCIUM 8.4*  --  8.4* 8.6 9.2   PHOSPHORUS 3.5  --   --   --   --    TOTAL PROTEIN  --   --   --   --  7.6   ALBUMIN  --   --   --   --  3.00*   GLOBULIN  --   --   --   --  4.6     Respiratory cultures growing haemophilus and Influenza A      Assessment & Plan   Assessment / Plan     Active Hospital Problems:  Active Hospital Problems    Diagnosis    • **Multifocal pneumonia    • Bronchitis    • Cystic fibrosis with pulmonary manifestations (HCC)          Impression:     Cystic fibrosis with acute exacerbation  Bronchiectasis with difficulty with airway clearance  History of chronic constipation  Pancreatic insufficiency  Noncompliance with medications  Recurrent  sinusitis    Plan:   Continue with Brovana, Pulmicort and Yupelri.    Bronchopulmonary hygiene protocol.  Needs to do aggressive airway clearance therapies at home.  However patient has been noncompliant with his chest vest as well as nebulizer treatments.  Continue with IV cefepime, continue linezolid twice daily with history of MRSA infection past.  Continue with Creon's 3 times a day.  Chest vest 4 times daily.  Patient noncompliant with airway clearance therapies at home.  Status post bronchoscopy, currently shows staff aureus, haemophilus influenza and influenza A on pneumonia panel.  Continue with Augmentin, linezolid and Tamiflu for now.  Check a sputum culture.  Continue with Creon 3 times daily.  Vitamin ADEK.   Laxatives as needed.        DVT prophylaxis:  Mechanical DVT prophylaxis orders are present.    CODE STATUS:   Code Status (Patient has no pulse and is not breathing): CPR (Attempt to Resuscitate)  Medical Interventions (Patient has pulse or is breathing): Full Support      I personally reviewed pertinent labs, imaging and provider notes.   Discussed with bedside nurse and will discuss with primary service.       Electronically signed by Bright Worley MD, 11/11/2022, 07:58 EST.

## 2022-11-12 ENCOUNTER — READMISSION MANAGEMENT (OUTPATIENT)
Dept: CALL CENTER | Facility: HOSPITAL | Age: 27
End: 2022-11-12

## 2022-11-12 VITALS
WEIGHT: 105.82 LBS | DIASTOLIC BLOOD PRESSURE: 64 MMHG | OXYGEN SATURATION: 93 % | TEMPERATURE: 98.8 F | BODY MASS INDEX: 19.98 KG/M2 | RESPIRATION RATE: 16 BRPM | HEART RATE: 102 BPM | HEIGHT: 61 IN | SYSTOLIC BLOOD PRESSURE: 127 MMHG

## 2022-11-12 LAB
ANION GAP SERPL CALCULATED.3IONS-SCNC: 9.4 MMOL/L (ref 5–15)
BASOPHILS # BLD AUTO: 0.03 10*3/MM3 (ref 0–0.2)
BASOPHILS NFR BLD AUTO: 0.3 % (ref 0–1.5)
BUN SERPL-MCNC: 10 MG/DL (ref 6–20)
BUN/CREAT SERPL: 16.7 (ref 7–25)
CALCIUM SPEC-SCNC: 9.1 MG/DL (ref 8.6–10.5)
CHLORIDE SERPL-SCNC: 98 MMOL/L (ref 98–107)
CO2 SERPL-SCNC: 27.6 MMOL/L (ref 22–29)
CREAT SERPL-MCNC: 0.6 MG/DL (ref 0.76–1.27)
DEPRECATED RDW RBC AUTO: 46 FL (ref 37–54)
EGFRCR SERPLBLD CKD-EPI 2021: 136.5 ML/MIN/1.73
EOSINOPHIL # BLD AUTO: 0.06 10*3/MM3 (ref 0–0.4)
EOSINOPHIL NFR BLD AUTO: 0.6 % (ref 0.3–6.2)
ERYTHROCYTE [DISTWIDTH] IN BLOOD BY AUTOMATED COUNT: 15.1 % (ref 12.3–15.4)
GLUCOSE SERPL-MCNC: 105 MG/DL (ref 65–99)
HCT VFR BLD AUTO: 37.8 % (ref 37.5–51)
HGB BLD-MCNC: 12.1 G/DL (ref 13–17.7)
IMM GRANULOCYTES # BLD AUTO: 0.1 10*3/MM3 (ref 0–0.05)
IMM GRANULOCYTES NFR BLD AUTO: 1 % (ref 0–0.5)
LYMPHOCYTES # BLD AUTO: 2.21 10*3/MM3 (ref 0.7–3.1)
LYMPHOCYTES NFR BLD AUTO: 22.3 % (ref 19.6–45.3)
MAGNESIUM SERPL-MCNC: 2.5 MG/DL (ref 1.6–2.6)
MCH RBC QN AUTO: 26.8 PG (ref 26.6–33)
MCHC RBC AUTO-ENTMCNC: 32 G/DL (ref 31.5–35.7)
MCV RBC AUTO: 83.6 FL (ref 79–97)
MONOCYTES # BLD AUTO: 0.5 10*3/MM3 (ref 0.1–0.9)
MONOCYTES NFR BLD AUTO: 5.1 % (ref 5–12)
NEUTROPHILS NFR BLD AUTO: 7 10*3/MM3 (ref 1.7–7)
NEUTROPHILS NFR BLD AUTO: 70.7 % (ref 42.7–76)
NRBC BLD AUTO-RTO: 0 /100 WBC (ref 0–0.2)
PHOSPHATE SERPL-MCNC: 3.8 MG/DL (ref 2.5–4.5)
PLATELET # BLD AUTO: 669 10*3/MM3 (ref 140–450)
PMV BLD AUTO: 9.4 FL (ref 6–12)
POTASSIUM SERPL-SCNC: 4.2 MMOL/L (ref 3.5–5.2)
RBC # BLD AUTO: 4.52 10*6/MM3 (ref 4.14–5.8)
SODIUM SERPL-SCNC: 135 MMOL/L (ref 136–145)
WBC NRBC COR # BLD: 9.9 10*3/MM3 (ref 3.4–10.8)

## 2022-11-12 PROCEDURE — 83735 ASSAY OF MAGNESIUM: CPT | Performed by: INTERNAL MEDICINE

## 2022-11-12 PROCEDURE — 85025 COMPLETE CBC W/AUTO DIFF WBC: CPT | Performed by: INTERNAL MEDICINE

## 2022-11-12 PROCEDURE — 94760 N-INVAS EAR/PLS OXIMETRY 1: CPT

## 2022-11-12 PROCEDURE — 94799 UNLISTED PULMONARY SVC/PX: CPT

## 2022-11-12 PROCEDURE — 63710000001 REVEFENACIN 175 MCG/3ML SOLUTION: Performed by: INTERNAL MEDICINE

## 2022-11-12 PROCEDURE — 80048 BASIC METABOLIC PNL TOTAL CA: CPT | Performed by: INTERNAL MEDICINE

## 2022-11-12 PROCEDURE — 99239 HOSP IP/OBS DSCHRG MGMT >30: CPT | Performed by: INTERNAL MEDICINE

## 2022-11-12 PROCEDURE — 82785 ASSAY OF IGE: CPT | Performed by: INTERNAL MEDICINE

## 2022-11-12 PROCEDURE — 84100 ASSAY OF PHOSPHORUS: CPT | Performed by: INTERNAL MEDICINE

## 2022-11-12 PROCEDURE — 99232 SBSQ HOSP IP/OBS MODERATE 35: CPT | Performed by: INTERNAL MEDICINE

## 2022-11-12 RX ORDER — OSELTAMIVIR PHOSPHATE 75 MG/1
75 CAPSULE ORAL EVERY 12 HOURS SCHEDULED
Qty: 6 CAPSULE | Refills: 0 | Status: SHIPPED | OUTPATIENT
Start: 2022-11-12 | End: 2022-11-15

## 2022-11-12 RX ORDER — AMOXICILLIN 250 MG
1 CAPSULE ORAL DAILY
Qty: 30 TABLET | Refills: 0 | Status: SHIPPED | OUTPATIENT
Start: 2022-11-12

## 2022-11-12 RX ORDER — MELATONIN
1000 DAILY
Qty: 30 TABLET | Refills: 0 | Status: SHIPPED | OUTPATIENT
Start: 2022-11-13

## 2022-11-12 RX ORDER — AMOXICILLIN AND CLAVULANATE POTASSIUM 875; 125 MG/1; MG/1
1 TABLET, FILM COATED ORAL EVERY 12 HOURS SCHEDULED
Qty: 12 TABLET | Refills: 0 | Status: SHIPPED | OUTPATIENT
Start: 2022-11-12 | End: 2022-11-18

## 2022-11-12 RX ADMIN — POLYETHYLENE GLYCOL (3350) 17 G: 17 POWDER, FOR SOLUTION ORAL at 09:56

## 2022-11-12 RX ADMIN — PANCRELIPASE 6000 UNITS OF LIPASE: 30000; 6000; 19000 CAPSULE, DELAYED RELEASE PELLETS ORAL at 09:56

## 2022-11-12 RX ADMIN — REVEFENACIN 175 MCG: 175 SOLUTION RESPIRATORY (INHALATION) at 07:44

## 2022-11-12 RX ADMIN — SENNOSIDES AND DOCUSATE SODIUM 1 TABLET: 8.6; 5 TABLET ORAL at 09:56

## 2022-11-12 RX ADMIN — GUAIFENESIN AND CODEINE PHOSPHATE 5 ML: 100; 10 SOLUTION ORAL at 09:59

## 2022-11-12 RX ADMIN — LINEZOLID 600 MG: 600 TABLET, FILM COATED ORAL at 09:56

## 2022-11-12 RX ADMIN — OSELTAMIVIR PHOSPHATE 75 MG: 75 CAPSULE ORAL at 09:56

## 2022-11-12 RX ADMIN — ARFORMOTEROL TARTRATE 15 MCG: 15 SOLUTION RESPIRATORY (INHALATION) at 07:44

## 2022-11-12 RX ADMIN — Medication 400 UNITS: at 09:56

## 2022-11-12 RX ADMIN — PANCRELIPASE 6000 UNITS OF LIPASE: 30000; 6000; 19000 CAPSULE, DELAYED RELEASE PELLETS ORAL at 13:12

## 2022-11-12 RX ADMIN — AMOXICILLIN AND CLAVULANATE POTASSIUM 1 TABLET: 875; 125 TABLET, FILM COATED ORAL at 09:56

## 2022-11-12 RX ADMIN — BUDESONIDE 0.5 MG: 0.5 INHALANT ORAL at 07:44

## 2022-11-12 RX ADMIN — Medication 1000 UNITS: at 09:56

## 2022-11-12 RX ADMIN — Medication 10 ML: at 09:56

## 2022-11-12 NOTE — PROGRESS NOTES
Pulmonary / Critical Care Progress Note      Patient Name: Marcus Jensen  : 1995  MRN: [id number]  Primary Care Physician:  Basilia Church APRN  Date of admission: 2022    Subjective   Subjective   Follow-up for cystic fibrosis with acute exacerbation, bronchitis, pneumonia with haemophilus, staph aureus and influenza.     Over past 24 hours:  On room air  White blood cell count improved  Appears better  Dyspnea better and at baseline  Cough at baseline.  Is able to bring up thick phlegm when using airway clearance  No chest pain or hemoptysis  Fever curve trending down  No nausea, fevers or chills    Review of Systems  Constitutional symptoms:  Denied complaints  Cardiovascular:  Denied complaints  Respiratory: Dyspnea and cough, otherwise denied complaints  Gastrointestinal: Denied complaints        Objective   Objective     Vitals:   Temp:  [97.9 °F (36.6 °C)-100.3 °F (37.9 °C)] 98.1 °F (36.7 °C)  Heart Rate:  [] 107  Resp:  [18-21] 18  BP: (107-119)/(56-71) 119/67     Physical Exam   Vital Signs Reviewed   Thin built male, in no acute distress  HEENT:  PERRL, EOMI.  OP, nares clear, no sinus tenderness  Neck:  Supple, no JVD, no thyromegaly  Chest: Good aeration, rhonchi bilaterally, no wheezing, crackles, resonant percussion bilaterally, no work of breathing noted  CV: RRR, no MGR, pulses 2+, equal.  Abd:  Soft, NT, ND, + BS, no HSM, previous PEG and surgical scar+  EXT:  no clubbing, no cyanosis, no edema, no joint tenderness  Neuro:  A&Ox3, CN grossly intact, no focal deficits.  Skin: No rashes or lesions noted         Result Review    Result Review:  I have personally reviewed the results from the time of this admission to 2022 14:20 EST and agree with these findings:  [x]  Laboratory  [x]  Microbiology  [x]  Radiology  [x]  EKG/Telemetry   [x]  Cardiology/Vascular   []  Pathology  []  Old records  []  Other:  Most notable findings include:         Lab 22  0841  11/11/22  0533 11/10/22  0922 11/10/22  0618 11/09/22  0519 11/08/22  1523   WBC 9.90 19.91* 16.70*  --  18.77* 24.31*   HEMOGLOBIN 12.1* 11.6* 12.2*  --  11.7* 12.6*   HEMATOCRIT 37.8 35.8* 36.7*  --  35.1* 38.9   PLATELETS 669* 575* 576*  --  540* 659*   SODIUM 135* 131*  --  133* 131* 133*   POTASSIUM 4.2 4.5  --  4.4 4.0 5.0   CHLORIDE 98 94*  --  96* 96* 95*   CO2 27.6 29.9*  --  25.6 25.1 26.9   BUN 10 12  --  13 14 14   CREATININE 0.60* 0.78  --  0.65*  0.65* 0.64* 0.80   GLUCOSE 105* 123*  --  121* 146* 217*   CALCIUM 9.1 8.4*  --  8.4* 8.6 9.2   PHOSPHORUS 3.8 3.5  --   --   --   --    TOTAL PROTEIN  --   --   --   --   --  7.6   ALBUMIN  --   --   --   --   --  3.00*   GLOBULIN  --   --   --   --   --  4.6     BAL with MSSA, haemophilus, influenza      Assessment & Plan   Assessment / Plan     Active Hospital Problems:  Active Hospital Problems    Diagnosis    • **Multifocal pneumonia    • Bronchitis    • Cystic fibrosis with pulmonary manifestations (HCC)          Impression:   Cystic fibrosis with acute exacerbation  Bronchiectasis with difficulty with airway clearance  MSSA and haemophilus bronchopneumonia  Influenza A pneumonia  History of chronic constipation  Pancreatic insufficiency  Noncompliance with medications  Recurrent sinusitis    Plan:   Check CF mutation as we do not have his genotype in our system.  His genotype would help dictate his overall prognosis   continue aggressive nebulizers and bronchopulmonary hygiene.  Encourage patient to do aggressive airway clearance at home.  He has this in place however he is nonadherent   DC Zyvox. De-escalate antibiotics to Augmentin to complete 10 days  Complete Tamiflu  Continue with Creons 3 times a day.  Vitamin ADEK.   Laxatives as needed.  Encourage activity  High-calorie diet as tolerated      DVT prophylaxis:  Mechanical DVT prophylaxis orders are present.    CODE STATUS:   Code Status (Patient has no pulse and is not breathing): CPR (Attempt  to Resuscitate)  Medical Interventions (Patient has pulse or is breathing): Full Support    Stable for discharge from my perspective  Follow-up with us as an outpatient      I personally reviewed pertinent labs, imaging and provider notes.   Discussed with bedside nurse and will discuss with primary service.       Electronically signed by Gautam Law MD, 11/12/2022, 14:20 EST.

## 2022-11-12 NOTE — OUTREACH NOTE
Prep Survey    Flowsheet Row Responses   [facility] patient discharged from? [facility]   Is LACE score < 7 ? No   Emergency Room discharge w/ pulse ox? No   Eligibility [facility]   Date of Admission 11/08/22   Date of Discharge 11/12/22   Discharge Disposition Home or Self Care   Discharge diagnosis Multifocal pneumonia    Does the patient have one of the following disease processes/diagnoses(primary or secondary)? Pneumonia   Does the patient have Home health ordered? No   Is there a DME ordered? No   Prep survey completed? Yes          KYLIE WILSON - Registered Nurse

## 2022-11-12 NOTE — DISCHARGE SUMMARY
[facility]         HOSPITALIST  DISCHARGE SUMMARY    Patient Name: Marcus Jensen  : 1995  MRN: [id number]    Date of Admission: 2022  Date of Discharge: 2022  Primary Care Physician: Basilia Church APRN    Consults     Date and Time Order Name Status Description    2022  8:53 AM Inpatient Pulmonology Consult Completed           Active and Resolved Hospital Problems:  Active Hospital Problems    Diagnosis POA   • **Multifocal pneumonia [J18.9] Yes   • Bronchitis [J40] Unknown   • Cystic fibrosis with pulmonary manifestations (HCC) [E84.0] Unknown      Resolved Hospital Problems   No resolved problems to display.   Multifocal pneumonia due haemophilus influenza and MRSA  Influenza A  Bronchiectasis/issues with airway clearance  Cystic fibrosis with acute exacerbation  Pancreatic insufficiency  History of chronic constipation  Recurrent sinusitis    Hospital Course     Hospital Course:  Marcus Jensen is a 27 y.o. male with history of cystic fibrosis since childhood, supposed to be on chest vest and nebulizer treatment at home, noncompliance with medications, supposed to be on pancreatic enzymes at home but not taking it regularly.  He also has history of traumatic brain injury when younger.  He was admitted to hospital a month ago with MRSA pneumonia with worsening respiratory status, was discharged home on doxycycline.  He presented to the hospital with fever and worsening shortness of breath.  He has been coughing up thick mucoid phlegm.  In the emergency room, he was febrile up to 101 °F, tachycardic to 120s, had minimal oxygen desaturation.  Chest x-ray showed multifocal bilateral upper lobe infiltrate.  He was admitted for further care, pulmonology was consulted.  Initially started on broad-spectrum antibiotics, sputum culture grew haemophilus influenza.  He underwent bronchoscopy on 11/10 which revealed MRSA, haemophilus influenza as well as influenza A.     Antibiotics transition to Augmentin alone and Tamiflu started.  Weaned off of oxygen and symptomatically improved significantly.  He was ultimately discharged home in stable condition on 11/12/2022.  He will complete a course of Augmentin, Zyvox and Tamiflu.  Recommend follow-up with PCP in 1 week, pulmonology in 3 weeks.      Day of Discharge     Vital Signs:     Physical Exam:   Gen: NAD, WDWN  ENT: PERRL, EOMI   CV: RRR no MRG  Pulm: CTAB, minimal and improving rhonchi bilaterally, no increased work of breathing  GI: Abd soft, NTND, +bs  Neuro: Moving all extremities spontaneously, CN II-XII grossly intact   Psych: A&O*3, normal mood and affect  Skin: No lesions or rashes noted    Discharge Details        Discharge Medications      New Medications      Instructions Start Date   amoxicillin-clavulanate 875-125 MG per tablet  Commonly known as: AUGMENTIN   1 tablet, Oral, Every 12 Hours Scheduled      cholecalciferol 25 MCG (1000 UT) tablet  Commonly known as: VITAMIN D3   1,000 Units, Oral, Daily      linezolid 600 MG tablet  Commonly known as: Zyvox   600 mg, Oral, 2 Times Daily      oseltamivir 75 MG capsule  Commonly known as: TAMIFLU   75 mg, Oral, Every 12 Hours Scheduled      sennosides-docusate 8.6-50 MG per tablet  Commonly known as: PERICOLACE   1 tablet, Oral, Daily      vitamin E 400 Units capsule   400 Units, Oral, Daily         Continue These Medications      Instructions Start Date   albuterol (2.5 MG/3ML) 0.083% nebulizer solution  Commonly known as: PROVENTIL   2.5 mg, Nebulization, Every 4 Hours PRN      benzonatate 100 MG capsule  Commonly known as: Tessalon Perles   100 mg, Oral, 3 Times Daily PRN      budesonide 0.5 MG/2ML nebulizer solution  Commonly known as: PULMICORT   0.5 mg, Nebulization, 2 Times Daily      Creon 6000-39144 units capsule delayed-release particles capsule  Generic drug: pancrelipase (Lip-Prot-Amyl)   6,000 units of lipase, Oral, 3 Times Daily With Meals, And two capsules  with each snack      formoterol 20 MCG/2ML nebulizer solution  Commonly known as: Perforomist   20 mcg, Nebulization, 2 Times Daily - RT      revefenacin 175 MCG/3ML nebulizer solution  Commonly known as: YUPELRI   175 mcg, Nebulization, Daily - RT         Stop These Medications    Zithromax Z-Lei 250 MG tablet  Generic drug: azithromycin            No Known Allergies    Discharge Disposition:  Home or Self Care    Diet:  Hospital:  No active diet order      Discharge Activity:   Activity Instructions     Activity as Tolerated            CODE STATUS:  Code Status and Medical Interventions:   Ordered at: 11/08/22 1924     Code Status (Patient has no pulse and is not breathing):    CPR (Attempt to Resuscitate)     Medical Interventions (Patient has pulse or is breathing):    Full Support         Future Appointments   Date Time Provider Department Center   11/22/2022 10:15 AM Basilia Church APRN [facility]       Additional Instructions for the Follow-ups that You Need to Schedule     Discharge Follow-up with PCP   As directed       Currently Documented PCP:    Basilia Church APRN    PCP Phone Number:    [phone]     Follow Up Details: 3-5 days         Discharge Follow-up with Specified Provider: Pulmonology; 3 Weeks   As directed      To: Pulmonology    Follow Up: 3 Weeks               Pertinent  and/or Most Recent Results     PROCEDURES:   Bronchoscopy    LAB RESULTS:      Lab 11/12/22  0841 11/11/22  0533 11/10/22  0922 11/09/22  0519 11/08/22  1845 11/08/22  1523   WBC 9.90 19.91* 16.70* 18.77*  --  24.31*   HEMOGLOBIN 12.1* 11.6* 12.2* 11.7*  --  12.6*   HEMATOCRIT 37.8 35.8* 36.7* 35.1*  --  38.9   PLATELETS 669* 575* 576* 540*  --  659*   NEUTROS ABS 7.00 16.38* 13.47* 16.62*  --  20.58*   IMMATURE GRANS (ABS) 0.10* 0.14* 0.09* 0.11*  --  0.13*   LYMPHS ABS 2.21 2.38 1.78 0.88  --  2.22   MONOS ABS 0.50 0.88 1.13* 0.94*  --  1.22*   EOS ABS 0.06 0.07 0.16 0.13  --  0.03   MCV 83.6 83.3 82.1 82.4  --   83.3   LACTATE  --   --   --  0.8 0.8 2.2*         Lab 11/12/22  0841 11/11/22  0533 11/10/22  0618 11/09/22  0519 11/08/22  1523   SODIUM 135* 131* 133* 131* 133*   POTASSIUM 4.2 4.5 4.4 4.0 5.0   CHLORIDE 98 94* 96* 96* 95*   CO2 27.6 29.9* 25.6 25.1 26.9   ANION GAP 9.4 7.1 11.4 9.9 11.1   BUN 10 12 13 14 14   CREATININE 0.60* 0.78 0.65*  0.65* 0.64* 0.80   EGFR 136.5 126.1 133.3  133.3 133.9 125.2   GLUCOSE 105* 123* 121* 146* 217*   CALCIUM 9.1 8.4* 8.4* 8.6 9.2   MAGNESIUM 2.5 2.1  --   --   --    PHOSPHORUS 3.8 3.5  --   --   --          Lab 11/08/22  1523   TOTAL PROTEIN 7.6   ALBUMIN 3.00*   GLOBULIN 4.6   ALT (SGPT) 19   AST (SGOT) 17   BILIRUBIN 0.2   ALK PHOS 160*         Lab 11/08/22  1523   PROBNP <36.0   TROPONIN T <0.010                 Brief Urine Lab Results  (Last result in the past 365 days)      Color   Clarity   Blood   Leuk Est   Nitrite   Protein   CREAT   Urine HCG        09/30/22 2031 Yellow   Clear   Negative   Negative   Negative   Negative               Microbiology Results (last 10 days)     Procedure Component Value - Date/Time    Respiratory Culture - Wash, Bronchus [[id number]]  (Abnormal) Collected: 11/10/22 1132    Lab Status: Final result Specimen: Wash from Bronchus Updated: 11/13/22 0850     Respiratory Culture Scant growth (1+) Staphylococcus aureus, MRSA     Comment:   Methicillin resistant Staphylococcus aureus, Patient may be an isolation risk.         Scant growth (1+) Normal Respiratory Bridgett     Gram Stain Few (2+) Gram negative coccobacilli      Occasional Gram positive cocci      WBCs seen    Narrative:      Refer to other Respiratory Culture from same day for MICS    AFB Culture - Lavage, Lung, Right Lower Lobe [[id number]] Collected: 11/10/22 1129    Lab Status: Preliminary result Specimen: Lavage from Lung, Right Lower Lobe Updated: 11/10/22 1543     AFB Stain No acid fast bacilli seen on direct smear    BAL Culture, Quantitative - Lavage, Lung, Right Lower Lobe  [[id number]]  (Abnormal)  (Susceptibility) Collected: 11/10/22 1129    Lab Status: Final result Specimen: Lavage from Lung, Right Lower Lobe Updated: 11/13/22 0834     BAL Culture Light growth (2+) Staphylococcus aureus, MRSA     Comment:   Methicillin resistant Staphylococcus aureus, Patient may be an isolation risk.         Scant growth (1+) Normal Respiratory Bridgett     Gram Stain Rare (1+) Gram negative coccobacilli      Occasional Gram positive cocci      Moderate (3+) WBCs seen    Susceptibility      Staphylococcus aureus, MRSA      GUSTAVO      Clindamycin Susceptible      Inducible Clindamycin Resistance Negative      Linezolid Susceptible      Oxacillin Resistant     Tetracycline Susceptible      Trimethoprim + Sulfamethoxazole Susceptible      Vancomycin Susceptible                       Susceptibility Comments     Staphylococcus aureus, MRSA        This isolate does not demonstrate inducible clindamycin resistance in vitro.               Pneumonia Panel - Lavage, Lung, Right Lower Lobe [[id number]]  (Abnormal) Collected: 11/10/22 1129    Lab Status: Final result Specimen: Lavage from Lung, Right Lower Lobe Updated: 11/10/22 1401     Escherichia coli PCR Not Detected     Acinetobacter calcoaceticus-baumannii complex PCR Not Detected     Enterobacter cloacae PCR Not Detected     Klebsiella oxytoca PCR Not Detected     Klebsiella pneumoniae group PCR Not Detected     Klebsiella aerogenes PCR Not Detected     Moraxella catarrhalis PCR Not Detected     Proteus species PCR Not Detected     Pseudomonas aeroginosa PCR Not Detected     Serratia marcescens PCR Not Detected     Staphylococcus aureus PCR Detected     Comment: 10^4 Bin Copies/mL        Streptococcus pyogenes PCR Not Detected     Haemophilus influenzae PCR Detected     Comment: 10^6 Bin copies/mL        Streptococcus agalactiae PCR Not Detected     Streptococcus pneumoniae PCR Not Detected     Chlamydophila pneumoniae PCR Not Detected     Legionella  pneumophilia PCR Not Detected     Mycoplasma pneumo by PCR Not Detected     ADENOVIRUS, PCR Not Detected     CTX-M Gene N/A     IMP Gene N/A     KPC Gene N/A     mecA/C and MREJ Gene Not Detected     NDM Gene N/A     OXA-48-like Gene N/A     VIM Gene N/A     Coronavirus Not Detected     Human Metapneumovirus Not Detected     Human Rhinovirus/Enterovirus Not Detected     Influenza A PCR Detected     Influenza B PCR Not Detected     RSV, PCR Not Detected     Parainfluenza virus PCR Not Detected    MRSA Screen, PCR (Inpatient) - Swab, Nares [[id number]]  (Normal) Collected: 11/09/22 0426    Lab Status: Final result Specimen: Swab from Nares Updated: 11/09/22 0605     MRSA PCR No MRSA Detected    Narrative:      The negative predictive value of this diagnostic test is high and should only be used to consider de-escalating anti-MRSA therapy. A positive result may indicate colonization with MRSA and must be correlated clinically.    Respiratory Culture - Sputum, Cough [[id number]]  (Abnormal) Collected: 11/08/22 1841    Lab Status: Final result Specimen: Sputum from Cough Updated: 11/10/22 1211     Respiratory Culture Moderate growth (3+) Haemophilus influenzae     Comment: This isolate is beta-lactamase NEGATIVE and are routinely susceptible to ampicillin and other beta-lactams.              Light growth (2+) Normal Respiratory Bridgett     Gram Stain Few (2+) Gram negative bacilli    Influenza Antigen, Rapid - Swab, Nasopharynx [[id number]]  (Normal) Collected: 11/08/22 1620    Lab Status: Final result Specimen: Swab from Nasopharynx Updated: 11/08/22 1721     Influenza A Ag, EIA Negative     Influenza B Ag, EIA Negative    Blood Culture - Blood, Arm, Left [[id number]]  (Normal) Collected: 11/08/22 1538    Lab Status: Final result Specimen: Blood from Arm, Left Updated: 11/13/22 1546     Blood Culture No growth at 5 days    Blood Culture - Blood, Arm, Right [[id number]]  (Normal) Collected: 11/08/22 1523    Lab Status: Final  result Specimen: Blood from Arm, Right Updated: 11/13/22 1532     Blood Culture No growth at 5 days    COVID-19,APTIMA PANTHER(KOURTNEY),[facility], NP/OP SWAB IN UTM/VTM/[facility] [employer]SP[employer] MEDIA,24 HR TAT - Swab, Nasopharynx [[id number]]  (Normal) Collected: 11/03/22 2137    Lab Status: Final result Specimen: Swab from Nasopharynx Updated: 11/04/22 1753     COVID19 Not Detected    Narrative:      Fact sheet for providers: https://www.fda.gov/media/988261/download     Fact sheet for patients: https://www.fda.gov/media/830833/download    Test performed by RT PCR.          XR Chest 1 View    Result Date: 11/8/2022  Impression:   1. Patchy airspace opacities predominantly in the upper lungs, as seen on the prior chest radiograph, which could represent multifocal pneumonia. 2. Bronchiectasis, as before, consistent with cystic fibrosis.       AUBREY HDZ MD       Electronically Signed and Approved By: AUBREY HDZ MD on 11/08/2022 at 16:26             XR Chest PA & Lateral    Result Date: 11/8/2022  Impression:   1. Increasing multifocal upper lung predominant opacities concerning for multifocal pneumonia.     UMBERTO YOUNGBLOOD MD       Electronically Signed and Approved By: UMBERTO YOUNGBLOOD MD on 11/08/2022 at 14:12                 XR Chest 1 View    Result Date: 11/8/2022  Narrative: PROCEDURE: XR CHEST 1 VW  COMPARISON: [facility], CT, CT CHEST WO CONTRAST DIAGNOSTIC, 10/01/2022, 20:21.  [facility], CR, XR CHEST 2 VW, 9/30/2022, 17:53.  [facility], CR, XR CHEST PA AND LATERAL, 11/08/2022, 14:10.  INDICATIONS: COUGH/CONGESTION/TROUBLE BREATHING  FINDINGS:  Upper lobe predominant patchy airspace opacities appear similar to the most recent prior exam.  There is bronchiectasis, as before.  No significant effusion or pneumothorax is seen.  Heart size and mediastinal contour appear unchanged.  No acute osseous abnormality is seen.      Impression:   1. Patchy airspace opacities  predominantly in the upper lungs, as seen on the prior chest radiograph, which could represent multifocal pneumonia. 2. Bronchiectasis, as before, consistent with cystic fibrosis.       AUBREY HDZ MD       Electronically Signed and Approved By: AUBREY HDZ MD on 11/08/2022 at 16:26             XR Chest PA & Lateral    Result Date: 11/8/2022  Narrative: PROCEDURE: XR CHEST PA AND LATERAL  COMPARISON: [facility], CR, CHEST AP/PA 1 VIEW, 1/22/2020, 6:33.  [facility], CR, XR CHEST 2 VW, 9/30/2022, 17:53.  [facility], CT, CT CHEST WO CONTRAST DIAGNOSTIC, 10/01/2022, 20:21.  INDICATIONS: PERSISTENT COUGH, BRONCHITIS. CYSTIC FIBROSIS  FINDINGS:  Redemonstration of changes of bronchiectasis with upper lobe predominance, consistent with patient's given history of cystic fibrosis.  There are patchy upper lobe predominant nodular opacities bilaterally suggestive of superimposed pneumonia which are more prominent than on prior exams.  Resolution of lower lobe opacity seen on recent chest CT.      Impression:   1. Increasing multifocal upper lung predominant opacities concerning for multifocal pneumonia.     UMBERTO YOUNGBLOOD MD       Electronically Signed and Approved By: UMBERTO YOUNGBLOOD MD on 11/08/2022 at 14:12                       Labs Pending at Discharge:  Pending Labs     Order Current Status    Cystic Fibrosis Gene Test In process    Fungus Culture - Lavage, Lung, Right Lower Lobe In process    IgE In process    Non-gynecologic Cytology In process    Virus Culture - Lavage, Lung, Right Lower Lobe In process    AFB Culture - Lavage, Lung, Right Lower Lobe Preliminary result            Time spent on Discharge including face to face service:  33 minutes    Electronically signed by Silvano Weir MD, 11/12/22, 2:22 PM EST.

## 2022-11-12 NOTE — PLAN OF CARE
Goal Outcome Evaluation:  Plan of Care Reviewed With: patient        Progress: no change  Outcome Evaluation: Pt. currently resting. Pt. temp has been within normal range this shift. Pt. denies any pain or discomfort this shift. No new issues/needs noted at this time.

## 2022-11-13 LAB
BACTERIA SPEC AEROBE CULT: ABNORMAL
BACTERIA SPEC AEROBE CULT: ABNORMAL
BACTERIA SPEC AEROBE CULT: NORMAL
BACTERIA SPEC AEROBE CULT: NORMAL
BACTERIA SPEC RESP CULT: ABNORMAL
BACTERIA SPEC RESP CULT: ABNORMAL
GRAM STN SPEC: ABNORMAL

## 2022-11-13 RX ORDER — LINEZOLID 600 MG/1
600 TABLET, FILM COATED ORAL 2 TIMES DAILY
Qty: 28 TABLET | Refills: 0 | Status: SHIPPED | OUTPATIENT
Start: 2022-11-13 | End: 2022-11-27

## 2022-11-14 ENCOUNTER — TRANSITIONAL CARE MANAGEMENT TELEPHONE ENCOUNTER (OUTPATIENT)
Dept: CALL CENTER | Facility: HOSPITAL | Age: 27
End: 2022-11-14

## 2022-11-14 LAB
CYTO UR: NORMAL
LAB AP CASE REPORT: NORMAL
LAB AP CLINICAL INFORMATION: NORMAL
LAB AP SPECIAL STAINS: NORMAL
PATH REPORT.FINAL DX SPEC: NORMAL
PATH REPORT.GROSS SPEC: NORMAL
STAT OF ADQ CVX/VAG CYTO-IMP: NORMAL
VIRUS SPEC CULT: ABNORMAL

## 2022-11-15 LAB — IGE SERPL-ACNC: 291 KU/L

## 2022-11-22 ENCOUNTER — READMISSION MANAGEMENT (OUTPATIENT)
Dept: CALL CENTER | Facility: HOSPITAL | Age: 27
End: 2022-11-22

## 2022-11-22 NOTE — OUTREACH NOTE
COPD/PN Week 2 Survey    Flowsheet Row Responses   [facility] facility patient discharged from? [facility]   Does the patient have one of the following disease processes/diagnoses(primary or secondary)? Pneumonia   Week 2 attempt successful? No   Unsuccessful attempts Attempt 1          GERRY Fenton Registered Nurse

## 2022-12-08 LAB — FUNGUS WND CULT: NORMAL

## 2022-12-22 LAB
MYCOBACTERIUM SPEC CULT: NORMAL
NIGHT BLUE STAIN TISS: NORMAL
NIGHT BLUE STAIN TISS: NORMAL

## 2023-08-03 DIAGNOSIS — J47.1 BRONCHIECTASIS WITH ACUTE EXACERBATION: ICD-10-CM

## 2023-08-03 DIAGNOSIS — E84.0 CYSTIC FIBROSIS WITH PULMONARY MANIFESTATIONS: ICD-10-CM

## 2023-08-03 RX ORDER — BUDESONIDE 0.5 MG/2ML
INHALANT ORAL
Qty: 60 EACH | Refills: 11 | Status: SHIPPED | OUTPATIENT
Start: 2023-08-03

## 2023-08-18 NOTE — PROGRESS NOTES
Primary Care Provider  Basilia Church APRN   Referring Provider  No ref. provider found    Patient Complaint  Follow-up and Cough      SUBJECTIVE    History of Presenting Illness  Marcus Jensen is a pleasant 28 y.o. male who presents to [facility] for followup appointment. Mr. Jensen  has a diagnosis of cystic fibrosis from childhood and was followed at [facility] cystic fibrosis clinic until several years back when he was transition to adult clinic.  He has a history of having MSSA pneumonia and acute hypoxic respiratory failure in 2019.  His last pulmonary function test was in 2019. And his last CT scan of his chest was 1/15/2020.  Patient has had several CT scans ordered but has not been compliant in getting them done.  I last saw the patient 9/30/2022 where I sent him to the emergency room for temperature of 102, tachycardia.  Patient was in the hospital 9/30/2022 through 10/3/2022 for sepsis.  He again went back into the hospital 11/8/2022 through 11/12/2022 for multifocal pneumonia.  He had underwent a bronchoscopy 11/8/2022 while he was in the hospital for mucous plugging.  Findings from bronchoscopy were bilateral mucous plugging lower lobes, friable mucosa, scattered purulent secretions, scattered white patches in trachea.  Pathology cytology were negative for malignant cells, no fungus isolated, no AFB isolated.  He did have Staph aureus and haemophilus influenza positive.  Patient was treated with Augmentin, Zyvox and Tamiflu.  Patient was to follow-up with pulmonology after being discharged from the hospital however he failed to show up for appointments.     Patient states he is needing a refill today on his Creon and vitamin D.  He has had no recent labs.  He has not followed up with his PCP either.  Patient states he has not had any other hospitalizations since November.  He feels his breathing is at baseline.  Overall patient states  he is doing well at this time.  He is not having any dyspnea, coughing, wheezing, headaches, chest pain, weight loss or hemoptysis. Denies fevers, chills and night sweats. Marcus Jensen is able to perform ADLs without difficulties and denies any swollen glands/lymph nodes in the head or neck.  He continues to chew tobacco and smoke marijuana daily.    I have personally reviewed the review of systems, past family, social, medical and surgical histories; and agree with their findings.    Review of Systems  Constitutional symptoms:  Denied complaints   Ear, nose, throat: Denied complaints  Cardiovascular:  Denied complaints  Respiratory: Denied complaints  Gastrointestinal: Denied complaints  Musculoskeletal: Denied complaints    Family History   Problem Relation Age of Onset    Asthma Sister         Social History     Socioeconomic History    Marital status:    Tobacco Use    Smoking status: Never    Smokeless tobacco: Current     Types: Chew   Vaping Use    Vaping Use: Never used   Substance and Sexual Activity    Alcohol use: Not Currently     Comment: About a year    Drug use: Yes     Types: Marijuana    Sexual activity: Not Currently        Past Medical History:   Diagnosis Date    Cystic fibrosis     Head injury         Immunization History   Administered Date(s) Administered    DTaP, Unspecified 06/12/1997, 01/28/1998, 08/08/2000    Hep B, Adolescent or Pediatric 01/28/1998    HiB 06/12/1997, 01/28/1998    IPV 06/12/1997, 08/08/2000    MMR 08/08/2000, 10/09/2000    Meningococcal Polysaccharide 08/05/2009    Tdap 08/05/2009       No Known Allergies       Current Outpatient Medications:     albuterol (PROVENTIL) (2.5 MG/3ML) 0.083% nebulizer solution, Take 2.5 mg by nebulization Every 4 (Four) Hours As Needed for Wheezing., Disp: 120 each, Rfl: 5    budesonide (PULMICORT) 0.5 MG/2ML nebulizer solution, Take 2 mL by nebulization 2 (Two) Times a Day., Disp: 60 each, Rfl: 11    cholecalciferol (VITAMIN D3)  "25 MCG (1000 UT) tablet, Take 1 tablet by mouth Daily., Disp: 30 tablet, Rfl: 0    formoterol (Perforomist) 20 MCG/2ML nebulizer solution, Take 2 mL by nebulization 2 (Two) Times a Day., Disp: 120 mL, Rfl: 11    pancrelipase, Lip-Prot-Amyl, (Creon) 6000-80272 units capsule delayed-release particles capsule, Take 1 capsule by mouth 3 (Three) Times a Day With Meals. And two capsules with each snack, Disp: 210 capsule, Rfl: 0    revefenacin (YUPELRI) 175 MCG/3ML nebulizer solution, Take 3 mL by nebulization Daily., Disp: 90 mL, Rfl: 5    cetirizine (zyrTEC) 10 MG tablet, Take 1 tablet by mouth Daily., Disp: 90 tablet, Rfl: 3    vitamin E 400 Units capsule, Take 1 capsule by mouth Daily., Disp: 30 capsule, Rfl: 0           Vital Signs   /66 (BP Location: Right arm, Patient Position: Sitting, Cuff Size: Adult)   Pulse 94   Temp 98 øF (36.7 øC) (Temporal)   Resp 16   Ht 154.9 cm (61\")   Wt 46.4 kg (102 lb 4.8 oz)   SpO2 96% Comment: room air  BMI 19.33 kg/mý       OBJECTIVE    Physical Exam  Vital Signs Reviewed   WDWN, Alert, NAD.    HEENT:  PERRL, EOMI.  OP, nares clear  Neck:  Supple, no JVD, no thyromegaly  Chest:  good aeration, clear to auscultation bilaterally, tympanic to percussion bilaterally, no work of breathing noted  CV: RRR, no MGR, pulses 2+, equal.  Abd:  Soft, NT, ND, + BS, no HSM  EXT:  no clubbing, no cyanosis, no edema  Neuro:  A&Ox3, CN grossly intact, no focal deficits.  Skin: No rashes or lesions noted    Results Review  I have personally reviewed the prior office notes, hospital records, labs, and diagnostics.    ASSESSMENT         Patient Active Problem List   Diagnosis    Cystic fibrosis with pulmonary manifestations    Bronchiectasis without acute exacerbation    Sepsis, due to unspecified organism, unspecified whether acute organ dysfunction present    Acute cough    Bronchitis    Multifocal pneumonia       Encounter Diagnoses   Name Primary?    Cystic fibrosis with pulmonary " manifestations Yes    Bronchiectasis without acute exacerbation     Tobacco chew use     Marijuana abuse     Poor compliance with medication     Vitamin D deficiency         PLAN  At this time we will order vitamin D, CBC, and a CMP blood test.  I will notify patient of his results when available.  I will refill patient's medications for his nebulizer.  I will give him 30 days of Creon and vitamin D with instructions he is to follow-up with his PCP to get further refills and follow-up on his blood work.  Patient continues to chew tobacco and smoke marijuana and has no intention of quitting.  I encouraged the patient to be compliant with his follow-up appointments for his health.  I did discuss with patient vaccinations with patient and we will revisit vaccinations again at his follow-up.  I recommended patient to return to office in 3 months or sooner if needed.  I recommend patient to continue to follow-up with his PCP.  Patient verbalizes understanding.  Diagnoses and all orders for this visit:    1. Cystic fibrosis with pulmonary manifestations (Primary)  -     albuterol (PROVENTIL) (2.5 MG/3ML) 0.083% nebulizer solution; Take 2.5 mg by nebulization Every 4 (Four) Hours As Needed for Wheezing.  Dispense: 120 each; Refill: 5  -     formoterol (Perforomist) 20 MCG/2ML nebulizer solution; Take 2 mL by nebulization 2 (Two) Times a Day.  Dispense: 120 mL; Refill: 11  -     budesonide (PULMICORT) 0.5 MG/2ML nebulizer solution; Take 2 mL by nebulization 2 (Two) Times a Day.  Dispense: 60 each; Refill: 11  -     revefenacin (YUPELRI) 175 MCG/3ML nebulizer solution; Take 3 mL by nebulization Daily.  Dispense: 90 mL; Refill: 5  -     pancrelipase, Lip-Prot-Amyl, (Creon) 6000-01912 units capsule delayed-release particles capsule; Take 1 capsule by mouth 3 (Three) Times a Day With Meals. And two capsules with each snack  Dispense: 210 capsule; Refill: 0  -     cetirizine (zyrTEC) 10 MG tablet; Take 1 tablet by mouth Daily.   Dispense: 90 tablet; Refill: 3  -     CBC & Differential; Future  -     Comprehensive Metabolic Panel; Future    2. Bronchiectasis without acute exacerbation  -     albuterol (PROVENTIL) (2.5 MG/3ML) 0.083% nebulizer solution; Take 2.5 mg by nebulization Every 4 (Four) Hours As Needed for Wheezing.  Dispense: 120 each; Refill: 5  -     formoterol (Perforomist) 20 MCG/2ML nebulizer solution; Take 2 mL by nebulization 2 (Two) Times a Day.  Dispense: 120 mL; Refill: 11  -     budesonide (PULMICORT) 0.5 MG/2ML nebulizer solution; Take 2 mL by nebulization 2 (Two) Times a Day.  Dispense: 60 each; Refill: 11  -     revefenacin (YUPELRI) 175 MCG/3ML nebulizer solution; Take 3 mL by nebulization Daily.  Dispense: 90 mL; Refill: 5  -     cetirizine (zyrTEC) 10 MG tablet; Take 1 tablet by mouth Daily.  Dispense: 90 tablet; Refill: 3    3. Tobacco chew use    4. Marijuana abuse    5. Poor compliance with medication    6. Vitamin D deficiency  -     cholecalciferol (VITAMIN D3) 25 MCG (1000 UT) tablet; Take 1 tablet by mouth Daily.  Dispense: 30 tablet; Refill: 0  -     Vitamin D 25 Hydroxy; Future             Smoking status:current  Vaccination status:declines  Medications personally reviewed    Follow Up  Return in about 3 months (around 11/21/2023).    Patient was given instructions and counseling regarding his condition or for health maintenance advice. Please see specific information pulled into the AVS if appropriate.

## 2023-08-21 ENCOUNTER — OFFICE VISIT (OUTPATIENT)
Dept: PULMONOLOGY | Facility: CLINIC | Age: 28
End: 2023-08-21
Payer: COMMERCIAL

## 2023-08-21 ENCOUNTER — LAB (OUTPATIENT)
Dept: LAB | Facility: HOSPITAL | Age: 28
End: 2023-08-21
Payer: COMMERCIAL

## 2023-08-21 VITALS
HEART RATE: 94 BPM | DIASTOLIC BLOOD PRESSURE: 66 MMHG | SYSTOLIC BLOOD PRESSURE: 126 MMHG | WEIGHT: 102.3 LBS | TEMPERATURE: 98 F | OXYGEN SATURATION: 96 % | BODY MASS INDEX: 19.31 KG/M2 | RESPIRATION RATE: 16 BRPM | HEIGHT: 61 IN

## 2023-08-21 DIAGNOSIS — E55.9 VITAMIN D DEFICIENCY: ICD-10-CM

## 2023-08-21 DIAGNOSIS — F12.10 MARIJUANA ABUSE: ICD-10-CM

## 2023-08-21 DIAGNOSIS — Z91.148 POOR COMPLIANCE WITH MEDICATION: ICD-10-CM

## 2023-08-21 DIAGNOSIS — E84.0 CYSTIC FIBROSIS WITH PULMONARY MANIFESTATIONS: ICD-10-CM

## 2023-08-21 DIAGNOSIS — Z11.59 NEED FOR HEPATITIS C SCREENING TEST: ICD-10-CM

## 2023-08-21 DIAGNOSIS — Z72.0 TOBACCO CHEW USE: ICD-10-CM

## 2023-08-21 DIAGNOSIS — Z00.00 ANNUAL PHYSICAL EXAM: ICD-10-CM

## 2023-08-21 DIAGNOSIS — E84.0 CYSTIC FIBROSIS WITH PULMONARY MANIFESTATIONS: Primary | ICD-10-CM

## 2023-08-21 DIAGNOSIS — J47.9 BRONCHIECTASIS WITHOUT ACUTE EXACERBATION: ICD-10-CM

## 2023-08-21 DIAGNOSIS — R73.01 IMPAIRED FASTING GLUCOSE: ICD-10-CM

## 2023-08-21 LAB
25(OH)D3 SERPL-MCNC: 40.9 NG/ML (ref 30–100)
BASOPHILS # BLD AUTO: 0.11 10*3/MM3 (ref 0–0.2)
BASOPHILS NFR BLD AUTO: 0.7 % (ref 0–1.5)
DEPRECATED RDW RBC AUTO: 41.2 FL (ref 37–54)
EOSINOPHIL # BLD AUTO: 0.48 10*3/MM3 (ref 0–0.4)
EOSINOPHIL NFR BLD AUTO: 3 % (ref 0.3–6.2)
ERYTHROCYTE [DISTWIDTH] IN BLOOD BY AUTOMATED COUNT: 12.8 % (ref 12.3–15.4)
HBA1C MFR BLD: 6.7 % (ref 4.8–5.6)
HCT VFR BLD AUTO: 41 % (ref 37.5–51)
HCV AB SER DONR QL: NORMAL
HGB BLD-MCNC: 13.8 G/DL (ref 13–17.7)
IMM GRANULOCYTES # BLD AUTO: 0.05 10*3/MM3 (ref 0–0.05)
IMM GRANULOCYTES NFR BLD AUTO: 0.3 % (ref 0–0.5)
LYMPHOCYTES # BLD AUTO: 2.66 10*3/MM3 (ref 0.7–3.1)
LYMPHOCYTES NFR BLD AUTO: 16.6 % (ref 19.6–45.3)
MCH RBC QN AUTO: 30 PG (ref 26.6–33)
MCHC RBC AUTO-ENTMCNC: 33.7 G/DL (ref 31.5–35.7)
MCV RBC AUTO: 89.1 FL (ref 79–97)
MONOCYTES # BLD AUTO: 1.21 10*3/MM3 (ref 0.1–0.9)
MONOCYTES NFR BLD AUTO: 7.6 % (ref 5–12)
NEUTROPHILS NFR BLD AUTO: 11.51 10*3/MM3 (ref 1.7–7)
NEUTROPHILS NFR BLD AUTO: 71.8 % (ref 42.7–76)
NRBC BLD AUTO-RTO: 0 /100 WBC (ref 0–0.2)
PLATELET # BLD AUTO: 443 10*3/MM3 (ref 140–450)
PMV BLD AUTO: 11.4 FL (ref 6–12)
RBC # BLD AUTO: 4.6 10*6/MM3 (ref 4.14–5.8)
WBC NRBC COR # BLD: 16.02 10*3/MM3 (ref 3.4–10.8)

## 2023-08-21 PROCEDURE — 99214 OFFICE O/P EST MOD 30 MIN: CPT | Performed by: NURSE PRACTITIONER

## 2023-08-21 PROCEDURE — 36415 COLL VENOUS BLD VENIPUNCTURE: CPT

## 2023-08-21 PROCEDURE — 80061 LIPID PANEL: CPT

## 2023-08-21 PROCEDURE — 83036 HEMOGLOBIN GLYCOSYLATED A1C: CPT

## 2023-08-21 PROCEDURE — 86803 HEPATITIS C AB TEST: CPT

## 2023-08-21 PROCEDURE — 84443 ASSAY THYROID STIM HORMONE: CPT

## 2023-08-21 PROCEDURE — 80053 COMPREHEN METABOLIC PANEL: CPT

## 2023-08-21 PROCEDURE — 1160F RVW MEDS BY RX/DR IN RCRD: CPT | Performed by: NURSE PRACTITIONER

## 2023-08-21 PROCEDURE — 1159F MED LIST DOCD IN RCRD: CPT | Performed by: NURSE PRACTITIONER

## 2023-08-21 PROCEDURE — 84439 ASSAY OF FREE THYROXINE: CPT

## 2023-08-21 PROCEDURE — 85025 COMPLETE CBC W/AUTO DIFF WBC: CPT

## 2023-08-21 PROCEDURE — 82306 VITAMIN D 25 HYDROXY: CPT

## 2023-08-21 RX ORDER — PANCRELIPASE 30000; 6000; 19000 [USP'U]/1; [USP'U]/1; [USP'U]/1
6000 CAPSULE, DELAYED RELEASE PELLETS ORAL
Qty: 210 CAPSULE | Refills: 0 | Status: CANCELLED | OUTPATIENT
Start: 2023-08-21

## 2023-08-21 RX ORDER — PANCRELIPASE 30000; 6000; 19000 [USP'U]/1; [USP'U]/1; [USP'U]/1
6000 CAPSULE, DELAYED RELEASE PELLETS ORAL
Qty: 210 CAPSULE | Refills: 0 | Status: SHIPPED | OUTPATIENT
Start: 2023-08-21 | End: 2023-08-23 | Stop reason: SDUPTHER

## 2023-08-21 RX ORDER — CETIRIZINE HYDROCHLORIDE 10 MG/1
10 TABLET ORAL DAILY
Qty: 90 TABLET | Refills: 3 | Status: SHIPPED | OUTPATIENT
Start: 2023-08-21 | End: 2023-08-23 | Stop reason: SDUPTHER

## 2023-08-21 RX ORDER — ALBUTEROL SULFATE 2.5 MG/3ML
2.5 SOLUTION RESPIRATORY (INHALATION) EVERY 4 HOURS PRN
Qty: 120 EACH | Refills: 5
Start: 2023-08-21

## 2023-08-21 RX ORDER — BUDESONIDE 0.5 MG/2ML
0.5 INHALANT ORAL 2 TIMES DAILY
Qty: 60 EACH | Refills: 11 | Status: SHIPPED | OUTPATIENT
Start: 2023-08-21

## 2023-08-21 RX ORDER — MELATONIN
1000 DAILY
Qty: 30 TABLET | Refills: 0 | Status: SHIPPED | OUTPATIENT
Start: 2023-08-21 | End: 2023-08-23 | Stop reason: SDUPTHER

## 2023-08-21 RX ORDER — FORMOTEROL FUMARATE 20 UG/2ML
20 SOLUTION RESPIRATORY (INHALATION)
Qty: 120 ML | Refills: 11 | Status: SHIPPED | OUTPATIENT
Start: 2023-08-21

## 2023-08-22 DIAGNOSIS — E84.0 CYSTIC FIBROSIS WITH PULMONARY MANIFESTATIONS: Primary | ICD-10-CM

## 2023-08-22 LAB
ALBUMIN SERPL-MCNC: 3.3 G/DL (ref 3.5–5.2)
ALBUMIN/GLOB SERPL: 1.1 G/DL
ALP SERPL-CCNC: 189 U/L (ref 39–117)
ALT SERPL W P-5'-P-CCNC: 29 U/L (ref 1–41)
ANION GAP SERPL CALCULATED.3IONS-SCNC: 9.3 MMOL/L (ref 5–15)
AST SERPL-CCNC: 18 U/L (ref 1–40)
BILIRUB SERPL-MCNC: 0.2 MG/DL (ref 0–1.2)
BUN SERPL-MCNC: 11 MG/DL (ref 6–20)
BUN/CREAT SERPL: 11.2 (ref 7–25)
CALCIUM SPEC-SCNC: 9.4 MG/DL (ref 8.6–10.5)
CHLORIDE SERPL-SCNC: 102 MMOL/L (ref 98–107)
CHOLEST SERPL-MCNC: 109 MG/DL (ref 0–200)
CO2 SERPL-SCNC: 26.7 MMOL/L (ref 22–29)
CREAT SERPL-MCNC: 0.98 MG/DL (ref 0.76–1.27)
EGFRCR SERPLBLD CKD-EPI 2021: 108.4 ML/MIN/1.73
GLOBULIN UR ELPH-MCNC: 3 GM/DL
GLUCOSE SERPL-MCNC: 102 MG/DL (ref 65–99)
HDLC SERPL-MCNC: 31 MG/DL (ref 40–60)
LDLC SERPL CALC-MCNC: 64 MG/DL (ref 0–100)
LDLC/HDLC SERPL: 2.1 {RATIO}
POTASSIUM SERPL-SCNC: 3.8 MMOL/L (ref 3.5–5.2)
PROT SERPL-MCNC: 6.3 G/DL (ref 6–8.5)
SODIUM SERPL-SCNC: 138 MMOL/L (ref 136–145)
T4 FREE SERPL-MCNC: 0.99 NG/DL (ref 0.93–1.7)
TRIGL SERPL-MCNC: 65 MG/DL (ref 0–150)
TSH SERPL DL<=0.05 MIU/L-ACNC: 4.4 UIU/ML (ref 0.27–4.2)
VLDLC SERPL-MCNC: 14 MG/DL (ref 5–40)

## 2023-08-22 NOTE — PROGRESS NOTES
Chief Complaint  Follow-up (Patient has no new issues or concerns ) and Labs Only (Were done for this visit)  Subjective    History of Present Illness  Marcus Jensen is a 28 y.o. male who presents to [facility] for:     His annual physical exam.     2.    Follow-up lab results ordered by pulmonary.  HA1C is 6.70.  The patient reports his twin sister is diabetic and doesn't take medication for it.  Patient is followed by pulmonology for cystic fibrosis.      Current Outpatient Medications:     albuterol (PROVENTIL) (2.5 MG/3ML) 0.083% nebulizer solution, Take 2.5 mg by nebulization Every 4 (Four) Hours As Needed for Wheezing., Disp: 120 each, Rfl: 5    budesonide (PULMICORT) 0.5 MG/2ML nebulizer solution, Take 2 mL by nebulization 2 (Two) Times a Day., Disp: 60 each, Rfl: 11    cetirizine (zyrTEC) 10 MG tablet, Take 1 tablet by mouth Daily., Disp: 90 tablet, Rfl: 3    cholecalciferol (VITAMIN D3) 25 MCG (1000 UT) tablet, Take 1 tablet by mouth Daily., Disp: 90 tablet, Rfl: 3    formoterol (Perforomist) 20 MCG/2ML nebulizer solution, Take 2 mL by nebulization 2 (Two) Times a Day., Disp: 120 mL, Rfl: 11    pancrelipase, Lip-Prot-Amyl, (Creon) 6000-21497 units capsule delayed-release particles capsule, Take 1 capsule by mouth 3 (Three) Times a Day With Meals. And two capsules with each snack, Disp: 210 capsule, Rfl: 3    revefenacin (YUPELRI) 175 MCG/3ML nebulizer solution, Take 3 mL by nebulization Daily., Disp: 90 mL, Rfl: 5    vitamin E 400 Units capsule, Take 1 capsule by mouth Daily., Disp: 90 capsule, Rfl: 3  No Known Allergies   Past Medical History:   Diagnosis Date    Cystic fibrosis     Head injury       Past Surgical History:   Procedure Laterality Date    BRONCHOSCOPY N/A 11/10/2022    Procedure: BRONCHOSCOPY WITH BRONCHOALVEOLAR LAVAGE, BRONCHIAL WASHINGS, AIRWAY INSPECTION;  Surgeon: Bright Worley MD;  Location: [facility] ENDOSCOPY;  Service: Pulmonary;  Laterality:  "N/A;  MUCUS PLUGGING        Objective   /78 (BP Location: Left arm, Patient Position: Sitting, Cuff Size: Small Adult)   Pulse 102   Temp 97.8 øF (36.6 øC) (Temporal)   Ht 154.9 cm (61\")   Wt 45.3 kg (99 lb 12.8 oz)   SpO2 98%   BMI 18.86 kg/mý    Estimated body mass index is 18.86 kg/mý as calculated from the following:    Height as of this encounter: 154.9 cm (61\").    Weight as of this encounter: 45.3 kg (99 lb 12.8 oz).     Physical Exam  Vitals reviewed.   Constitutional:       General: He is not in acute distress.  HENT:      Head: Normocephalic and atraumatic.      Right Ear: Tympanic membrane and ear canal normal.      Left Ear: Tympanic membrane and ear canal normal.   Eyes:      Conjunctiva/sclera: Conjunctivae normal.   Cardiovascular:      Rate and Rhythm: Normal rate and regular rhythm.      Heart sounds: Normal heart sounds. No murmur heard.  Pulmonary:      Effort: Pulmonary effort is normal.      Breath sounds: Normal breath sounds. No wheezing, rhonchi or rales.   Abdominal:      General: There is no distension.      Palpations: Abdomen is soft. There is no mass.      Tenderness: There is no abdominal tenderness.   Musculoskeletal:      Right lower leg: No edema.      Left lower leg: No edema.   Lymphadenopathy:      Cervical: No cervical adenopathy.   Skin:     General: Skin is warm and dry.      Coloration: Skin is not jaundiced or pale.   Neurological:      General: No focal deficit present.      Mental Status: He is alert.   Psychiatric:         Mood and Affect: Mood normal.         Thought Content: Thought content normal.        Result Review :  The following data was reviewed by: DARINEL Paul on 08/23/2023:  CMP          11/11/2022    05:33 11/12/2022    08:41 8/21/2023    14:36   CMP   Glucose 123  105  102    BUN 12  10  11    Creatinine 0.78  0.60  0.98    EGFR 126.1  136.5  108.4    Sodium 131  135  138    Potassium 4.5  4.2  3.8    Chloride 94  98  102    Calcium 8.4  " 9.1  9.4    Total Protein   6.3    Albumin   3.3    Globulin   3.0    Total Bilirubin   0.2    Alkaline Phosphatase   189    AST (SGOT)   18    ALT (SGPT)   29    Albumin/Globulin Ratio   1.1    BUN/Creatinine Ratio 15.4  16.7  11.2    Anion Gap 7.1  9.4  9.3      CBC w/diff          11/11/2022    05:33 11/12/2022    08:41 8/21/2023    14:36   CBC w/Diff   WBC 19.91  9.90  16.02    RBC 4.30  4.52  4.60    Hemoglobin 11.6  12.1  13.8    Hematocrit 35.8  37.8  41.0    MCV 83.3  83.6  89.1    MCH 27.0  26.8  30.0    MCHC 32.4  32.0  33.7    RDW 14.9  15.1  12.8    Platelets 575  669  443    Neutrophil Rel % 82.2  70.7  71.8    Immature Granulocyte Rel % 0.7  1.0  0.3    Lymphocyte Rel % 12.0  22.3  16.6    Monocyte Rel % 4.4  5.1  7.6    Eosinophil Rel % 0.4  0.6  3.0    Basophil Rel % 0.3  0.3  0.7      Lipid Panel          8/21/2023    14:36   Lipid Panel   Total Cholesterol 109    Triglycerides 65    HDL Cholesterol 31    VLDL Cholesterol 14    LDL Cholesterol  64    LDL/HDL Ratio 2.10      TSH          8/21/2023    14:36   TSH   TSH 4.400      A1C Last 3 Results          10/1/2022    02:30 8/21/2023    14:36   HGBA1C Last 3 Results   Hemoglobin A1C 6.30  6.70                  Assessment and Plan   Diagnoses and all orders for this visit:    1. Annual PE (Primary)  -     Comprehensive Metabolic Panel; Future  -     CBC & Differential; Future  -     Lipid Panel; Future  -     T4, Free; Future  -     TSH; Future    2. New onset type 2 diabetes mellitus  -     Ambulatory Referral to Diabetic Education  -     Ambulatory Referral to Podiatry  -     Hemoglobin A1c; Future  -     Microalbumin / Creatinine Urine Ratio - Urine, Clean Catch; Future    3. Cystic fibrosis with pulmonary manifestations  -     Discontinue: cetirizine (zyrTEC) 10 MG tablet; Take 1 tablet by mouth Daily.  Dispense: 90 tablet; Refill: 3  -     Discontinue: pancrelipase, Lip-Prot-Amyl, (Creon) 6000-78027 units capsule delayed-release particles  capsule; Take 1 capsule by mouth 3 (Three) Times a Day With Meals. And two capsules with each snack  Dispense: 210 capsule; Refill: 3  -     Discontinue: cetirizine (zyrTEC) 10 MG tablet; Take 1 tablet by mouth Daily.  Dispense: 90 tablet; Refill: 3  -     Discontinue: pancrelipase, Lip-Prot-Amyl, (Creon) 6000-49771 units capsule delayed-release particles capsule; Take 1 capsule by mouth 3 (Three) Times a Day With Meals. And two capsules with each snack  Dispense: 210 capsule; Refill: 3  -     cetirizine (zyrTEC) 10 MG tablet; Take 1 tablet by mouth Daily.  Dispense: 90 tablet; Refill: 3  -     pancrelipase, Lip-Prot-Amyl, (Creon) 6000-85527 units capsule delayed-release particles capsule; Take 1 capsule by mouth 3 (Three) Times a Day With Meals. And two capsules with each snack  Dispense: 210 capsule; Refill: 3    4. Vitamin D deficiency  -     Discontinue: cholecalciferol (VITAMIN D3) 25 MCG (1000 UT) tablet; Take 1 tablet by mouth Daily.  Dispense: 90 tablet; Refill: 3  -     Discontinue: cholecalciferol (VITAMIN D3) 25 MCG (1000 UT) tablet; Take 1 tablet by mouth Daily.  Dispense: 90 tablet; Refill: 3  -     cholecalciferol (VITAMIN D3) 25 MCG (1000 UT) tablet; Take 1 tablet by mouth Daily.  Dispense: 90 tablet; Refill: 3  -     Vitamin D,25-Hydroxy; Future    Other orders  -     Discontinue: vitamin E 400 Units capsule; Take 1 capsule by mouth Daily.  Dispense: 90 capsule; Refill: 3  -     Discontinue: vitamin E 400 Units capsule; Take 1 capsule by mouth Daily.  Dispense: 90 capsule; Refill: 3  -     Discontinue: vitamin E 400 Units capsule; Take 1 capsule by mouth Daily.  Dispense: 90 capsule; Refill: 3  -     vitamin E 400 Units capsule; Take 1 capsule by mouth Daily.  Dispense: 90 capsule; Refill: 3      Annual exam: Discussed healthy diet, exercise, adequate sleep, cancer screening, immunizations and preventative care. Annual eye exam and twice yearly dental cleaning.    New onset type 2 diabetes: HA1C  6.70.  Diagnosis explained in detail to the patient. Referral for additional education. Recheck lab in 6 months.     Cystic fibrosis: Mediation refilled.     Vitamin D deficiency: Restart vitamin D supplement. Check lab in 6 months.     Patient was given instructions and counseling regarding his condition or for health maintenance advice. Please see specific information pulled into the AVS if appropriate.     Follow Up   No follow-ups on file.    DARINEL Paul

## 2023-08-23 ENCOUNTER — OFFICE VISIT (OUTPATIENT)
Dept: INTERNAL MEDICINE | Facility: CLINIC | Age: 28
End: 2023-08-23
Payer: COMMERCIAL

## 2023-08-23 ENCOUNTER — TELEPHONE (OUTPATIENT)
Dept: INTERNAL MEDICINE | Facility: CLINIC | Age: 28
End: 2023-08-23

## 2023-08-23 VITALS
HEART RATE: 102 BPM | HEIGHT: 61 IN | WEIGHT: 99.8 LBS | BODY MASS INDEX: 18.84 KG/M2 | TEMPERATURE: 97.8 F | DIASTOLIC BLOOD PRESSURE: 78 MMHG | OXYGEN SATURATION: 98 % | SYSTOLIC BLOOD PRESSURE: 100 MMHG

## 2023-08-23 DIAGNOSIS — E84.0 CYSTIC FIBROSIS WITH PULMONARY MANIFESTATIONS: ICD-10-CM

## 2023-08-23 DIAGNOSIS — E11.9 NEW ONSET TYPE 2 DIABETES MELLITUS: ICD-10-CM

## 2023-08-23 DIAGNOSIS — Z00.00 ANNUAL PHYSICAL EXAM: Primary | ICD-10-CM

## 2023-08-23 DIAGNOSIS — E55.9 VITAMIN D DEFICIENCY: ICD-10-CM

## 2023-08-23 PROCEDURE — 3008F BODY MASS INDEX DOCD: CPT | Performed by: NURSE PRACTITIONER

## 2023-08-23 PROCEDURE — 3044F HG A1C LEVEL LT 7.0%: CPT | Performed by: NURSE PRACTITIONER

## 2023-08-23 PROCEDURE — 1159F MED LIST DOCD IN RCRD: CPT | Performed by: NURSE PRACTITIONER

## 2023-08-23 PROCEDURE — 1160F RVW MEDS BY RX/DR IN RCRD: CPT | Performed by: NURSE PRACTITIONER

## 2023-08-23 PROCEDURE — 2014F MENTAL STATUS ASSESS: CPT | Performed by: NURSE PRACTITIONER

## 2023-08-23 PROCEDURE — 99395 PREV VISIT EST AGE 18-39: CPT | Performed by: NURSE PRACTITIONER

## 2023-08-23 RX ORDER — PANCRELIPASE 30000; 6000; 19000 [USP'U]/1; [USP'U]/1; [USP'U]/1
6000 CAPSULE, DELAYED RELEASE PELLETS ORAL
Qty: 210 CAPSULE | Refills: 3 | Status: SHIPPED | OUTPATIENT
Start: 2023-08-23

## 2023-08-23 RX ORDER — MELATONIN
1000 DAILY
Qty: 90 TABLET | Refills: 3 | Status: SHIPPED | OUTPATIENT
Start: 2023-08-23 | End: 2023-08-23 | Stop reason: SDUPTHER

## 2023-08-23 RX ORDER — CETIRIZINE HYDROCHLORIDE 10 MG/1
10 TABLET ORAL DAILY
Qty: 90 TABLET | Refills: 3 | Status: SHIPPED | OUTPATIENT
Start: 2023-08-23 | End: 2023-08-23 | Stop reason: SDUPTHER

## 2023-08-23 RX ORDER — PANCRELIPASE 30000; 6000; 19000 [USP'U]/1; [USP'U]/1; [USP'U]/1
6000 CAPSULE, DELAYED RELEASE PELLETS ORAL
Qty: 210 CAPSULE | Refills: 3 | Status: SHIPPED | OUTPATIENT
Start: 2023-08-23 | End: 2023-08-23 | Stop reason: SDUPTHER

## 2023-08-23 RX ORDER — CETIRIZINE HYDROCHLORIDE 10 MG/1
10 TABLET ORAL DAILY
Qty: 90 TABLET | Refills: 3 | Status: SHIPPED | OUTPATIENT
Start: 2023-08-23

## 2023-08-23 RX ORDER — MELATONIN
1000 DAILY
Qty: 90 TABLET | Refills: 3 | Status: SHIPPED | OUTPATIENT
Start: 2023-08-23

## 2023-08-23 NOTE — TELEPHONE ENCOUNTER
Hub staff attempted to follow warm transfer process and was unsuccessful     Caller: [facility] - [place], [place] - [facility] - [phone]  - [phone]    Relationship to patient: Pharmacy    Best call back number: [phone]     Patient is needing: A REPRESENTATIVE FROM [facility] CALLED STATING THAT THEY HAD RECEIVED THREE PRESCRIPTIONS FOR THE PATIENT THAT THEY CAN NOT FILL. SHE STATES THAT THEY ONLY FILL NEBULIZER PRESCRIPTIONS AND THEY RECEIVED PRESCRIPTIONS FOR CREON, ZYRTEC, AND VITAMIN D FOR THIS PATIENT.

## 2023-09-13 ENCOUNTER — EDUCATION (OUTPATIENT)
Dept: DIABETES SERVICES | Facility: HOSPITAL | Age: 28
End: 2023-09-13
Payer: COMMERCIAL

## 2023-09-13 NOTE — PROGRESS NOTES
Initial Visit and Education Plan:  Marcus Jensen 28 y.o. accompanied by sister presented to [facility] for initial self diabetes management education.  Patient states the reason for their visit is due to his primary care asking him to attend this appointment.  Marcus Jensen is referred by Basilia Church APRN.     Ht: 61 inches tall    Wt: 99 pounds stated weight    No Known Allergies     LABS:  Lab Results (most recent)        Latest Reference Range & Units Most Recent   Sodium 136 - 145 mmol/L 138  8/21/23 14:36   Potassium 3.5 - 5.2 mmol/L 3.8  8/21/23 14:36   Chloride 98 - 107 mmol/L 102  8/21/23 14:36   CO2 22.0 - 29.0 mmol/L 26.7  8/21/23 14:36   CO2 22 - 32 mmol/L 31  1/22/20 00:58   Anion Gap 5.0 - 15.0 mmol/L 9.3  8/21/23 14:36   BUN 6 - 20 mg/dL 11  8/21/23 14:36   Creatinine 0.76 - 1.27 mg/dL 0.98  8/21/23 14:36   BUN/Creatinine Ratio 7.0 - 25.0  11.2  8/21/23 14:36   eGFR >60.0 mL/min/1.73 108.4  8/21/23 14:36   Glucose 65 - 99 mg/dL 102 (H)  8/21/23 14:36   Calcium 8.6 - 10.5 mg/dL 9.4  8/21/23 14:36   Magnesium 1.6 - 2.6 mg/dL 2.5  11/12/22 08:41   Phosphorus 2.5 - 4.5 mg/dL 3.8  11/12/22 08:41   Alkaline Phosphatase 39 - 117 U/L 189 (H)  8/21/23 14:36   Total Protein 6.0 - 8.5 g/dL 6.3  8/21/23 14:36   Albumin 3.5 - 5.2 g/dL 3.3 (L)  8/21/23 14:36   Globulin gm/dL 3.0  8/21/23 14:36   A/G Ratio g/dL 1.1  8/21/23 14:36   AST (SGOT) 1 - 40 U/L 18  8/21/23 14:36   ALT (SGPT) 1 - 41 U/L 29  8/21/23 14:36   Total Bilirubin 0.0 - 1.2 mg/dL 0.2  8/21/23 14:36   Hemoglobin A1C 4.80 - 5.60 % 6.70 (H)  8/21/23 14:36   TSH Baseline 0.270 - 4.200 uIU/mL 4.400 (H)  8/21/23 14:36   Free T4 0.93 - 1.70 ng/dL 0.99  8/21/23 14:36   Total Cholesterol 0 - 200 mg/dL 109  8/21/23 14:36   HDL Cholesterol 40 - 60 mg/dL 31 (L)  8/21/23 14:36   LDL Cholesterol  0 - 100 mg/dL 64  8/21/23 14:36   VLDL Cholesterol 5 - 40 mg/dL 14  8/21/23 14:36   Triglycerides 0 - 150 mg/dL 65  8/21/23 14:36    LDL/HDL Ratio  2.10  8/21/23 14:36   (H): Data is abnormally high  (L): Data is abnormally low           Labs reviewed with patient.  Hemoglobin A1c and HDL discussed in length with patient.    Past Medical History:   Diagnosis Date    Cystic fibrosis     Head injury         Past Surgical History:    BRONCHOSCOPY    Procedure: BRONCHOSCOPY WITH BRONCHOALVEOLAR LAVAGE, BRONCHIAL WASHINGS, AIRWAY INSPECTION;  Surgeon: Bright Worley MD;  Location: [facility] ENDOSCOPY;  Service: Pulmonary;  Laterality: N/A;  MUCUS PLUGGING        Social History     Tobacco Use    Smoking status: Never    Smokeless tobacco: Current     Types: Chew   Vaping Use    Vaping Use: Never used   Substance Use Topics    Alcohol use: Not Currently     Comment: About a year    Drug use: Yes     Types: Marijuana        Family History   Problem Relation Age of Onset    Asthma Sister         Education Plan as follows: At least 1      Blood Glucose Monitoring Instructions and Plan:   We reviewed blood glucose testing and ADA recommended blood glucose level for fasting, pre and post meals. Patient demonstrates understanding and importance of testing blood glucose at least 1 time a day; Patient will log BG results. Patient was given written material including blood glucose log.     Initial Nutrition Instructions and Plan:   Patient was instructed and demonstrated reading a food label. Serving size and carbohydrate amounts were emphasized.   45 carbs per meal 3 meals a day  15-30 carbs per snacks 3 snacks per day.   Patient was given written materials..   My Fitness Pal Sis explained and demonstrated to patient.     Medication Instructions and Plan:     Current Outpatient Medications:     albuterol (PROVENTIL) (2.5 MG/3ML) 0.083% nebulizer solution, Take 2.5 mg by nebulization Every 4 (Four) Hours As Needed for Wheezing., Disp: 120 each, Rfl: 5    budesonide (PULMICORT) 0.5 MG/2ML nebulizer solution, Take 2 mL by nebulization 2 (Two) Times a Day., Disp:  60 each, Rfl: 11    cetirizine (zyrTEC) 10 MG tablet, Take 1 tablet by mouth Daily., Disp: 90 tablet, Rfl: 3    cholecalciferol (VITAMIN D3) 25 MCG (1000 UT) tablet, Take 1 tablet by mouth Daily., Disp: 90 tablet, Rfl: 3    formoterol (Perforomist) 20 MCG/2ML nebulizer solution, Take 2 mL by nebulization 2 (Two) Times a Day., Disp: 120 mL, Rfl: 11    pancrelipase, Lip-Prot-Amyl, (Creon) 6000-47515 units capsule delayed-release particles capsule, Take 1 capsule by mouth 3 (Three) Times a Day With Meals. And two capsules with each snack, Disp: 210 capsule, Rfl: 3    revefenacin (YUPELRI) 175 MCG/3ML nebulizer solution, Take 3 mL by nebulization Daily., Disp: 90 mL, Rfl: 5    vitamin E 400 Units capsule, Take 1 capsule by mouth Daily., Disp: 90 capsule, Rfl: 3   Medication list was reviewed with patient. Patient denies questions or concerns regarding current medication therapy. Patient was instructed to continue medications as prescribed.     Exercise:   Patient has been instructed to walk for 10 minutes after each meal initially and build strength and endurance to achieve 30 minutes of sustained exercise per day; recommended patient seek advice from Primary Care Provider due to history of cystic fibrosis prior to beginning any exercise program if other health concerns exist.     Problem solving and reducing risks:   I explained the importance of keeping provider appointments, taking medications as prescribed, having laboratory work performed as ordered by provider and seeking care as soon as possible when complications occur.     Patient Selected Behavioral Goal :  #1 -To take medication as prescribe    Patient Selected Ongoing Support Plan:  Family Member Support          Follow up Instructions and Plan:Patient was encouraged to contact DSME staff with questions and or concerns.  DSME staff contact information was given to patient.  Patient will be contacted when group classes resume.  DSME staff will contact patient  at 3 months and 6 months to evaluate patient's further needs regarding diabetes.      Other: I explained the services that were available at the diabetes management clinic.  I asked him to consider seeing a provider with the clinic due to his extensive health history.    This note will be forwarded to PCP.  BERNARDO CROW-AW, MLDE, [facility]    Start Time: 12: 45  End Time: 1: 45      09/13/2023
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
## 2023-09-19 NOTE — OUTREACH NOTE
Call Center TCM Note    Flowsheet Row Responses   [facility] patient discharged from? [facility]   Does the patient have one of the following disease processes/diagnoses(primary or secondary)? Pneumonia   TCM attempt successful? Yes  [Megan-sister]   Call start time 1427   Call end time 1430   Discharge diagnosis Multifocal pneumonia    Person spoke with today (if not patient) and relationship sister Guzman   Meds reviewed with patient/caregiver? Yes   Is the patient having any side effects they believe may be caused by any medication additions or changes? No   Does the patient have all medications ordered at discharge? Yes   Is the patient taking all medications as directed (includes completed medication regime)? No   What is preventing the patient from taking all medications as directed? Other  [Meds will be ready today]   Comments Hospital d/c f/u appt is on 11/22/22 at 10:15 am    Does the patient have an appointment with their PCP within 7 days of discharge? Yes   Pulse Ox monitoring None   Psychosocial issues? No   Did the patient receive a copy of their discharge instructions? Yes   Nursing interventions Reviewed instructions with patient   What is the patient's perception of their health status since discharge? Improving   Are the patient's immunizations up to date?  --  [unsure]   Is the patient/caregiver able to teach back signs and symptoms of worsening condition: Fever/chills, Shortness of breath, Chest pain   Is the patient/caregiver able to teach back importance of completing antibiotic course of treatment? Yes   TCM call completed? Yes   Call end time 1430   Would this patient benefit from a Referral to Amb Social Work? No   Is the patient interested in additional calls from an ambulatory ?  NOTE:  applies to high risk patients requiring additional follow-up. No          Geri Bowling RN    11/14/2022, 14:31 EST       Phone call to patient and offered to work her in on 9/25/23 at 1pm with 12:45 arrival in [place]. Appointment scheduled. She was appreciative of assistance.     ROSARIO Maier RN

## 2023-09-20 ENCOUNTER — OFFICE VISIT (OUTPATIENT)
Dept: DIABETES SERVICES | Facility: HOSPITAL | Age: 28
End: 2023-09-20
Payer: COMMERCIAL

## 2023-09-20 VITALS
HEIGHT: 61 IN | SYSTOLIC BLOOD PRESSURE: 98 MMHG | WEIGHT: 102.2 LBS | OXYGEN SATURATION: 96 % | DIASTOLIC BLOOD PRESSURE: 48 MMHG | HEART RATE: 97 BPM | BODY MASS INDEX: 19.3 KG/M2

## 2023-09-20 DIAGNOSIS — E84.8 DIABETES MELLITUS RELATED TO CF (CYSTIC FIBROSIS): Primary | ICD-10-CM

## 2023-09-20 DIAGNOSIS — E08.9 DIABETES MELLITUS RELATED TO CF (CYSTIC FIBROSIS): Primary | ICD-10-CM

## 2023-09-20 DIAGNOSIS — E84.0 CYSTIC FIBROSIS WITH PULMONARY MANIFESTATIONS: ICD-10-CM

## 2023-09-20 DIAGNOSIS — E11.65 UNCONTROLLED TYPE 2 DIABETES MELLITUS WITH HYPERGLYCEMIA: ICD-10-CM

## 2023-09-20 LAB — GLUCOSE BLDC GLUCOMTR-MCNC: 308 MG/DL (ref 70–99)

## 2023-09-20 PROCEDURE — G0463 HOSPITAL OUTPT CLINIC VISIT: HCPCS | Performed by: NURSE PRACTITIONER

## 2023-09-20 PROCEDURE — 82948 REAGENT STRIP/BLOOD GLUCOSE: CPT | Performed by: NURSE PRACTITIONER

## 2023-09-20 NOTE — PATIENT INSTRUCTIONS
0944 sensor    Labs have been ordered and you can have them done at any [facility]. You can have these done at any [facility].    A [facility] G7 continuous glucose monitor was applied today.  We will schedule a 2-week follow-up to review your continuous glucose monitor.  Make sure to have your continuous glucose monitor within 20 feet at all times to capture the data.    A referral was placed to the dietitian today.

## 2023-09-22 ENCOUNTER — PATIENT ROUNDING (BHMG ONLY) (OUTPATIENT)
Dept: DIABETES SERVICES | Facility: HOSPITAL | Age: 28
End: 2023-09-22
Payer: COMMERCIAL

## 2023-09-22 NOTE — PROGRESS NOTES
September 22, 2023    Hello, may I speak with Marcus Jensen?    My name is Tiffani Chavarria      I am  with [facility]  [address] [place] [address] [facility] [address] [phone].    Before we get started may I verify your date of birth? 1995    I am calling to officially welcome you to our practice and ask about your recent visit. Is this a good time to talk? No    Left voice mail per script.  Thank you, and have a great day.

## 2023-10-03 NOTE — PROGRESS NOTES
Chief Complaint  Diabetes (Follow up, uses dexcom with  )    Referred By: DARINEL Paul Jose Jensen presents to [facility] for diabetes medication management    History of Present Illness    Visit type:  follow-up  Diabetes type:  Type 2  Current diabetes status/concerns/issues:  Reports he wore the dexcom x 3 days until he knocked it off. States his blood sugar was running as high as 300-460mg/dl. States cereal, rice, potatoes caused his glucose to spike into the 300s.  States he has not met with a dietitian that was scheduled last visit.   Other health concerns: he has a hx of Cystic Fibrosis  Current Diabetes symptoms:    Polyuria: Yes     Polydipsia: Yes     Polyphagia: No   Blurred vision: No   Excessive fatigue: No  Known Diabetes complications:  Neuropathy: Numbness and Tingling; Location: Hands  Renal: None  Eyes: None; Location: N/A; Last Eye Exam:  2017 ; Location: [place] ?  Amputation/Wounds: None  GI: Indigestion  Cardiovascular: None  ED: N/A  Other: None  Hypoglycemia:  None reported at this time  Hypoglycemia Symptoms:  No hypoglycemia at this time  Current diabetes treatment: None  Blood glucose device:  Dexcom CGM  Blood glucose monitoring frequency:  Continuous per CGM  Blood glucose range/average:  The 14-day sensor report shows an average glucose of 176 mg/dL, with 64% in target range ( mgdL), 25% in the high range (181-250 mg/dL), 11% in the very high range (>250 mg/dL), 0% in the low range (54-70 mg/dL) and 0% in the very low range (<54 mg/dL).   Glucose Source: Device Reviewed  Dietary behavior:  Number of meals each day - 2; Number of snacks each day - 1  Activity/Exercise:   reports he walks alot    Past Medical History:   Diagnosis Date    Cystic fibrosis     Head injury      Past Surgical History:   Procedure Laterality Date    BRONCHOSCOPY N/A 11/10/2022    Procedure: BRONCHOSCOPY WITH BRONCHOALVEOLAR  LAVAGE, BRONCHIAL WASHINGS, AIRWAY INSPECTION;  Surgeon: Bright Worley MD;  Location: [facility] ENDOSCOPY;  Service: Pulmonary;  Laterality: N/A;  MUCUS PLUGGING     Family History   Problem Relation Age of Onset    Asthma Sister      Social History     Socioeconomic History    Marital status:    Tobacco Use    Smoking status: Never    Smokeless tobacco: Current     Types: Chew   Vaping Use    Vaping Use: Never used   Substance and Sexual Activity    Alcohol use: Not Currently     Comment: About a year    Drug use: Yes     Types: Marijuana    Sexual activity: Not Currently     No Known Allergies    Current Outpatient Medications:     albuterol (PROVENTIL) (2.5 MG/3ML) 0.083% nebulizer solution, Take 2.5 mg by nebulization Every 4 (Four) Hours As Needed for Wheezing., Disp: 120 each, Rfl: 5    budesonide (PULMICORT) 0.5 MG/2ML nebulizer solution, Take 2 mL by nebulization 2 (Two) Times a Day., Disp: 60 each, Rfl: 11    formoterol (Perforomist) 20 MCG/2ML nebulizer solution, Take 2 mL by nebulization 2 (Two) Times a Day., Disp: 120 mL, Rfl: 11    pancrelipase, Lip-Prot-Amyl, (Creon) 6000-01399 units capsule delayed-release particles capsule, Take 1 capsule by mouth 3 (Three) Times a Day With Meals. And two capsules with each snack, Disp: 210 capsule, Rfl: 3    revefenacin (YUPELRI) 175 MCG/3ML nebulizer solution, Take 3 mL by nebulization Daily., Disp: 90 mL, Rfl: 5    vitamin E 400 Units capsule, Take 1 capsule by mouth Daily., Disp: 90 capsule, Rfl: 3    cetirizine (zyrTEC) 10 MG tablet, Take 1 tablet by mouth Daily. (Patient not taking: Reported on 9/20/2023), Disp: 90 tablet, Rfl: 3    cholecalciferol (VITAMIN D3) 25 MCG (1000 UT) tablet, Take 1 tablet by mouth Daily. (Patient not taking: Reported on 9/20/2023), Disp: 90 tablet, Rfl: 3    Objective     Vitals:    10/04/23 1104   BP: 104/70   BP Location: Left arm   Patient Position: Sitting   Cuff Size: Adult   Pulse: 96   SpO2: 97%   Weight: 48.3 kg (106 lb  "6.4 oz)   Height: 154.9 cm (61\")     Body mass index is 20.1 kg/m².    Physical Exam  Constitutional:       Appearance: Normal appearance. He is normal weight.   HENT:      Head: Normocephalic and atraumatic.      Right Ear: External ear normal.      Left Ear: External ear normal.      Nose: Nose normal.   Eyes:      Extraocular Movements: Extraocular movements intact.      Conjunctiva/sclera: Conjunctivae normal.   Pulmonary:      Effort: Pulmonary effort is normal.   Musculoskeletal:         General: Normal range of motion.      Cervical back: Normal range of motion.   Skin:     General: Skin is warm and dry.   Neurological:      General: No focal deficit present.      Mental Status: He is alert and oriented to person, place, and time. Mental status is at baseline.   Psychiatric:         Mood and Affect: Mood normal.         Behavior: Behavior normal.         Thought Content: Thought content normal.         Judgment: Judgment normal.           Result Review :   The following data was reviewed by: DARINEL Foreman on 10/04/2023:    Most Recent A1C          8/21/2023    14:36   HGBA1C Most Recent   Hemoglobin A1C 6.70        A1C Last 3 Results          8/21/2023    14:36   HGBA1C Last 3 Results   Hemoglobin A1C 6.70      A1c collected on 8/21/2023  is 6.7%, indicating Controlled Type II diabetes.      Glucose   Date Value Ref Range Status   10/04/2023 97 70 - 99 mg/dL Final     Comment:     Serial Number: [id number]:  [id number]     Point of care glucose in the office today is within normal limits for nonfasting glucose    Creatinine   Date Value Ref Range Status   08/21/2023 0.98 0.76 - 1.27 mg/dL Final   11/12/2022 0.60 (L) 0.76 - 1.27 mg/dL Final     eGFR   Date Value Ref Range Status   08/21/2023 108.4 >60.0 mL/min/1.73 Final   11/12/2022 136.5 >60.0 mL/min/1.73 Final     Comment:     National Kidney Foundation and American Society of Nephrology (ASN) Task Force recommended calculation based on " the Chronic Kidney Disease Epidemiology Collaboration (CKD-EPI) equation refit without adjustment for race.     Labs collected on 8/21/2023 show Normal values      Total Cholesterol   Date Value Ref Range Status   08/21/2023 109 0 - 200 mg/dL Final     Triglycerides   Date Value Ref Range Status   08/21/2023 65 0 - 150 mg/dL Final     HDL Cholesterol   Date Value Ref Range Status   08/21/2023 31 (L) 40 - 60 mg/dL Final     LDL Cholesterol    Date Value Ref Range Status   08/21/2023 64 0 - 100 mg/dL Final     Lipid panel collected on 8/21/2023 shows low HDL            Assessment: Patient is here today for 2-week follow-up on his diabetes.  Last visit a Dexcom G7 was applied in the office for closer monitoring of his glucose levels.  Patient reports he wore the Dexcom for 3 days until he accidentally knocked it off.  States there were times his glucose was in the 300s.  Admits his highest glucose level was 460 mg/dL.  Admits cereal, potatoes and rice are triggers for hyperglycemia.  He is also drinking Mountain Dew and chocolate milk daily.  States he is living with a friend and he has no other option than to eat what is available.  He is not currently working so he does not have an income at this time.  States he has tried getting disability several times for cystic fibrosis but has been turned down.  Last visit labs were ordered and he forgot to have them done.  Reviewed CGM report with patient and sister in the office today.  There are only 3 days of data due to the sensor falling off early.  The 3-day sensor report shows an average glucose of 176 mg/dL, with 64% in target range ( mgdL), 25% in the high range (181-250 mg/dL), 11% in the very high range (>250 mg/dL), 0% in the low range (54-70 mg/dL) and 0% in the very low range (<54 mg/dL).  His A1c on 8/21/2023 was 6.7% indicating controlled type 2 diabetes.      Diagnoses and all orders for this visit:    1. Diabetes mellitus related to CF (cystic fibrosis)  (Primary)    2. Uncontrolled type 2 diabetes mellitus with hyperglycemia    3. Type 2 diabetes mellitus with diabetic neuropathy, without long-term current use of insulin    Other orders  -     POC Glucose        Plan: Schedule an appointment with a dietitian to discuss low-carb low sugar diet with patient.  Discussed avoidance of sugary drinks such as Mountain Dew and chocolate milk.  Discussed decreasing intake of white bread, white pasta, white rice, white potatoes and sugary snacks.  He has a twin sister who is with him and his appointment today and she is willing to purchase low-carb low sugar food options to help with compliance with a diabetic diet.  Reminded patient to have his labs drawn prior to his next appointment so we can discuss her plan going forward.  Placed another Dexcom G7 to the right posterior arm we will review this at his 2-week follow-up.      He patient will monitor his blood glucose levels per CGM.  If he develops problematic hyperglycemia or hypoglycemia or adverse drug reactions, he will contact the office for further instructions.        Follow Up     Return in about 2 years (around 10/4/2025) for CGM Follow Up, Medication Mgmt.    Patient was given instructions and counseling regarding his condition or for health maintenance advice. Please see specific information pulled into the AVS if appropriate.     Faviola Grewal, APRN  10/04/2023      Dictated Utilizing Dragon Dictation.  Please note that portions of this note were completed with a voice recognition program.  Part of this note may be an electronic transcription/translation of spoken language to printed text using the Dragon Dictation System.

## 2023-10-04 ENCOUNTER — OFFICE VISIT (OUTPATIENT)
Dept: DIABETES SERVICES | Facility: HOSPITAL | Age: 28
End: 2023-10-04
Payer: COMMERCIAL

## 2023-10-04 VITALS
OXYGEN SATURATION: 97 % | DIASTOLIC BLOOD PRESSURE: 70 MMHG | SYSTOLIC BLOOD PRESSURE: 104 MMHG | HEART RATE: 96 BPM | BODY MASS INDEX: 20.09 KG/M2 | WEIGHT: 106.4 LBS | HEIGHT: 61 IN

## 2023-10-04 DIAGNOSIS — E08.9 DIABETES MELLITUS RELATED TO CF (CYSTIC FIBROSIS): Primary | ICD-10-CM

## 2023-10-04 DIAGNOSIS — E84.8 DIABETES MELLITUS RELATED TO CF (CYSTIC FIBROSIS): Primary | ICD-10-CM

## 2023-10-04 DIAGNOSIS — E11.65 UNCONTROLLED TYPE 2 DIABETES MELLITUS WITH HYPERGLYCEMIA: ICD-10-CM

## 2023-10-04 DIAGNOSIS — E11.40 TYPE 2 DIABETES MELLITUS WITH DIABETIC NEUROPATHY, WITHOUT LONG-TERM CURRENT USE OF INSULIN: ICD-10-CM

## 2023-10-04 LAB — GLUCOSE BLDC GLUCOMTR-MCNC: 97 MG/DL (ref 70–99)

## 2023-10-04 PROCEDURE — G0463 HOSPITAL OUTPT CLINIC VISIT: HCPCS | Performed by: NURSE PRACTITIONER

## 2023-10-04 PROCEDURE — 82948 REAGENT STRIP/BLOOD GLUCOSE: CPT | Performed by: NURSE PRACTITIONER

## 2023-10-04 NOTE — PATIENT INSTRUCTIONS
Schedule an appointment with a dietitian prior to leaving today    A Dexcom G7 was applied to the right posterior arm we will review your data in 2 weeks    Please have the labs drawn that were ordered.  These need to be fasting labs.    Try to watch your intake of white bread, white rice, white potatoes, white pasta-switch to wheat pasta, wheat bread, red potatoes. Avoid sugary drinks-switch to zero or diet drinks or water. Avoid sugary snacks

## 2023-10-05 ENCOUNTER — LAB (OUTPATIENT)
Dept: LAB | Facility: HOSPITAL | Age: 28
End: 2023-10-05
Payer: COMMERCIAL

## 2023-10-05 DIAGNOSIS — E84.8 DIABETES MELLITUS RELATED TO CF (CYSTIC FIBROSIS): ICD-10-CM

## 2023-10-05 DIAGNOSIS — E08.9 DIABETES MELLITUS RELATED TO CF (CYSTIC FIBROSIS): ICD-10-CM

## 2023-10-05 LAB
ALBUMIN UR-MCNC: <1.2 MG/DL
CREAT UR-MCNC: 50.4 MG/DL
GLUCOSE P FAST SERPL-MCNC: 90 MG/DL (ref 74–106)
MICROALBUMIN/CREAT UR: NORMAL MG/G{CREAT}

## 2023-10-05 PROCEDURE — 82947 ASSAY GLUCOSE BLOOD QUANT: CPT

## 2023-10-05 PROCEDURE — 86341 ISLET CELL ANTIBODY: CPT

## 2023-10-05 PROCEDURE — 82043 UR ALBUMIN QUANTITATIVE: CPT

## 2023-10-05 PROCEDURE — 86337 INSULIN ANTIBODIES: CPT

## 2023-10-05 PROCEDURE — 82570 ASSAY OF URINE CREATININE: CPT

## 2023-10-05 PROCEDURE — 36415 COLL VENOUS BLD VENIPUNCTURE: CPT

## 2023-10-05 PROCEDURE — 83525 ASSAY OF INSULIN: CPT

## 2023-10-05 PROCEDURE — 84681 ASSAY OF C-PEPTIDE: CPT

## 2023-10-06 LAB — C PEPTIDE SERPL-MCNC: 1.6 NG/ML (ref 1.1–4.4)

## 2023-10-07 LAB — GAD65 AB SER IA-ACNC: <5 U/ML (ref 0–5)

## 2023-10-11 LAB — INSULIN SERPL-ACNC: 1.8 UIU/ML

## 2023-10-14 LAB
INSULIN AB SER-ACNC: <5 UU/ML
ISLET CELL512 AB SER-ACNC: <7.5 U/ML
ZNT8 AB SERPL IA-ACNC: <15 U/ML

## 2023-10-25 ENCOUNTER — APPOINTMENT (OUTPATIENT)
Dept: DIABETES SERVICES | Facility: HOSPITAL | Age: 28
End: 2023-10-25
Payer: COMMERCIAL

## 2023-10-25 ENCOUNTER — OFFICE VISIT (OUTPATIENT)
Dept: DIABETES SERVICES | Facility: HOSPITAL | Age: 28
End: 2023-10-25
Payer: COMMERCIAL

## 2023-10-25 VITALS
OXYGEN SATURATION: 97 % | WEIGHT: 108 LBS | DIASTOLIC BLOOD PRESSURE: 60 MMHG | SYSTOLIC BLOOD PRESSURE: 104 MMHG | HEART RATE: 80 BPM | BODY MASS INDEX: 20.41 KG/M2

## 2023-10-25 DIAGNOSIS — E84.8 DIABETES MELLITUS RELATED TO CYSTIC FIBROSIS: Primary | ICD-10-CM

## 2023-10-25 DIAGNOSIS — E08.9 DIABETES MELLITUS RELATED TO CYSTIC FIBROSIS: Primary | ICD-10-CM

## 2023-10-25 DIAGNOSIS — G62.9 NEUROPATHY: ICD-10-CM

## 2023-10-25 DIAGNOSIS — E84.8 DIABETES MELLITUS RELATED TO CF (CYSTIC FIBROSIS): Primary | ICD-10-CM

## 2023-10-25 DIAGNOSIS — R73.03 PREDIABETES: ICD-10-CM

## 2023-10-25 DIAGNOSIS — E08.9 DIABETES MELLITUS RELATED TO CF (CYSTIC FIBROSIS): Primary | ICD-10-CM

## 2023-10-25 LAB
EXPIRATION DATE: ABNORMAL
GLUCOSE BLDC GLUCOMTR-MCNC: 90 MG/DL (ref 70–99)
HBA1C MFR BLD: 6.2 % (ref 4.5–5.7)
Lab: ABNORMAL

## 2023-10-25 PROCEDURE — 3044F HG A1C LEVEL LT 7.0%: CPT | Performed by: NURSE PRACTITIONER

## 2023-10-25 PROCEDURE — 97802 MEDICAL NUTRITION INDIV IN: CPT | Performed by: DIETITIAN, REGISTERED

## 2023-10-25 PROCEDURE — 82948 REAGENT STRIP/BLOOD GLUCOSE: CPT | Performed by: NURSE PRACTITIONER

## 2023-10-25 PROCEDURE — 1159F MED LIST DOCD IN RCRD: CPT | Performed by: NURSE PRACTITIONER

## 2023-10-25 PROCEDURE — 1160F RVW MEDS BY RX/DR IN RCRD: CPT | Performed by: NURSE PRACTITIONER

## 2023-10-25 PROCEDURE — 95251 CONT GLUC MNTR ANALYSIS I&R: CPT | Performed by: NURSE PRACTITIONER

## 2023-10-25 PROCEDURE — 99213 OFFICE O/P EST LOW 20 MIN: CPT | Performed by: NURSE PRACTITIONER

## 2023-10-25 RX ORDER — ACYCLOVIR 400 MG/1
1 TABLET ORAL
Qty: 3 EACH | Refills: 5 | Status: SHIPPED | OUTPATIENT
Start: 2023-10-25

## 2023-10-25 NOTE — PATIENT INSTRUCTIONS
Dexcom G7 sensor were sent to [facility]    Decrease your sugar intake and increase your water intake. If you are going to drink soda recommend zero sugar soda or diet soda.

## 2023-10-25 NOTE — PROGRESS NOTES
Chief Complaint  Diabetes (Follow up, uses device, hasn't used in a week )    Referred By: No ref. provider found    Subjective          Marcus Jose Jensen presents to [facility] for diabetes medication management    History of Present Illness    Visit type:  follow-up  Diabetes type:  Type 2  Current diabetes status/concerns/issues:  He met with the dietician prior to his appointment today. States he has not changed his diet-his is still drinking at least 6 Mountain Dews daily and 1/2 gallon whole milk daily. States he lives with a friend so he is at the mercy of his friend what foods are in the home. States he is not workind and has no insome. He has not been checking his glucose levels for about a wk. He was wearing a dexcom-states it came off in wk .   Other health concerns: he has Cystic Fibrosis-he is currently takes Creon for his Cystic Fibrosis. [facility] he has not used  albuterol, pulmicort. Yupelri, and perforomist for the pst 2 wks  Current Diabetes symptoms:    Polyuria: No   Polydipsia: No   Polyphagia: No   Blurred vision: No   Excessive fatigue: Yes    Known Diabetes complications:  Neuropathy: Numbness and Tingling; Location: Hands  Renal: None  Eyes: None; Location: N/A; Last Eye Exam:  2017 ; Location: [place] ?  Amputation/Wounds: None  GI: Indigestion  Cardiovascular: None  ED: N/A  Other: None  Hypoglycemia:  None reported at this time  Hypoglycemia Symptoms:  No hypoglycemia at this time  Current diabetes treatment:  none  Blood glucose device:  Dexcom CGM  Blood glucose monitoring frequency:  Continuous per CGM  Blood glucose range/average:  The 14-day sensor report shows an average glucose of 144mg/dL, with 80% in target range ( mgdL), 15% in the high range (181-250 mg/dL), 4% in the very high range (>250 mg/dL), <1% in the low range (54-70 mg/dL) and 0% in the very low range (<54 mg/dL).   Glucose Source: Device Reviewed  Dietary behavior:  Number of meals  each day - 2; Number of snacks each day - 1  Activity/Exercise:   reports he walks alot    Past Medical History:   Diagnosis Date    Cystic fibrosis     Head injury      Past Surgical History:   Procedure Laterality Date    BRONCHOSCOPY N/A 11/10/2022    Procedure: BRONCHOSCOPY WITH BRONCHOALVEOLAR LAVAGE, BRONCHIAL WASHINGS, AIRWAY INSPECTION;  Surgeon: Bright Worley MD;  Location: [facility] ENDOSCOPY;  Service: Pulmonary;  Laterality: N/A;  MUCUS PLUGGING     Family History   Problem Relation Age of Onset    Asthma Sister      Social History     Socioeconomic History    Marital status:    Tobacco Use    Smoking status: Never    Smokeless tobacco: Current     Types: Chew   Vaping Use    Vaping Use: Never used   Substance and Sexual Activity    Alcohol use: Not Currently     Comment: About a year    Drug use: Yes     Types: Marijuana    Sexual activity: Not Currently     No Known Allergies    Current Outpatient Medications:     albuterol (PROVENTIL) (2.5 MG/3ML) 0.083% nebulizer solution, Take 2.5 mg by nebulization Every 4 (Four) Hours As Needed for Wheezing., Disp: 120 each, Rfl: 5    budesonide (PULMICORT) 0.5 MG/2ML nebulizer solution, Take 2 mL by nebulization 2 (Two) Times a Day., Disp: 60 each, Rfl: 11    formoterol (Perforomist) 20 MCG/2ML nebulizer solution, Take 2 mL by nebulization 2 (Two) Times a Day., Disp: 120 mL, Rfl: 11    pancrelipase, Lip-Prot-Amyl, (Creon) 6000-83725 units capsule delayed-release particles capsule, Take 1 capsule by mouth 3 (Three) Times a Day With Meals. And two capsules with each snack, Disp: 210 capsule, Rfl: 3    revefenacin (YUPELRI) 175 MCG/3ML nebulizer solution, Take 3 mL by nebulization Daily., Disp: 90 mL, Rfl: 5    vitamin E 400 Units capsule, Take 1 capsule by mouth Daily., Disp: 90 capsule, Rfl: 3    cetirizine (zyrTEC) 10 MG tablet, Take 1 tablet by mouth Daily. (Patient not taking: Reported on 9/20/2023), Disp: 90 tablet, Rfl: 3    cholecalciferol (VITAMIN D3)  25 MCG (1000 UT) tablet, Take 1 tablet by mouth Daily. (Patient not taking: Reported on 9/20/2023), Disp: 90 tablet, Rfl: 3    Continuous Blood Gluc Sensor (Dexcom G7 Sensor) misc, Use 1 each Every 10 (Ten) Days., Disp: 3 each, Rfl: 5    Objective     Vitals:    10/25/23 1505   BP: 104/60   BP Location: Left arm   Patient Position: Sitting   Cuff Size: Adult   Pulse: 80   SpO2: 97%   Weight: 49 kg (108 lb)     Body mass index is 20.41 kg/m².    Physical Exam  Constitutional:       Appearance: Normal appearance. He is normal weight.   HENT:      Head: Normocephalic and atraumatic.      Right Ear: External ear normal.      Left Ear: External ear normal.      Nose: Nose normal.   Eyes:      Extraocular Movements: Extraocular movements intact.      Conjunctiva/sclera: Conjunctivae normal.   Pulmonary:      Effort: Pulmonary effort is normal.   Musculoskeletal:         General: Normal range of motion.      Cervical back: Normal range of motion.   Skin:     General: Skin is warm and dry.   Neurological:      General: No focal deficit present.      Mental Status: He is alert and oriented to person, place, and time. Mental status is at baseline.   Psychiatric:         Mood and Affect: Mood normal.         Behavior: Behavior normal.         Thought Content: Thought content normal.         Judgment: Judgment normal.             Result Review :   The following data was reviewed by: DARINEL Foreman on 10/25/2023:    Most Recent A1C          10/25/2023    15:15   HGBA1C Most Recent   Hemoglobin A1C 6.2        A1C Last 3 Results          8/21/2023    14:36 10/25/2023    15:15   HGBA1C Last 3 Results   Hemoglobin A1C 6.70  6.2      A1c collected in the office today is 6.2%, indicating Controlled  prediabetes.  This result is down from the prior result of 6.7% collected on 8/21/23     Glucose   Date Value Ref Range Status   10/25/2023 90 70 - 99 mg/dL Final     Comment:     Serial Number: [id number]:  [id number]      Point of care glucose in the office today is within normal limits for nonfasting glucose    Creatinine   Date Value Ref Range Status   08/21/2023 0.98 0.76 - 1.27 mg/dL Final   11/12/2022 0.60 (L) 0.76 - 1.27 mg/dL Final     eGFR   Date Value Ref Range Status   08/21/2023 108.4 >60.0 mL/min/1.73 Final   11/12/2022 136.5 >60.0 mL/min/1.73 Final     Comment:     National Kidney Foundation and American Society of Nephrology (ASN) Task Force recommended calculation based on the Chronic Kidney Disease Epidemiology Collaboration (CKD-EPI) equation refit without adjustment for race.     Labs collected on 8/21/23 show Normal values    Microalbumin, Urine   Date Value Ref Range Status   10/05/2023 <1.2 mg/dL Final     Creatinine, Urine   Date Value Ref Range Status   10/05/2023 50.4 mg/dL Final     Microalbumin/Creatinine Ratio   Date Value Ref Range Status   10/05/2023   Final     Comment:     Unable to calculate     Urine microalbuminuria collected on 10/5/2023 is negative for microalbuminuria    Total Cholesterol   Date Value Ref Range Status   08/21/2023 109 0 - 200 mg/dL Final     Triglycerides   Date Value Ref Range Status   08/21/2023 65 0 - 150 mg/dL Final     HDL Cholesterol   Date Value Ref Range Status   08/21/2023 31 (L) 40 - 60 mg/dL Final     LDL Cholesterol    Date Value Ref Range Status   08/21/2023 64 0 - 100 mg/dL Final     Lipid panel collected on 8/21/23 shows low HDL            Assessment: Pt is here today for a 3 wk follow up. He admits he has not changed his diet. Admits to drinking 6 Mountain Dews daily and 1/2 gallon milk.  States he lives with a friend so he is at the mercy of his friend what foods are in the home. States he is not working and has no income to buy foods. He has not been checking his glucose levels for about a wk due to his dexcom falling off. He met with the dietician earlier today and she was concerned with his weight loss so she encouraged him not to limit his calories.  Reviewed CGM report with pt in the office today. The 14-day sensor report shows an average glucose of 144mg/dL, with 80% in target range ( mgdL), 15% in the high range (181-250 mg/dL), 4% in the very high range (>250 mg/dL), <1% in the low range (54-70 mg/dL) and 0% in the very low range (<54 mg/dL). His A1c in the office today is 6.3% which is down from his previous A1c of 6.7%. Reviewed chart and he has been prediabetic since 2022. His A1c in 8/23 revealed diabetes however he is now back in the prediabetes range. In addition, he has Cystic Fibrosis which may have induced his diabetes. He is currently on Creon daily and nebulizers as needed.         Diagnoses and all orders for this visit:    1. Diabetes mellitus related to CF (cystic fibrosis) (Primary)  -     POC Glycosylated Hemoglobin (Hb A1C)  -     Continuous Blood Gluc Sensor (Dexcom G7 Sensor) misc; Use 1 each Every 10 (Ten) Days.  Dispense: 3 each; Refill: 5    2. Prediabetes    3. Neuropathy    Other orders  -     POC Glucose        Plan: No medication was added to his plan of care at this time. Discussed working on diet and decreasing sugary drinks. Instructed pt to decreased Mountain Dew intake and increase water intake. Discussed initially starting off by alternating Mountain Dew and water so he will decrease to 3 Mountain Dews daily and increase his water intake to 3 bottles daily. Discussed Mountain Dew Zero as a sugar free option as well. Also instructed to decrease intake of milk and sugary foods. Scheduled a 3 month follow up and we will recheck his A1c at that time.     The patient will monitor his blood glucose levels per CGM.  If he develops problematic hyperglycemia or hypoglycemia or adverse drug reactions, he will contact the office for further instructions.        Follow Up     Return in about 3 months (around 1/25/2024) for CGM Follow Up, Medication Mgmt.    Patient was given instructions and counseling regarding his condition or for  health maintenance advice. Please see specific information pulled into the AVS if appropriate.     Faviola Grewal, APRN  10/25/2023      Dictated Utilizing Dragon Dictation.  Please note that portions of this note were completed with a voice recognition program.  Part of this note may be an electronic transcription/translation of spoken language to printed text using the Dragon Dictation System.

## 2023-11-20 NOTE — PROGRESS NOTES
Primary Care Provider  Basilia Church APRN   Referring Provider  No ref. provider found    Patient Complaint  Follow-up and Cough      SUBJECTIVE    History of Presenting Illness  Marcus Jensen is a pleasant 28 y.o. male who presents to [facility] for follow-up appointment.  I last saw the patient 8/21/2023. Mr. Jensen  has a diagnosis of cystic fibrosis from childhood and was followed at [facility] cystic fibrosis clinic until several years back when he was transition to adult clinic.  He has a history of having MSSA pneumonia and acute hypoxic respiratory failure in 2019.  His last pulmonary function test was in 2019. And his last CT scan of his chest was 1/15/2020.  Patient has had several CT scans ordered but has not been compliant in getting them done.  Patient has had no recent hospitalizations, no recent diagnostics on his lungs.  He is under the care of diabetes provider.  He continues to be on Creon which is prescribed by his PCP.    Denies dyspnea, coughing, wheezing, headaches, chest pain, weight loss or hemoptysis. Denies fevers, chills and night sweats. Marcus Jensen is able to perform ADLs without difficulties and denies any swollen glands/lymph nodes in the head or neck.    I have personally reviewed the review of systems, past family, social, medical and surgical histories; and agree with their findings.    Review of Systems  Constitutional symptoms:  Denied complaints   Ear, nose, throat: Denied complaints  Cardiovascular:  Denied complaints  Respiratory: Denied complaints  Gastrointestinal: Denied complaints  Musculoskeletal: Denied complaints    Family History   Problem Relation Age of Onset    Asthma Sister         Social History     Socioeconomic History    Marital status:    Tobacco Use    Smoking status: Never    Smokeless tobacco: Current     Types: Chew   Vaping Use    Vaping Use: Never used   Substance and Sexual  Activity    Alcohol use: Not Currently     Comment: About a year    Drug use: Yes     Types: Marijuana    Sexual activity: Not Currently        Past Medical History:   Diagnosis Date    Cystic fibrosis     Head injury         Immunization History   Administered Date(s) Administered    DTaP, Unspecified 06/12/1997, 01/28/1998, 08/08/2000    Hep B, Adolescent or Pediatric 01/28/1998    HiB 06/12/1997, 01/28/1998    IPV 06/12/1997, 08/08/2000    MMR 08/08/2000, 10/09/2000    Meningococcal Polysaccharide 08/05/2009    Tdap 08/05/2009       No Known Allergies       Current Outpatient Medications:     albuterol (PROVENTIL) (2.5 MG/3ML) 0.083% nebulizer solution, Take 2.5 mg by nebulization Every 4 (Four) Hours As Needed for Wheezing., Disp: 120 each, Rfl: 5    budesonide (PULMICORT) 0.5 MG/2ML nebulizer solution, Take 2 mL by nebulization 2 (Two) Times a Day., Disp: 60 each, Rfl: 11    formoterol (Perforomist) 20 MCG/2ML nebulizer solution, Take 2 mL by nebulization 2 (Two) Times a Day., Disp: 120 mL, Rfl: 11    pancrelipase, Lip-Prot-Amyl, (Creon) 6000-22441 units capsule delayed-release particles capsule, Take 1 capsule by mouth 3 (Three) Times a Day With Meals. And two capsules with each snack, Disp: 210 capsule, Rfl: 3    revefenacin (YUPELRI) 175 MCG/3ML nebulizer solution, Take 3 mL by nebulization Daily., Disp: 90 mL, Rfl: 5    vitamin E 400 Units capsule, Take 1 capsule by mouth Daily., Disp: 90 capsule, Rfl: 3    cetirizine (zyrTEC) 10 MG tablet, Take 1 tablet by mouth Daily. (Patient not taking: Reported on 9/20/2023), Disp: 90 tablet, Rfl: 3    cholecalciferol (VITAMIN D3) 25 MCG (1000 UT) tablet, Take 1 tablet by mouth Daily. (Patient not taking: Reported on 9/20/2023), Disp: 90 tablet, Rfl: 3    Continuous Blood Gluc Sensor (Dexcom G7 Sensor) misc, Use 1 each Every 10 (Ten) Days. (Patient not taking: Reported on 11/21/2023), Disp: 3 each, Rfl: 5           Vital Signs   /63 (BP Location: Right arm,  "Patient Position: Sitting, Cuff Size: Adult)   Pulse 87   Temp 97.8 °F (36.6 °C) (Temporal)   Resp 16   Ht 154.9 cm (61\")   Wt 47 kg (103 lb 9.6 oz)   SpO2 98% Comment: room air  BMI 19.58 kg/m²       OBJECTIVE    Physical Exam  Vital Signs Reviewed   WDWN, Alert, NAD.    HEENT:  PERRL, EOMI.  OP, nares clear  Neck:  Supple, no JVD, no thyromegaly  Chest:  good aeration, clear to auscultation bilaterally, tympanic to percussion bilaterally, no work of breathing noted  CV: RRR, no MGR, pulses 2+, equal.  Abd:  Soft, NT, ND, + BS, no HSM  EXT:  no clubbing, no cyanosis, no edema  Neuro:  A&Ox3, CN grossly intact, no focal deficits.  Skin: No rashes or lesions noted    Results Review  I have personally reviewed the prior office notes, hospital records, labs, and diagnostics.    ASSESSMENT         Patient Active Problem List   Diagnosis    Cystic fibrosis with pulmonary manifestations    Bronchiectasis without acute exacerbation    Sepsis, due to unspecified organism, unspecified whether acute organ dysfunction present    Acute cough    Bronchitis    Multifocal pneumonia       Encounter Diagnoses   Name Primary?    Cystic fibrosis with pulmonary manifestations Yes    Bronchiectasis without acute exacerbation     Poor compliance with medication     Tobacco chew use     Marijuana abuse       PLAN  -patient currently with no breathing problems, takes no medications for his lungs  -to followup in 6 months or sooner if needed  -discussed vaccination, patient declines    Diagnoses and all orders for this visit:    1. Cystic fibrosis with pulmonary manifestations (Primary)    2. Bronchiectasis without acute exacerbation    3. Poor compliance with medication    4. Tobacco chew use    5. Marijuana abuse       Smoking status: Chews tobacco and smokes marijuana  Vaccination status: Reviewed and declines  Medications personally reviewed    Follow Up  Return in about 6 months (around 5/21/2024).    Patient was given " instructions and counseling regarding his condition or for health maintenance advice. Please see specific information pulled into the AVS if appropriate.

## 2023-11-21 ENCOUNTER — OFFICE VISIT (OUTPATIENT)
Dept: PULMONOLOGY | Facility: CLINIC | Age: 28
End: 2023-11-21
Payer: COMMERCIAL

## 2023-11-21 VITALS
HEART RATE: 87 BPM | TEMPERATURE: 97.8 F | BODY MASS INDEX: 19.56 KG/M2 | RESPIRATION RATE: 16 BRPM | SYSTOLIC BLOOD PRESSURE: 106 MMHG | HEIGHT: 61 IN | WEIGHT: 103.6 LBS | DIASTOLIC BLOOD PRESSURE: 63 MMHG | OXYGEN SATURATION: 98 %

## 2023-11-21 DIAGNOSIS — E84.0 CYSTIC FIBROSIS WITH PULMONARY MANIFESTATIONS: Primary | ICD-10-CM

## 2023-11-21 DIAGNOSIS — Z72.0 TOBACCO CHEW USE: ICD-10-CM

## 2023-11-21 DIAGNOSIS — F12.10 MARIJUANA ABUSE: ICD-10-CM

## 2023-11-21 DIAGNOSIS — Z91.148 POOR COMPLIANCE WITH MEDICATION: ICD-10-CM

## 2023-11-21 DIAGNOSIS — J47.9 BRONCHIECTASIS WITHOUT ACUTE EXACERBATION: ICD-10-CM

## 2023-11-21 PROCEDURE — 99214 OFFICE O/P EST MOD 30 MIN: CPT | Performed by: NURSE PRACTITIONER

## 2023-11-21 PROCEDURE — 1159F MED LIST DOCD IN RCRD: CPT | Performed by: NURSE PRACTITIONER

## 2023-11-21 PROCEDURE — 1160F RVW MEDS BY RX/DR IN RCRD: CPT | Performed by: NURSE PRACTITIONER

## 2023-12-18 VITALS — WEIGHT: 105.8 LBS | BODY MASS INDEX: 19.98 KG/M2 | HEIGHT: 61 IN

## 2023-12-18 NOTE — PROGRESS NOTES
"  Marcus Jensen is a 28 y.o. male who presents to [facility] for nutrition consult r/t diagnosis of diabetes related to cystic fibrosis.  Marcus Jensen is referred by DARINEL Christian.    Past Medical History:   Diagnosis Date    Cystic fibrosis     Head injury        Anthropometrics    154.9 cm (61\")  48 kg (105 lb 12.8 oz)  19.99 kg/m²    Wt fluctuations noted since 2021, when pt's weight was 120#.    Diabetes History    Diabetes History  What type of diabetes do you have?: Other (comment) (diabetes r/t cystic fibrosis)  Current DM knowledge: good  Have you had diabetes education/teaching in the past?: no  Do you test your blood sugar at home?: yes  Meter type: Dexcom  Typical readings: pt reports max reading 390    Education Preferences    Education Preferences  What areas of diabetes would you like to learn about?: diet information    Nutrition Information    Nutrition Information  Enter everything you can remember eating in the last 24 hours (1 day): breakfast- cereal; lunch- leftovers; dinner- beef, white rice, vegetables; snacks- pt denies snacking; beverages- regular soda (6 bottles/day), water, whole milk (1 gallon/day)  What is the biggest challenge you have with your diet?: Knowledge  Do any of the following issues affect your eating habits?: food finances    Education Needs    DM Education Needs  Meter: Has own  Healthy Eating: RD consult, Reviewed meal plan, Basic meal plan provided  Motivation: Engaged  Teaching Method: Explanation, Discussion, Handouts  Patient Response: Verbalized understanding, Needs reinforcement    DM Goals    DM Goals  Healthy Eating - Goal: Today  Being Active - Goal: Today  Monitoring - Goal: Today  Contact Plan: Follow-up medical care    Medications    Current Outpatient Medications   Medication    albuterol    budesonide    cetirizine    cholecalciferol    Dexcom G7 Sensor    formoterol    Creon    revefenacin    vitamin E     Labs     "   Lab Results   Component Value Date    CHOL 109 08/21/2023    TRIG 65 08/21/2023    HDL 31 (L) 08/21/2023    LDL 64 08/21/2023     Pt also seeing DARINEL Christian today following visit w/ RD.  In office A1c today was 6.2%, improvement from August A1c at 6.7%.    Nutrition counseling provided on carbohydrate counting, portion control, measuring and reading labels.  Discussed eating out and gave suggestions on controlling carbohydrate intake and making healthier food choices.   Pt has higher calorie needs given dx/o cystic fibrosis and hx/o weight loss.  Nutritional supplement would be beneficial but food finances are an issue.  Pt was living w/ his sister and dependent on whatever food she had available in the house.  It is questionable if she would even be able to afford the supplements for the pt.    Meal Plan:     Total Carbohydrates per meal: 3-4 carb servings/meal, at least 3 meals/day  Lean protein with meals.  Limit added fats.  Snacks: 1 carbohydrate serving (</= 22 g) + 1 protein serving.     Discussed the benefits of exercise in lowering blood glucose, blood pressure, cholesterol, stress and controlling body weight.     Advised to continue daily blood glucose monitoring to assist with understanding of factors affecting blood glucose and assist with management of diabetes.  Discussed and provided with target BG ranges.     Literature provided: Diabetes Nutrition Placemat, Choose Your Foods Booklet    Dietitian contact number provided.  Patient encouraged to call with questions or concerns.     Time spent with patient: 20 minutes    Lita Machado RDN, LD  10/25/2023

## 2024-02-01 ENCOUNTER — APPOINTMENT (OUTPATIENT)
Dept: CT IMAGING | Facility: HOSPITAL | Age: 29
End: 2024-02-01
Payer: COMMERCIAL

## 2024-02-01 ENCOUNTER — HOSPITAL ENCOUNTER (EMERGENCY)
Facility: HOSPITAL | Age: 29
Discharge: HOME | End: 2024-02-01
Attending: EMERGENCY MEDICINE | Admitting: EMERGENCY MEDICINE
Payer: COMMERCIAL

## 2024-02-01 VITALS
WEIGHT: 101.85 LBS | OXYGEN SATURATION: 97 % | BODY MASS INDEX: 19.23 KG/M2 | HEART RATE: 75 BPM | TEMPERATURE: 97.9 F | RESPIRATION RATE: 15 BRPM | SYSTOLIC BLOOD PRESSURE: 100 MMHG | HEIGHT: 61 IN | DIASTOLIC BLOOD PRESSURE: 74 MMHG

## 2024-02-01 DIAGNOSIS — K59.00 CONSTIPATION, UNSPECIFIED CONSTIPATION TYPE: ICD-10-CM

## 2024-02-01 DIAGNOSIS — R10.84 GENERALIZED ABDOMINAL PAIN: Primary | ICD-10-CM

## 2024-02-01 LAB
ALBUMIN SERPL-MCNC: 4 G/DL (ref 3.5–5.2)
ALBUMIN/GLOB SERPL: 1 G/DL
ALP SERPL-CCNC: 228 U/L (ref 39–117)
ALT SERPL W P-5'-P-CCNC: 29 U/L (ref 1–41)
ANION GAP SERPL CALCULATED.3IONS-SCNC: 16 MMOL/L (ref 5–15)
AST SERPL-CCNC: 19 U/L (ref 1–40)
BASOPHILS # BLD AUTO: 0.1 10*3/MM3 (ref 0–0.2)
BASOPHILS NFR BLD AUTO: 0.5 % (ref 0–1.5)
BILIRUB SERPL-MCNC: 0.4 MG/DL (ref 0–1.2)
BUN SERPL-MCNC: 20 MG/DL (ref 6–20)
BUN/CREAT SERPL: 19.8 (ref 7–25)
CALCIUM SPEC-SCNC: 9.3 MG/DL (ref 8.6–10.5)
CHLORIDE SERPL-SCNC: 94 MMOL/L (ref 98–107)
CO2 SERPL-SCNC: 24 MMOL/L (ref 22–29)
CREAT SERPL-MCNC: 1.01 MG/DL (ref 0.76–1.27)
DEPRECATED RDW RBC AUTO: 44.5 FL (ref 37–54)
EGFRCR SERPLBLD CKD-EPI 2021: 104.5 ML/MIN/1.73
EOSINOPHIL # BLD AUTO: 0.16 10*3/MM3 (ref 0–0.4)
EOSINOPHIL NFR BLD AUTO: 0.9 % (ref 0.3–6.2)
ERYTHROCYTE [DISTWIDTH] IN BLOOD BY AUTOMATED COUNT: 13.9 % (ref 12.3–15.4)
GLOBULIN UR ELPH-MCNC: 4 GM/DL
GLUCOSE SERPL-MCNC: 147 MG/DL (ref 65–99)
HCT VFR BLD AUTO: 48 % (ref 37.5–51)
HGB BLD-MCNC: 16.1 G/DL (ref 13–17.7)
HOLD SPECIMEN: NORMAL
HOLD SPECIMEN: NORMAL
IMM GRANULOCYTES # BLD AUTO: 0.07 10*3/MM3 (ref 0–0.05)
IMM GRANULOCYTES NFR BLD AUTO: 0.4 % (ref 0–0.5)
LIPASE SERPL-CCNC: 6 U/L (ref 13–60)
LYMPHOCYTES # BLD AUTO: 2.47 10*3/MM3 (ref 0.7–3.1)
LYMPHOCYTES NFR BLD AUTO: 13.4 % (ref 19.6–45.3)
MCH RBC QN AUTO: 29.5 PG (ref 26.6–33)
MCHC RBC AUTO-ENTMCNC: 33.5 G/DL (ref 31.5–35.7)
MCV RBC AUTO: 88.1 FL (ref 79–97)
MONOCYTES # BLD AUTO: 1.25 10*3/MM3 (ref 0.1–0.9)
MONOCYTES NFR BLD AUTO: 6.8 % (ref 5–12)
NEUTROPHILS NFR BLD AUTO: 14.43 10*3/MM3 (ref 1.7–7)
NEUTROPHILS NFR BLD AUTO: 78 % (ref 42.7–76)
NRBC BLD AUTO-RTO: 0 /100 WBC (ref 0–0.2)
PLATELET # BLD AUTO: 625 10*3/MM3 (ref 140–450)
PMV BLD AUTO: 9.9 FL (ref 6–12)
POTASSIUM SERPL-SCNC: 4.6 MMOL/L (ref 3.5–5.2)
PROT SERPL-MCNC: 8 G/DL (ref 6–8.5)
RBC # BLD AUTO: 5.45 10*6/MM3 (ref 4.14–5.8)
SODIUM SERPL-SCNC: 134 MMOL/L (ref 136–145)
WBC NRBC COR # BLD AUTO: 18.48 10*3/MM3 (ref 3.4–10.8)
WHOLE BLOOD HOLD COAG: NORMAL
WHOLE BLOOD HOLD SPECIMEN: NORMAL

## 2024-02-01 PROCEDURE — 36415 COLL VENOUS BLD VENIPUNCTURE: CPT

## 2024-02-01 PROCEDURE — 99285 EMERGENCY DEPT VISIT HI MDM: CPT

## 2024-02-01 PROCEDURE — 74177 CT ABD & PELVIS W/CONTRAST: CPT

## 2024-02-01 PROCEDURE — 80053 COMPREHEN METABOLIC PANEL: CPT

## 2024-02-01 PROCEDURE — 25010000002 ONDANSETRON PER 1 MG

## 2024-02-01 PROCEDURE — 25810000003 SODIUM CHLORIDE 0.9 % SOLUTION

## 2024-02-01 PROCEDURE — 85025 COMPLETE CBC W/AUTO DIFF WBC: CPT

## 2024-02-01 PROCEDURE — 96374 THER/PROPH/DIAG INJ IV PUSH: CPT

## 2024-02-01 PROCEDURE — 83690 ASSAY OF LIPASE: CPT

## 2024-02-01 PROCEDURE — 25510000001 IOPAMIDOL PER 1 ML: Performed by: EMERGENCY MEDICINE

## 2024-02-01 RX ORDER — ONDANSETRON 2 MG/ML
4 INJECTION INTRAMUSCULAR; INTRAVENOUS ONCE
Status: COMPLETED | OUTPATIENT
Start: 2024-02-01 | End: 2024-02-01

## 2024-02-01 RX ORDER — SODIUM CHLORIDE 0.9 % (FLUSH) 0.9 %
10 SYRINGE (ML) INJECTION AS NEEDED
Status: DISCONTINUED | OUTPATIENT
Start: 2024-02-01 | End: 2024-02-01 | Stop reason: HOSPADM

## 2024-02-01 RX ADMIN — IOPAMIDOL 100 ML: 755 INJECTION, SOLUTION INTRAVENOUS at 13:27

## 2024-02-01 RX ADMIN — ONDANSETRON HYDROCHLORIDE 4 MG: 2 SOLUTION INTRAMUSCULAR; INTRAVENOUS at 13:34

## 2024-02-01 RX ADMIN — SODIUM CHLORIDE 1000 ML: 9 INJECTION, SOLUTION INTRAVENOUS at 13:33

## 2024-02-01 NOTE — ED PROVIDER NOTES
Time: 1:07 PM EST  Date of encounter:  2/1/2024  Independent Historian/Clinical History and Information was obtained by:   Patient    History is limited by: N/A    Chief Complaint   Patient presents with    Abdominal Pain         History of Present Illness:  Patient is a 28 y.o. year old male who presents to the emergency department for evaluation of abdominal pain for 3 days.  Patient also reports nausea and vomiting, denies diarrhea or constipation, denies fevers, denies sick contacts.  Patient has abdominal tenderness in the left lower quadrant and right lower quadrant. (DARINEL Elias, provider in triage)     Patient Care Team  Primary Care Provider: Basilia Church APRN    Past Medical History:     No Known Allergies  Past Medical History:   Diagnosis Date    Cystic fibrosis     Head injury      Past Surgical History:   Procedure Laterality Date    BRONCHOSCOPY N/A 11/10/2022    Procedure: BRONCHOSCOPY WITH BRONCHOALVEOLAR LAVAGE, BRONCHIAL WASHINGS, AIRWAY INSPECTION;  Surgeon: Bright Worley MD;  Location: [facility] ENDOSCOPY;  Service: Pulmonary;  Laterality: N/A;  MUCUS PLUGGING     Family History   Problem Relation Age of Onset    Asthma Sister        Home Medications:  Prior to Admission medications    Medication Sig Start Date End Date Taking? Authorizing Provider   albuterol (PROVENTIL) (2.5 MG/3ML) 0.083% nebulizer solution Take 2.5 mg by nebulization Every 4 (Four) Hours As Needed for Wheezing. 8/21/23   Bernie Jackson APRN   budesonide (PULMICORT) 0.5 MG/2ML nebulizer solution Take 2 mL by nebulization 2 (Two) Times a Day. 8/21/23   Bernie Jackson APRN   cetirizine (zyrTEC) 10 MG tablet Take 1 tablet by mouth Daily.  Patient not taking: Reported on 9/20/2023 8/23/23   Basilia Church APRN   cholecalciferol (VITAMIN D3) 25 MCG (1000 UT) tablet Take 1 tablet by mouth Daily.  Patient not taking: Reported on 9/20/2023 8/23/23   Basilia Church APRN   Continuous Blood Gluc Sensor (Dexcom G7  "Sensor) misc Use 1 each Every 10 (Ten) Days.  Patient not taking: Reported on 11/21/2023 10/25/23   Faviola Grewal APRN   formoterol (Perforomist) 20 MCG/2ML nebulizer solution Take 2 mL by nebulization 2 (Two) Times a Day. 8/21/23   Bernie Jackson APRN   pancrelipase, Lip-Prot-Amyl, (Creon) 6000-57357 units capsule delayed-release particles capsule Take 1 capsule by mouth 3 (Three) Times a Day With Meals. And two capsules with each snack 8/23/23   Basilia Church APRN   revefenacin (YUPELRI) 175 MCG/3ML nebulizer solution Take 3 mL by nebulization Daily. 8/21/23   Bernie Jackson APRN   vitamin E 400 Units capsule Take 1 capsule by mouth Daily. 8/23/23   Basilia Church APRN        Social History:   Social History     Tobacco Use    Smoking status: Never    Smokeless tobacco: Current     Types: Chew   Vaping Use    Vaping Use: Never used   Substance Use Topics    Alcohol use: Not Currently     Comment: About a year    Drug use: Yes     Types: Marijuana         Review of Systems:  Review of Systems   Constitutional:  Negative for chills and fever.   HENT:  Negative for congestion, rhinorrhea and sore throat.    Eyes:  Negative for pain and visual disturbance.   Respiratory:  Negative for apnea, cough, chest tightness and shortness of breath.    Cardiovascular:  Negative for chest pain and palpitations.   Gastrointestinal:  Positive for abdominal pain, nausea and vomiting. Negative for constipation and diarrhea.   Genitourinary:  Negative for difficulty urinating and dysuria.   Musculoskeletal:  Negative for joint swelling and myalgias.   Skin:  Negative for color change.   Neurological:  Negative for seizures and headaches.   Psychiatric/Behavioral: Negative.     All other systems reviewed and are negative.       Physical Exam:  /98 (BP Location: Left arm, Patient Position: Sitting)   Pulse 90   Temp 97.9 °F (36.6 °C) (Oral)   Resp 15   Ht 154.9 cm (61\")   Wt 46.2 kg (101 lb 13.6 oz)   SpO2 " 97%   BMI 19.24 kg/m²         Physical Exam  Vitals and nursing note reviewed.   Constitutional:       General: He is not in acute distress.     Appearance: Normal appearance. He is not toxic-appearing.   HENT:      Head: Normocephalic and atraumatic.      Jaw: There is normal jaw occlusion.   Eyes:      General: Lids are normal.      Extraocular Movements: Extraocular movements intact.      Conjunctiva/sclera: Conjunctivae normal.      Pupils: Pupils are equal, round, and reactive to light.   Cardiovascular:      Rate and Rhythm: Normal rate and regular rhythm.      Pulses: Normal pulses.      Heart sounds: Normal heart sounds.   Pulmonary:      Effort: Pulmonary effort is normal. No respiratory distress.      Breath sounds: Normal breath sounds. No wheezing or rhonchi.   Abdominal:      General: Abdomen is flat. There is no distension.      Palpations: Abdomen is soft.      Tenderness: There is no abdominal tenderness. There is no guarding or rebound.   Musculoskeletal:         General: Normal range of motion.      Cervical back: Normal range of motion and neck supple.      Right lower leg: No edema.      Left lower leg: No edema.   Skin:     General: Skin is warm and dry.      Coloration: Skin is not cyanotic.   Neurological:      Mental Status: He is alert and oriented to person, place, and time. Mental status is at baseline.   Psychiatric:         Attention and Perception: Attention and perception normal.         Mood and Affect: Mood normal.                      Procedures:  Procedures      Medical Decision Making:      Comorbidities that affect care:    Cystic fibrosis    External Notes reviewed:    Previous Clinic Note: Diabetes office visit for CF and diabetes management      The following orders were placed and all results were independently analyzed by me:  Orders Placed This Encounter   Procedures    CT Abdomen Pelvis With Contrast    Allen Draw    Comprehensive Metabolic Panel    Lipase    Urinalysis  With Microscopic If Indicated (No Culture) - Urine, Clean Catch    CBC Auto Differential    NPO Diet NPO Type: Strict NPO    Undress & Gown    Insert Peripheral IV    CBC & Differential    Green Top (Gel)    Lavender Top    Gold Top - SST    Light Blue Top       Medications Given in the Emergency Department:  Medications   sodium chloride 0.9 % flush 10 mL (has no administration in time range)   ondansetron (ZOFRAN) injection 4 mg (4 mg Intravenous Given 2/1/24 1334)   sodium chloride 0.9 % bolus 1,000 mL (1,000 mL Intravenous New Bag 2/1/24 1333)   iopamidol (ISOVUE-370) 76 % injection 100 mL (100 mL Intravenous Given 2/1/24 1327)        ED Course:    The patient was initially evaluated in the triage area where orders were placed. The patient was later dispositioned by eKnyon Velazquez MD.      The patient was advised to stay for completion of workup which includes but is not limited to communication of labs and radiological results, reassessment and plan. The patient was advised that leaving prior to disposition by a provider could result in critical findings that are not communicated to the patient.          Labs:    Lab Results (last 24 hours)       Procedure Component Value Units Date/Time    CBC & Differential [[id number]]  (Abnormal) Collected: 02/01/24 0922    Specimen: Blood Updated: 02/01/24 0932    Narrative:      The following orders were created for panel order CBC & Differential.  Procedure                               Abnormality         Status                     ---------                               -----------         ------                     CBC Auto Differential[[id number]]        Abnormal            Final result                 Please view results for these tests on the individual orders.    Comprehensive Metabolic Panel [[id number]]  (Abnormal) Collected: 02/01/24 0922    Specimen: Blood Updated: 02/01/24 0959     Glucose 147 mg/dL      BUN 20 mg/dL      Creatinine 1.01 mg/dL      Sodium 134  mmol/L      Potassium 4.6 mmol/L      Comment: Slight hemolysis detected by analyzer. Result may be falsely elevated.        Chloride 94 mmol/L      CO2 24.0 mmol/L      Calcium 9.3 mg/dL      Total Protein 8.0 g/dL      Albumin 4.0 g/dL      ALT (SGPT) 29 U/L      AST (SGOT) 19 U/L      Alkaline Phosphatase 228 U/L      Total Bilirubin 0.4 mg/dL      Globulin 4.0 gm/dL      A/G Ratio 1.0 g/dL      BUN/Creatinine Ratio 19.8     Anion Gap 16.0 mmol/L      eGFR 104.5 mL/min/1.73     Narrative:      GFR Normal >60  Chronic Kidney Disease <60  Kidney Failure <15      Lipase [[id number]]  (Abnormal) Collected: 02/01/24 0922    Specimen: Blood Updated: 02/01/24 0959     Lipase 6 U/L     CBC Auto Differential [[id number]]  (Abnormal) Collected: 02/01/24 0922    Specimen: Blood Updated: 02/01/24 0932     WBC 18.48 10*3/mm3      RBC 5.45 10*6/mm3      Hemoglobin 16.1 g/dL      Hematocrit 48.0 %      MCV 88.1 fL      MCH 29.5 pg      MCHC 33.5 g/dL      RDW 13.9 %      RDW-SD 44.5 fl      MPV 9.9 fL      Platelets 625 10*3/mm3      Neutrophil % 78.0 %      Lymphocyte % 13.4 %      Monocyte % 6.8 %      Eosinophil % 0.9 %      Basophil % 0.5 %      Immature Grans % 0.4 %      Neutrophils, Absolute 14.43 10*3/mm3      Lymphocytes, Absolute 2.47 10*3/mm3      Monocytes, Absolute 1.25 10*3/mm3      Eosinophils, Absolute 0.16 10*3/mm3      Basophils, Absolute 0.10 10*3/mm3      Immature Grans, Absolute 0.07 10*3/mm3      nRBC 0.0 /100 WBC              Imaging:    CT Abdomen Pelvis With Contrast    Result Date: 2/1/2024  PROCEDURE: CT ABDOMEN PELVIS W CONTRAST  COMPARISON: [facility], CT, ABDOMEN/PELVIS WITH CONTRAST, 1/22/2020, 2:29.  [facility], CT, CT ABDOMEN PELVIS W CONTRAST, 3/16/2022, 10:06.  INDICATIONS: abdominal pain  PROTOCOL:   Standard imaging protocol performed    RADIATION:   DLP: 239.9mGy*cm   Automated exposure control was utilized to minimize radiation dose. CONTRAST: 100cc Isovue 370  I.V.  TECHNIQUE: Axial images of the abdomen and pelvis with intravenous contrast.  ABDOMEN:  Stable mild bronchiectasis in the left lower lobe.  Stable partially calcified pleural plaques in the left lower lobe.  There is fatty infiltration of the liver.  There is diffuse fatty replacement of the pancreas consistent with the patient's history of cystic fibrosis.  The spleen, left kidney and adrenal glands are normal.  There are no inflammatory changes around the gallbladder.  There are a few punctate nonobstructing right renal calcifications.  PELVIS:  Persistent large bowel dilatation with a large amount of stool throughout the colon and rectum with areas of wall thickening similar to the prior examinations.  No evidence of small-bowel obstruction.  Stable chronic dilatation of the appendix.  No CT evidence of acute appendicitis.  The abdominal aorta has a normal caliber.  No evidence of bowel perforation or abscess.  No osseous abnormalities are identified..  IMPRESSION:  Stable exam.  Persistent large bowel dilatation with a large amount of stool throughout the colon and rectum with areas of wall thickening similar to the prior examinations.  This is consistent with the patient's history of cystic fibrosis.  YANN GELLER MD       Electronically Signed and Approved By: YANN GELLER MD on 2/01/2024 at 13:55                Differential Diagnosis and Discussion:      Abdominal Pain: Based on the patient's signs and symptoms, I considered abdominal aortic aneurysm, small bowel obstruction, pancreatitis, acute cholecystitis, acute appendecitis, peptic ulcer disease, gastritis, colitis, endocrine disorders, irritable bowel syndrome and other differential diagnosis an etiology of the patient's abdominal pain.    All labs were reviewed and interpreted by me.  CT scan radiology impression was interpreted by me.    MDM  Number of Diagnoses or Management Options  Constipation, unspecified constipation  type  Generalized abdominal pain  Diagnosis management comments: In summary this is a 28-year-old male patient with CF who presents to the emergency department complaining of diffuse abdominal pain, episodic in nature.  At the time of my examination he has no abdominal pain and is nontender with a benign abdominal examination.  CBC independently reviewed by me and shows no critical abnormalities except leukocytosis which is near baseline for patient.  CMP independently reviewed by me and shows no critical abnormalities.  The scan of the abdomen pelvis was performed and is unremarkable for acute pathology, stable.  Does again show significant constipation for which patient was given prescription for magnesium citrate.  Very strict return to ER and follow-up instructions have been provided to the patient.                   Patient Care Considerations:    ANTIBIOTICS: I considered prescribing antibiotics as an outpatient however no bacterial focus of infection was found.      Consultants/Shared Management Plan:    None    Social Determinants of Health:    Patient is independent, reliable, and has access to care.       Disposition and Care Coordination:    Discharged: I considered escalation of care by admitting this patient to the hospital, however the patient has improved and is suitable and  stable for discharge.    I have explained the patient´s condition, diagnoses and treatment plan based on the information available to me at this time. I have answered questions and addressed any concerns. The patient has a good  understanding of the patient´s diagnosis, condition, and treatment plan as can be expected at this point. The vital signs have been stable. The patient´s condition is stable and appropriate for discharge from the emergency department.      The patient will pursue further outpatient evaluation with the primary care physician or other designated or consulting physician as outlined in the discharge  instructions. They are agreeable to this plan of care and follow-up instructions have been explained in detail. The patient has received these instructions in written format and have expressed an understanding of the discharge instructions. The patient is aware that any significant change in condition or worsening of symptoms should prompt an immediate return to this or the closest emergency department or call to 911.  I have explained discharge medications and the need for follow up with the patient/caretakers. This was also printed in the discharge instructions. Patient was discharged with the following medications and follow up:      Medication List        New Prescriptions      magnesium citrate solution  Take 296 mL by mouth 1 (One) Time for 1 dose.               Where to Get Your Medications        These medications were sent to [facility] [id number] - [place], [place] - [address] [place] [address] - [phone] [facility] [phone] [address] [address]      Phone: [phone]   magnesium citrate solution      Basilia Church APRN  [address] [address]  [phone]    In 1 week         Final diagnoses:   Generalized abdominal pain   Constipation, unspecified constipation type        ED Disposition       ED Disposition   Discharge    Condition   Stable    Comment   --               This medical record created using voice recognition software.             Kenyon Velazquez MD  02/01/24 [id number]

## 2024-02-25 NOTE — PROGRESS NOTES
"Chief Complaint  Follow-up (Patient has no complaints.)  Subjective    History of Present Illness  Marcus Jensen is a 28 y.o. male  presents to [facility] for follow-up type 2 diabetes, cystic fibrosis and vitamin D deficiency. Positive for chronic cough. Denies any current problems.  He was seen in the ED on 2/1/24 for abdominal pain and was treated for Mag Citrate for large amount of stool in the colon as seen on CT scan. Problem has resolved.     Past Medical History:   Diagnosis Date    Cystic fibrosis     Head injury         Past Surgical History:   Procedure Laterality Date    BRONCHOSCOPY N/A 11/10/2022    Procedure: BRONCHOSCOPY WITH BRONCHOALVEOLAR LAVAGE, BRONCHIAL WASHINGS, AIRWAY INSPECTION;  Surgeon: Bright Worley MD;  Location: [facility] ENDOSCOPY;  Service: Pulmonary;  Laterality: N/A;  MUCUS PLUGGING        No Known Allergies       Current Outpatient Medications:     albuterol (PROVENTIL) (2.5 MG/3ML) 0.083% nebulizer solution, Take 2.5 mg by nebulization Every 4 (Four) Hours As Needed for Wheezing., Disp: 120 each, Rfl: 5    formoterol (Perforomist) 20 MCG/2ML nebulizer solution, Take 2 mL by nebulization 2 (Two) Times a Day., Disp: 120 mL, Rfl: 11    pancrelipase, Lip-Prot-Amyl, (Creon) 6000-13006 units capsule delayed-release particles capsule, Take 1 capsule by mouth 3 (Three) Times a Day With Meals. And two capsules with each snack, Disp: 210 capsule, Rfl: 3    revefenacin (YUPELRI) 175 MCG/3ML nebulizer solution, Take 3 mL by nebulization Daily., Disp: 90 mL, Rfl: 5    Objective   /80 (BP Location: Right arm, Patient Position: Sitting, Cuff Size: Adult)   Pulse 86   Temp 98.2 °F (36.8 °C) (Temporal)   Ht 154.9 cm (61\")   Wt 47.5 kg (104 lb 12.8 oz)   SpO2 96%   BMI 19.80 kg/m²    Estimated body mass index is 19.8 kg/m² as calculated from the following:    Height as of this encounter: 154.9 cm (61\").    Weight as of this encounter: 47.5 kg (104 lb " 12.8 oz).   Physical Exam  Vitals reviewed.   Constitutional:       General: He is not in acute distress.  HENT:      Head: Normocephalic and atraumatic.   Cardiovascular:      Rate and Rhythm: Normal rate and regular rhythm.   Pulmonary:      Effort: Pulmonary effort is normal.      Breath sounds: Normal breath sounds. No wheezing, rhonchi or rales.   Skin:     General: Skin is warm and dry.   Neurological:      General: No focal deficit present.      Mental Status: He is alert.   Psychiatric:         Thought Content: Thought content normal.        Result Review :  The following data was reviewed by: DARINEL Paul on 02/26/2024:  Common labs          10/5/2023    11:36 10/25/2023    15:15 2/1/2024    09:22   Common Labs   Glucose   147    BUN   20    Creatinine   1.01    Sodium   134    Potassium   4.6    Chloride   94    Calcium   9.3    Albumin   4.0    Total Bilirubin   0.4    Alkaline Phosphatase   228    AST (SGOT)   19    ALT (SGPT)   29    WBC   18.48    Hemoglobin   16.1    Hematocrit   48.0    Platelets   625    Hemoglobin A1C  6.2     Microalbumin, Urine <1.2      CT Abdomen Pelvis With Contrast (02/01/2024 13:27)   Data reviewed : ED Provider Notes by Kenyon Velazquez MD (02/01/2024 13:07)             Assessment and Plan   Diagnoses and all orders for this visit:    1. Type 2 diabetes mellitus with hyperglycemia, without long-term current use of insulin (Primary)  -     Ambulatory Referral to Podiatry  -     Ambulatory Referral for Diabetic Eye Exam-Ophthalmology    2. Cystic fibrosis with pulmonary manifestations    3. Vitamin D deficiency         Diabetes: Check labs and recommend foot and eye exams. Following also with Diabetes management.      Cystic fibrosis: Stable on nebs.  Following with pulmonary.      Vitamin D deficiency: Check level today.    BMI is within normal parameters. No other follow-up for BMI required.       Patient was given instructions and counseling regarding his condition  or for health maintenance advice. Please see specific information pulled into the AVS if appropriate.     Follow Up   Return in about 6 months (around 8/26/2024) for Annual physical.    Dictated Utilizing Dragon Dictation.  Please note that portions of this note were completed with a voice recognition program.  Part of this note may be an electronic transcription/translation of spoken language to printed text using the Dragon Dictation System.    DARINEL Paul

## 2024-02-26 ENCOUNTER — OFFICE VISIT (OUTPATIENT)
Dept: INTERNAL MEDICINE | Facility: CLINIC | Age: 29
End: 2024-02-26
Payer: COMMERCIAL

## 2024-02-26 VITALS
SYSTOLIC BLOOD PRESSURE: 108 MMHG | BODY MASS INDEX: 19.79 KG/M2 | HEART RATE: 86 BPM | HEIGHT: 61 IN | WEIGHT: 104.8 LBS | OXYGEN SATURATION: 96 % | TEMPERATURE: 98.2 F | DIASTOLIC BLOOD PRESSURE: 80 MMHG

## 2024-02-26 DIAGNOSIS — E11.65 TYPE 2 DIABETES MELLITUS WITH HYPERGLYCEMIA, WITHOUT LONG-TERM CURRENT USE OF INSULIN: Primary | Chronic | ICD-10-CM

## 2024-02-26 DIAGNOSIS — Z00.00 ANNUAL PHYSICAL EXAM: ICD-10-CM

## 2024-02-26 DIAGNOSIS — E11.9 NEW ONSET TYPE 2 DIABETES MELLITUS: ICD-10-CM

## 2024-02-26 DIAGNOSIS — E84.0 CYSTIC FIBROSIS WITH PULMONARY MANIFESTATIONS: Chronic | ICD-10-CM

## 2024-02-26 DIAGNOSIS — E55.9 VITAMIN D DEFICIENCY: Chronic | ICD-10-CM

## 2024-02-26 LAB — 25(OH)D3 SERPL-MCNC: 6.3 NG/ML (ref 30–100)

## 2024-02-26 PROCEDURE — 82306 VITAMIN D 25 HYDROXY: CPT | Performed by: NURSE PRACTITIONER

## 2024-02-26 PROCEDURE — 1160F RVW MEDS BY RX/DR IN RCRD: CPT | Performed by: NURSE PRACTITIONER

## 2024-02-26 PROCEDURE — 1159F MED LIST DOCD IN RCRD: CPT | Performed by: NURSE PRACTITIONER

## 2024-02-28 RX ORDER — ERGOCALCIFEROL 1.25 MG/1
CAPSULE ORAL
Qty: 13 CAPSULE | Refills: 3 | Status: SHIPPED | OUTPATIENT
Start: 2024-02-28

## 2024-03-17 DIAGNOSIS — E84.0 CYSTIC FIBROSIS WITH PULMONARY MANIFESTATIONS: ICD-10-CM

## 2024-03-18 RX ORDER — PANCRELIPASE 30000; 6000; 19000 [USP'U]/1; [USP'U]/1; [USP'U]/1
CAPSULE, DELAYED RELEASE PELLETS ORAL
Qty: 480 CAPSULE | OUTPATIENT
Start: 2024-03-18

## 2024-09-16 LAB
ALBUMIN SERPL-MCNC: 3.2 G/DL (ref 3.5–5.2)
ALBUMIN/GLOB SERPL: 0.7 G/DL
ALP SERPL-CCNC: 254 U/L (ref 39–117)
ALT SERPL W P-5'-P-CCNC: 43 U/L (ref 1–41)
ANION GAP SERPL CALCULATED.3IONS-SCNC: 10.5 MMOL/L (ref 5–15)
AST SERPL-CCNC: 20 U/L (ref 1–40)
BASOPHILS # BLD AUTO: 0.09 10*3/MM3 (ref 0–0.2)
BASOPHILS NFR BLD AUTO: 0.4 % (ref 0–1.5)
BILIRUB SERPL-MCNC: 0.2 MG/DL (ref 0–1.2)
BILIRUB UR QL STRIP: NEGATIVE
BUN SERPL-MCNC: 10 MG/DL (ref 6–20)
BUN/CREAT SERPL: 11.9 (ref 7–25)
CALCIUM SPEC-SCNC: 9.4 MG/DL (ref 8.6–10.5)
CHLORIDE SERPL-SCNC: 98 MMOL/L (ref 98–107)
CLARITY UR: CLEAR
CO2 SERPL-SCNC: 27.5 MMOL/L (ref 22–29)
COLOR UR: YELLOW
CREAT SERPL-MCNC: 0.84 MG/DL (ref 0.76–1.27)
DEPRECATED RDW RBC AUTO: 41.1 FL (ref 37–54)
EGFRCR SERPLBLD CKD-EPI 2021: 121.8 ML/MIN/1.73
EOSINOPHIL # BLD AUTO: 0.43 10*3/MM3 (ref 0–0.4)
EOSINOPHIL NFR BLD AUTO: 1.9 % (ref 0.3–6.2)
ERYTHROCYTE [DISTWIDTH] IN BLOOD BY AUTOMATED COUNT: 12.7 % (ref 12.3–15.4)
FLUAV SUBTYP SPEC NAA+PROBE: NOT DETECTED
FLUBV RNA ISLT QL NAA+PROBE: NOT DETECTED
GLOBULIN UR ELPH-MCNC: 4.5 GM/DL
GLUCOSE SERPL-MCNC: 114 MG/DL (ref 65–99)
GLUCOSE UR STRIP-MCNC: NEGATIVE MG/DL
HCT VFR BLD AUTO: 40.7 % (ref 37.5–51)
HGB BLD-MCNC: 13.1 G/DL (ref 13–17.7)
HGB UR QL STRIP.AUTO: NEGATIVE
HOLD SPECIMEN: NORMAL
HOLD SPECIMEN: NORMAL
IMM GRANULOCYTES # BLD AUTO: 0.1 10*3/MM3 (ref 0–0.05)
IMM GRANULOCYTES NFR BLD AUTO: 0.4 % (ref 0–0.5)
KETONES UR QL STRIP: NEGATIVE
LEUKOCYTE ESTERASE UR QL STRIP.AUTO: NEGATIVE
LIPASE SERPL-CCNC: 5 U/L (ref 13–60)
LYMPHOCYTES # BLD AUTO: 2.95 10*3/MM3 (ref 0.7–3.1)
LYMPHOCYTES NFR BLD AUTO: 12.7 % (ref 19.6–45.3)
MCH RBC QN AUTO: 28.9 PG (ref 26.6–33)
MCHC RBC AUTO-ENTMCNC: 32.2 G/DL (ref 31.5–35.7)
MCV RBC AUTO: 89.6 FL (ref 79–97)
MONOCYTES # BLD AUTO: 1.61 10*3/MM3 (ref 0.1–0.9)
MONOCYTES NFR BLD AUTO: 6.9 % (ref 5–12)
NEUTROPHILS NFR BLD AUTO: 18.04 10*3/MM3 (ref 1.7–7)
NEUTROPHILS NFR BLD AUTO: 77.7 % (ref 42.7–76)
NITRITE UR QL STRIP: NEGATIVE
NRBC BLD AUTO-RTO: 0 /100 WBC (ref 0–0.2)
PH UR STRIP.AUTO: 5.5 [PH] (ref 5–8)
PLATELET # BLD AUTO: 734 10*3/MM3 (ref 140–450)
PMV BLD AUTO: 9.1 FL (ref 6–12)
POTASSIUM SERPL-SCNC: 4.8 MMOL/L (ref 3.5–5.2)
PROT SERPL-MCNC: 7.7 G/DL (ref 6–8.5)
PROT UR QL STRIP: NEGATIVE
RBC # BLD AUTO: 4.54 10*6/MM3 (ref 4.14–5.8)
RSV RNA NPH QL NAA+NON-PROBE: NOT DETECTED
SARS-COV-2 RNA RESP QL NAA+PROBE: NOT DETECTED
SODIUM SERPL-SCNC: 136 MMOL/L (ref 136–145)
SP GR UR STRIP: 1.01 (ref 1–1.03)
UROBILINOGEN UR QL STRIP: NORMAL
WBC NRBC COR # BLD AUTO: 23.22 10*3/MM3 (ref 3.4–10.8)
WHOLE BLOOD HOLD COAG: NORMAL
WHOLE BLOOD HOLD SPECIMEN: NORMAL

## 2024-09-16 PROCEDURE — 81003 URINALYSIS AUTO W/O SCOPE: CPT

## 2024-09-16 PROCEDURE — 86738 MYCOPLASMA ANTIBODY: CPT | Performed by: FAMILY MEDICINE

## 2024-09-16 PROCEDURE — 99285 EMERGENCY DEPT VISIT HI MDM: CPT

## 2024-09-16 PROCEDURE — 36415 COLL VENOUS BLD VENIPUNCTURE: CPT

## 2024-09-16 PROCEDURE — 85025 COMPLETE CBC W/AUTO DIFF WBC: CPT

## 2024-09-16 PROCEDURE — 80053 COMPREHEN METABOLIC PANEL: CPT

## 2024-09-16 PROCEDURE — 87637 SARSCOV2&INF A&B&RSV AMP PRB: CPT

## 2024-09-16 PROCEDURE — G0378 HOSPITAL OBSERVATION PER HR: HCPCS

## 2024-09-16 PROCEDURE — 83690 ASSAY OF LIPASE: CPT

## 2024-09-16 RX ORDER — SODIUM CHLORIDE 0.9 % (FLUSH) 0.9 %
10 SYRINGE (ML) INJECTION AS NEEDED
Status: DISCONTINUED | OUTPATIENT
Start: 2024-09-16 | End: 2024-09-19 | Stop reason: HOSPADM

## 2024-09-17 ENCOUNTER — APPOINTMENT (OUTPATIENT)
Dept: GENERAL RADIOLOGY | Facility: HOSPITAL | Age: 29
End: 2024-09-17
Payer: COMMERCIAL

## 2024-09-17 ENCOUNTER — APPOINTMENT (OUTPATIENT)
Dept: CT IMAGING | Facility: HOSPITAL | Age: 29
End: 2024-09-17
Payer: COMMERCIAL

## 2024-09-17 ENCOUNTER — HOSPITAL ENCOUNTER (OUTPATIENT)
Facility: HOSPITAL | Age: 29
Setting detail: OBSERVATION
Discharge: HOME | End: 2024-09-19
Attending: EMERGENCY MEDICINE | Admitting: FAMILY MEDICINE
Payer: COMMERCIAL

## 2024-09-17 DIAGNOSIS — K56.609: Primary | ICD-10-CM

## 2024-09-17 DIAGNOSIS — J47.9 BRONCHIECTASIS WITHOUT ACUTE EXACERBATION: ICD-10-CM

## 2024-09-17 DIAGNOSIS — E84.0 CYSTIC FIBROSIS WITH PULMONARY MANIFESTATIONS: ICD-10-CM

## 2024-09-17 DIAGNOSIS — R11.2 NAUSEA AND VOMITING, UNSPECIFIED VOMITING TYPE: ICD-10-CM

## 2024-09-17 PROBLEM — R10.9 ABDOMINAL PAIN: Status: ACTIVE | Noted: 2024-09-17

## 2024-09-17 LAB
ANION GAP SERPL CALCULATED.3IONS-SCNC: 8.9 MMOL/L (ref 5–15)
BASOPHILS # BLD AUTO: 0.06 10*3/MM3 (ref 0–0.2)
BASOPHILS NFR BLD AUTO: 0.4 % (ref 0–1.5)
BUN SERPL-MCNC: 12 MG/DL (ref 6–20)
BUN/CREAT SERPL: 16.7 (ref 7–25)
CALCIUM SPEC-SCNC: 7.7 MG/DL (ref 8.6–10.5)
CHLORIDE SERPL-SCNC: 105 MMOL/L (ref 98–107)
CO2 SERPL-SCNC: 24.1 MMOL/L (ref 22–29)
CREAT SERPL-MCNC: 0.72 MG/DL (ref 0.76–1.27)
DEPRECATED RDW RBC AUTO: 42.2 FL (ref 37–54)
EGFRCR SERPLBLD CKD-EPI 2021: 127.6 ML/MIN/1.73
EOSINOPHIL # BLD AUTO: 0.54 10*3/MM3 (ref 0–0.4)
EOSINOPHIL NFR BLD AUTO: 3.3 % (ref 0.3–6.2)
ERYTHROCYTE [DISTWIDTH] IN BLOOD BY AUTOMATED COUNT: 12.9 % (ref 12.3–15.4)
GLUCOSE SERPL-MCNC: 83 MG/DL (ref 65–99)
HCT VFR BLD AUTO: 34.8 % (ref 37.5–51)
HGB BLD-MCNC: 11.2 G/DL (ref 13–17.7)
IMM GRANULOCYTES # BLD AUTO: 0.08 10*3/MM3 (ref 0–0.05)
IMM GRANULOCYTES NFR BLD AUTO: 0.5 % (ref 0–0.5)
LYMPHOCYTES # BLD AUTO: 2.74 10*3/MM3 (ref 0.7–3.1)
LYMPHOCYTES NFR BLD AUTO: 16.7 % (ref 19.6–45.3)
MAGNESIUM SERPL-MCNC: 1.9 MG/DL (ref 1.6–2.6)
MCH RBC QN AUTO: 29.2 PG (ref 26.6–33)
MCHC RBC AUTO-ENTMCNC: 32.2 G/DL (ref 31.5–35.7)
MCV RBC AUTO: 90.9 FL (ref 79–97)
MONOCYTES # BLD AUTO: 1.24 10*3/MM3 (ref 0.1–0.9)
MONOCYTES NFR BLD AUTO: 7.6 % (ref 5–12)
NEUTROPHILS NFR BLD AUTO: 11.74 10*3/MM3 (ref 1.7–7)
NEUTROPHILS NFR BLD AUTO: 71.5 % (ref 42.7–76)
NRBC BLD AUTO-RTO: 0 /100 WBC (ref 0–0.2)
PLATELET # BLD AUTO: 585 10*3/MM3 (ref 140–450)
PMV BLD AUTO: 9.2 FL (ref 6–12)
POTASSIUM SERPL-SCNC: 4.5 MMOL/L (ref 3.5–5.2)
PROCALCITONIN SERPL-MCNC: 0.19 NG/ML (ref 0–0.25)
RBC # BLD AUTO: 3.83 10*6/MM3 (ref 4.14–5.8)
S PNEUM AG SPEC QL LA: POSITIVE
SODIUM SERPL-SCNC: 138 MMOL/L (ref 136–145)
WBC NRBC COR # BLD AUTO: 16.4 10*3/MM3 (ref 3.4–10.8)

## 2024-09-17 PROCEDURE — 96361 HYDRATE IV INFUSION ADD-ON: CPT

## 2024-09-17 PROCEDURE — 80048 BASIC METABOLIC PNL TOTAL CA: CPT | Performed by: STUDENT IN AN ORGANIZED HEALTH CARE EDUCATION/TRAINING PROGRAM

## 2024-09-17 PROCEDURE — 94640 AIRWAY INHALATION TREATMENT: CPT

## 2024-09-17 PROCEDURE — 87205 SMEAR GRAM STAIN: CPT | Performed by: PHYSICIAN ASSISTANT

## 2024-09-17 PROCEDURE — 25010000002 KETOROLAC TROMETHAMINE PER 15 MG: Performed by: NURSE PRACTITIONER

## 2024-09-17 PROCEDURE — 25510000001 IOPAMIDOL PER 1 ML: Performed by: EMERGENCY MEDICINE

## 2024-09-17 PROCEDURE — 25010000002 DROPERIDOL PER 5 MG: Performed by: NURSE PRACTITIONER

## 2024-09-17 PROCEDURE — 94799 UNLISTED PULMONARY SVC/PX: CPT

## 2024-09-17 PROCEDURE — 94667 MNPJ CHEST WALL 1ST: CPT

## 2024-09-17 PROCEDURE — 99223 1ST HOSP IP/OBS HIGH 75: CPT | Performed by: STUDENT IN AN ORGANIZED HEALTH CARE EDUCATION/TRAINING PROGRAM

## 2024-09-17 PROCEDURE — 84145 PROCALCITONIN (PCT): CPT | Performed by: PHYSICIAN ASSISTANT

## 2024-09-17 PROCEDURE — 71045 X-RAY EXAM CHEST 1 VIEW: CPT

## 2024-09-17 PROCEDURE — 25010000002 ONDANSETRON PER 1 MG: Performed by: NURSE PRACTITIONER

## 2024-09-17 PROCEDURE — G0378 HOSPITAL OBSERVATION PER HR: HCPCS

## 2024-09-17 PROCEDURE — 94664 DEMO&/EVAL PT USE INHALER: CPT

## 2024-09-17 PROCEDURE — 87449 NOS EACH ORGANISM AG IA: CPT | Performed by: FAMILY MEDICINE

## 2024-09-17 PROCEDURE — 74177 CT ABD & PELVIS W/CONTRAST: CPT

## 2024-09-17 PROCEDURE — 96375 TX/PRO/DX INJ NEW DRUG ADDON: CPT

## 2024-09-17 PROCEDURE — 83735 ASSAY OF MAGNESIUM: CPT | Performed by: STUDENT IN AN ORGANIZED HEALTH CARE EDUCATION/TRAINING PROGRAM

## 2024-09-17 PROCEDURE — 87899 AGENT NOS ASSAY W/OPTIC: CPT | Performed by: PHYSICIAN ASSISTANT

## 2024-09-17 PROCEDURE — 25810000003 LACTATED RINGERS PER 1000 ML

## 2024-09-17 PROCEDURE — 99204 OFFICE O/P NEW MOD 45 MIN: CPT | Performed by: INTERNAL MEDICINE

## 2024-09-17 PROCEDURE — 85025 COMPLETE CBC W/AUTO DIFF WBC: CPT | Performed by: STUDENT IN AN ORGANIZED HEALTH CARE EDUCATION/TRAINING PROGRAM

## 2024-09-17 PROCEDURE — 63710000001 REVEFENACIN 175 MCG/3ML SOLUTION: Performed by: STUDENT IN AN ORGANIZED HEALTH CARE EDUCATION/TRAINING PROGRAM

## 2024-09-17 PROCEDURE — 71250 CT THORAX DX C-: CPT

## 2024-09-17 PROCEDURE — 25810000003 SODIUM CHLORIDE 0.9 % SOLUTION: Performed by: NURSE PRACTITIONER

## 2024-09-17 PROCEDURE — 87070 CULTURE OTHR SPECIMN AEROBIC: CPT | Performed by: PHYSICIAN ASSISTANT

## 2024-09-17 PROCEDURE — 25810000003 LACTATED RINGERS PER 1000 ML: Performed by: STUDENT IN AN ORGANIZED HEALTH CARE EDUCATION/TRAINING PROGRAM

## 2024-09-17 RX ORDER — BISACODYL 5 MG/1
5 TABLET, DELAYED RELEASE ORAL DAILY PRN
Status: DISCONTINUED | OUTPATIENT
Start: 2024-09-17 | End: 2024-09-19 | Stop reason: HOSPADM

## 2024-09-17 RX ORDER — AMOXICILLIN 250 MG
2 CAPSULE ORAL 2 TIMES DAILY PRN
Status: DISCONTINUED | OUTPATIENT
Start: 2024-09-17 | End: 2024-09-17

## 2024-09-17 RX ORDER — ARFORMOTEROL TARTRATE 15 UG/2ML
15 SOLUTION RESPIRATORY (INHALATION)
Status: DISCONTINUED | OUTPATIENT
Start: 2024-09-17 | End: 2024-09-19 | Stop reason: HOSPADM

## 2024-09-17 RX ORDER — POLYETHYLENE GLYCOL 3350 17 G/17G
17 POWDER, FOR SOLUTION ORAL 3 TIMES DAILY
Status: DISCONTINUED | OUTPATIENT
Start: 2024-09-17 | End: 2024-09-19 | Stop reason: HOSPADM

## 2024-09-17 RX ORDER — DROPERIDOL 2.5 MG/ML
2.5 INJECTION, SOLUTION INTRAMUSCULAR; INTRAVENOUS ONCE
Status: COMPLETED | OUTPATIENT
Start: 2024-09-17 | End: 2024-09-17

## 2024-09-17 RX ORDER — AMOXICILLIN 250 MG
2 CAPSULE ORAL 2 TIMES DAILY PRN
Status: DISCONTINUED | OUTPATIENT
Start: 2024-09-17 | End: 2024-09-19 | Stop reason: HOSPADM

## 2024-09-17 RX ORDER — ACETAMINOPHEN 325 MG/1
650 TABLET ORAL EVERY 6 HOURS PRN
Status: DISCONTINUED | OUTPATIENT
Start: 2024-09-17 | End: 2024-09-19 | Stop reason: HOSPADM

## 2024-09-17 RX ORDER — SODIUM CHLORIDE 0.9 % (FLUSH) 0.9 %
10 SYRINGE (ML) INJECTION EVERY 12 HOURS SCHEDULED
Status: DISCONTINUED | OUTPATIENT
Start: 2024-09-17 | End: 2024-09-19 | Stop reason: HOSPADM

## 2024-09-17 RX ORDER — HYDRALAZINE HYDROCHLORIDE 20 MG/ML
10 INJECTION INTRAMUSCULAR; INTRAVENOUS EVERY 6 HOURS PRN
Status: DISCONTINUED | OUTPATIENT
Start: 2024-09-17 | End: 2024-09-19 | Stop reason: HOSPADM

## 2024-09-17 RX ORDER — SODIUM CHLORIDE 9 MG/ML
40 INJECTION, SOLUTION INTRAVENOUS AS NEEDED
Status: DISCONTINUED | OUTPATIENT
Start: 2024-09-17 | End: 2024-09-19 | Stop reason: HOSPADM

## 2024-09-17 RX ORDER — SODIUM CHLORIDE, SODIUM LACTATE, POTASSIUM CHLORIDE, CALCIUM CHLORIDE 600; 310; 30; 20 MG/100ML; MG/100ML; MG/100ML; MG/100ML
75 INJECTION, SOLUTION INTRAVENOUS CONTINUOUS
Status: DISCONTINUED | OUTPATIENT
Start: 2024-09-17 | End: 2024-09-18

## 2024-09-17 RX ORDER — IOPAMIDOL 755 MG/ML
100 INJECTION, SOLUTION INTRAVASCULAR
Status: COMPLETED | OUTPATIENT
Start: 2024-09-17 | End: 2024-09-17

## 2024-09-17 RX ORDER — BUDESONIDE 0.5 MG/2ML
0.5 INHALANT ORAL
Status: DISCONTINUED | OUTPATIENT
Start: 2024-09-17 | End: 2024-09-19 | Stop reason: HOSPADM

## 2024-09-17 RX ORDER — BISACODYL 5 MG/1
5 TABLET, DELAYED RELEASE ORAL DAILY PRN
Status: DISCONTINUED | OUTPATIENT
Start: 2024-09-17 | End: 2024-09-17

## 2024-09-17 RX ORDER — DOXYCYCLINE 100 MG/1
100 CAPSULE ORAL EVERY 12 HOURS SCHEDULED
Status: DISCONTINUED | OUTPATIENT
Start: 2024-09-17 | End: 2024-09-18

## 2024-09-17 RX ORDER — BISACODYL 10 MG
10 SUPPOSITORY, RECTAL RECTAL DAILY PRN
Status: DISCONTINUED | OUTPATIENT
Start: 2024-09-17 | End: 2024-09-17

## 2024-09-17 RX ORDER — ONDANSETRON 2 MG/ML
4 INJECTION INTRAMUSCULAR; INTRAVENOUS ONCE
Status: COMPLETED | OUTPATIENT
Start: 2024-09-17 | End: 2024-09-17

## 2024-09-17 RX ORDER — ONDANSETRON 2 MG/ML
4 INJECTION INTRAMUSCULAR; INTRAVENOUS EVERY 6 HOURS PRN
Status: DISCONTINUED | OUTPATIENT
Start: 2024-09-17 | End: 2024-09-19 | Stop reason: HOSPADM

## 2024-09-17 RX ORDER — ALBUTEROL SULFATE 0.83 MG/ML
2.5 SOLUTION RESPIRATORY (INHALATION) EVERY 4 HOURS PRN
Status: DISCONTINUED | OUTPATIENT
Start: 2024-09-17 | End: 2024-09-19 | Stop reason: HOSPADM

## 2024-09-17 RX ORDER — BISACODYL 10 MG
10 SUPPOSITORY, RECTAL RECTAL DAILY
Status: DISCONTINUED | OUTPATIENT
Start: 2024-09-17 | End: 2024-09-19 | Stop reason: HOSPADM

## 2024-09-17 RX ORDER — SODIUM CHLORIDE 0.9 % (FLUSH) 0.9 %
10 SYRINGE (ML) INJECTION AS NEEDED
Status: DISCONTINUED | OUTPATIENT
Start: 2024-09-17 | End: 2024-09-19 | Stop reason: HOSPADM

## 2024-09-17 RX ORDER — KETOROLAC TROMETHAMINE 30 MG/ML
30 INJECTION, SOLUTION INTRAMUSCULAR; INTRAVENOUS ONCE
Status: COMPLETED | OUTPATIENT
Start: 2024-09-17 | End: 2024-09-17

## 2024-09-17 RX ORDER — POLYETHYLENE GLYCOL 3350 17 G/17G
17 POWDER, FOR SOLUTION ORAL DAILY PRN
Status: DISCONTINUED | OUTPATIENT
Start: 2024-09-17 | End: 2024-09-17

## 2024-09-17 RX ADMIN — ONDANSETRON 4 MG: 2 INJECTION INTRAMUSCULAR; INTRAVENOUS at 00:55

## 2024-09-17 RX ADMIN — POLYETHYLENE GLYCOL 3350 17 G: 17 POWDER, FOR SOLUTION ORAL at 20:18

## 2024-09-17 RX ADMIN — REVEFENACIN 175 MCG: 175 SOLUTION RESPIRATORY (INHALATION) at 09:36

## 2024-09-17 RX ADMIN — PANCRELIPASE 6000 UNITS OF LIPASE: 30000; 6000; 19000 CAPSULE, DELAYED RELEASE PELLETS ORAL at 17:58

## 2024-09-17 RX ADMIN — Medication 10 ML: at 20:19

## 2024-09-17 RX ADMIN — SODIUM CHLORIDE, POTASSIUM CHLORIDE, SODIUM LACTATE AND CALCIUM CHLORIDE 75 ML/HR: 600; 310; 30; 20 INJECTION, SOLUTION INTRAVENOUS at 05:36

## 2024-09-17 RX ADMIN — DROPERIDOL 2.5 MG: 2.5 INJECTION, SOLUTION INTRAMUSCULAR; INTRAVENOUS at 02:38

## 2024-09-17 RX ADMIN — ARFORMOTEROL TARTRATE 15 MCG: 15 SOLUTION RESPIRATORY (INHALATION) at 09:36

## 2024-09-17 RX ADMIN — SODIUM CHLORIDE 1000 ML: 9 INJECTION, SOLUTION INTRAVENOUS at 03:33

## 2024-09-17 RX ADMIN — DOXYCYCLINE 100 MG: 100 CAPSULE ORAL at 12:49

## 2024-09-17 RX ADMIN — BUDESONIDE 0.5 MG: 0.5 SUSPENSION RESPIRATORY (INHALATION) at 23:02

## 2024-09-17 RX ADMIN — Medication 10 ML: at 08:54

## 2024-09-17 RX ADMIN — SODIUM CHLORIDE, POTASSIUM CHLORIDE, SODIUM LACTATE AND CALCIUM CHLORIDE 50 ML/HR: 600; 310; 30; 20 INJECTION, SOLUTION INTRAVENOUS at 23:22

## 2024-09-17 RX ADMIN — SODIUM CHLORIDE 1000 ML: 9 INJECTION, SOLUTION INTRAVENOUS at 00:55

## 2024-09-17 RX ADMIN — ARFORMOTEROL TARTRATE 15 MCG: 15 SOLUTION RESPIRATORY (INHALATION) at 23:02

## 2024-09-17 RX ADMIN — PANCRELIPASE 6000 UNITS OF LIPASE: 30000; 6000; 19000 CAPSULE, DELAYED RELEASE PELLETS ORAL at 12:49

## 2024-09-17 RX ADMIN — POLYETHYLENE GLYCOL 3350 17 G: 17 POWDER, FOR SOLUTION ORAL at 15:47

## 2024-09-17 RX ADMIN — DOXYCYCLINE 100 MG: 100 CAPSULE ORAL at 20:19

## 2024-09-17 RX ADMIN — PANCRELIPASE 6000 UNITS OF LIPASE: 30000; 6000; 19000 CAPSULE, DELAYED RELEASE PELLETS ORAL at 08:54

## 2024-09-17 RX ADMIN — KETOROLAC TROMETHAMINE 30 MG: 30 INJECTION, SOLUTION INTRAMUSCULAR; INTRAVENOUS at 00:50

## 2024-09-17 RX ADMIN — POLYETHYLENE GLYCOL 3350 17 G: 17 POWDER, FOR SOLUTION ORAL at 12:49

## 2024-09-17 RX ADMIN — IOPAMIDOL 100 ML: 755 INJECTION, SOLUTION INTRAVENOUS at 02:22

## 2024-09-18 LAB
ALBUMIN SERPL-MCNC: 2.7 G/DL (ref 3.5–5.2)
ALP SERPL-CCNC: 227 U/L (ref 39–117)
ALT SERPL W P-5'-P-CCNC: 31 U/L (ref 1–41)
ANION GAP SERPL CALCULATED.3IONS-SCNC: 6.8 MMOL/L (ref 5–15)
AST SERPL-CCNC: 21 U/L (ref 1–40)
BASOPHILS # BLD AUTO: 0.1 10*3/MM3 (ref 0–0.2)
BASOPHILS NFR BLD AUTO: 0.6 % (ref 0–1.5)
BILIRUB CONJ SERPL-MCNC: 0.1 MG/DL (ref 0–0.3)
BILIRUB INDIRECT SERPL-MCNC: 0.2 MG/DL
BILIRUB SERPL-MCNC: 0.3 MG/DL (ref 0–1.2)
BUN SERPL-MCNC: 9 MG/DL (ref 6–20)
BUN/CREAT SERPL: 11.4 (ref 7–25)
CALCIUM SPEC-SCNC: 9.1 MG/DL (ref 8.6–10.5)
CHLORIDE SERPL-SCNC: 102 MMOL/L (ref 98–107)
CO2 SERPL-SCNC: 28.2 MMOL/L (ref 22–29)
CREAT SERPL-MCNC: 0.79 MG/DL (ref 0.76–1.27)
DEPRECATED RDW RBC AUTO: 41.8 FL (ref 37–54)
EGFRCR SERPLBLD CKD-EPI 2021: 124.1 ML/MIN/1.73
EOSINOPHIL # BLD AUTO: 0.6 10*3/MM3 (ref 0–0.4)
EOSINOPHIL NFR BLD AUTO: 3.6 % (ref 0.3–6.2)
ERYTHROCYTE [DISTWIDTH] IN BLOOD BY AUTOMATED COUNT: 12.8 % (ref 12.3–15.4)
GLUCOSE SERPL-MCNC: 101 MG/DL (ref 65–99)
HCT VFR BLD AUTO: 36.8 % (ref 37.5–51)
HGB BLD-MCNC: 11.7 G/DL (ref 13–17.7)
IMM GRANULOCYTES # BLD AUTO: 0.07 10*3/MM3 (ref 0–0.05)
IMM GRANULOCYTES NFR BLD AUTO: 0.4 % (ref 0–0.5)
L PNEUMO1 AG UR QL IA: NEGATIVE
LYMPHOCYTES # BLD AUTO: 2.82 10*3/MM3 (ref 0.7–3.1)
LYMPHOCYTES NFR BLD AUTO: 16.9 % (ref 19.6–45.3)
M PNEUMO IGM SER QL: NEGATIVE
MAGNESIUM SERPL-MCNC: 1.9 MG/DL (ref 1.6–2.6)
MCH RBC QN AUTO: 28.7 PG (ref 26.6–33)
MCHC RBC AUTO-ENTMCNC: 31.8 G/DL (ref 31.5–35.7)
MCV RBC AUTO: 90.2 FL (ref 79–97)
MONOCYTES # BLD AUTO: 1.22 10*3/MM3 (ref 0.1–0.9)
MONOCYTES NFR BLD AUTO: 7.3 % (ref 5–12)
NEUTROPHILS NFR BLD AUTO: 11.89 10*3/MM3 (ref 1.7–7)
NEUTROPHILS NFR BLD AUTO: 71.2 % (ref 42.7–76)
NRBC BLD AUTO-RTO: 0 /100 WBC (ref 0–0.2)
NT-PROBNP SERPL-MCNC: 42.6 PG/ML (ref 0–450)
PHOSPHATE SERPL-MCNC: 3.2 MG/DL (ref 2.5–4.5)
PLATELET # BLD AUTO: 648 10*3/MM3 (ref 140–450)
PMV BLD AUTO: 9.3 FL (ref 6–12)
POTASSIUM SERPL-SCNC: 4.7 MMOL/L (ref 3.5–5.2)
PROT SERPL-MCNC: 6.5 G/DL (ref 6–8.5)
RBC # BLD AUTO: 4.08 10*6/MM3 (ref 4.14–5.8)
SODIUM SERPL-SCNC: 137 MMOL/L (ref 136–145)
WBC NRBC COR # BLD AUTO: 16.7 10*3/MM3 (ref 3.4–10.8)

## 2024-09-18 PROCEDURE — 87147 CULTURE TYPE IMMUNOLOGIC: CPT | Performed by: FAMILY MEDICINE

## 2024-09-18 PROCEDURE — 94799 UNLISTED PULMONARY SVC/PX: CPT

## 2024-09-18 PROCEDURE — 99232 SBSQ HOSP IP/OBS MODERATE 35: CPT | Performed by: FAMILY MEDICINE

## 2024-09-18 PROCEDURE — 83880 ASSAY OF NATRIURETIC PEPTIDE: CPT | Performed by: PHYSICIAN ASSISTANT

## 2024-09-18 PROCEDURE — 80076 HEPATIC FUNCTION PANEL: CPT | Performed by: FAMILY MEDICINE

## 2024-09-18 PROCEDURE — 96365 THER/PROPH/DIAG IV INF INIT: CPT

## 2024-09-18 PROCEDURE — 63710000001 REVEFENACIN 175 MCG/3ML SOLUTION: Performed by: STUDENT IN AN ORGANIZED HEALTH CARE EDUCATION/TRAINING PROGRAM

## 2024-09-18 PROCEDURE — 87205 SMEAR GRAM STAIN: CPT | Performed by: FAMILY MEDICINE

## 2024-09-18 PROCEDURE — 83735 ASSAY OF MAGNESIUM: CPT | Performed by: FAMILY MEDICINE

## 2024-09-18 PROCEDURE — 87070 CULTURE OTHR SPECIMN AEROBIC: CPT | Performed by: FAMILY MEDICINE

## 2024-09-18 PROCEDURE — 99244 OFF/OP CNSLTJ NEW/EST MOD 40: CPT | Performed by: INTERNAL MEDICINE

## 2024-09-18 PROCEDURE — 87186 SC STD MICRODIL/AGAR DIL: CPT | Performed by: FAMILY MEDICINE

## 2024-09-18 PROCEDURE — 84100 ASSAY OF PHOSPHORUS: CPT | Performed by: FAMILY MEDICINE

## 2024-09-18 PROCEDURE — 96361 HYDRATE IV INFUSION ADD-ON: CPT

## 2024-09-18 PROCEDURE — 85025 COMPLETE CBC W/AUTO DIFF WBC: CPT | Performed by: FAMILY MEDICINE

## 2024-09-18 PROCEDURE — G0378 HOSPITAL OBSERVATION PER HR: HCPCS

## 2024-09-18 PROCEDURE — 94664 DEMO&/EVAL PT USE INHALER: CPT

## 2024-09-18 PROCEDURE — 80048 BASIC METABOLIC PNL TOTAL CA: CPT | Performed by: FAMILY MEDICINE

## 2024-09-18 PROCEDURE — 25010000002 CEFTRIAXONE PER 250 MG: Performed by: FAMILY MEDICINE

## 2024-09-18 RX ADMIN — BUDESONIDE 0.5 MG: 0.5 SUSPENSION RESPIRATORY (INHALATION) at 21:20

## 2024-09-18 RX ADMIN — ARFORMOTEROL TARTRATE 15 MCG: 15 SOLUTION RESPIRATORY (INHALATION) at 10:24

## 2024-09-18 RX ADMIN — PANCRELIPASE 6000 UNITS OF LIPASE: 30000; 6000; 19000 CAPSULE, DELAYED RELEASE PELLETS ORAL at 17:35

## 2024-09-18 RX ADMIN — Medication 10 ML: at 08:05

## 2024-09-18 RX ADMIN — CEFTRIAXONE SODIUM 2000 MG: 2 INJECTION, POWDER, FOR SOLUTION INTRAMUSCULAR; INTRAVENOUS at 08:04

## 2024-09-18 RX ADMIN — PANCRELIPASE 6000 UNITS OF LIPASE: 30000; 6000; 19000 CAPSULE, DELAYED RELEASE PELLETS ORAL at 11:34

## 2024-09-18 RX ADMIN — ARFORMOTEROL TARTRATE 15 MCG: 15 SOLUTION RESPIRATORY (INHALATION) at 21:20

## 2024-09-18 RX ADMIN — REVEFENACIN 175 MCG: 175 SOLUTION RESPIRATORY (INHALATION) at 10:24

## 2024-09-18 RX ADMIN — PANCRELIPASE 6000 UNITS OF LIPASE: 30000; 6000; 19000 CAPSULE, DELAYED RELEASE PELLETS ORAL at 08:26

## 2024-09-18 RX ADMIN — BUDESONIDE 0.5 MG: 0.5 SUSPENSION RESPIRATORY (INHALATION) at 10:24

## 2024-09-18 RX ADMIN — POLYETHYLENE GLYCOL 3350 17 G: 17 POWDER, FOR SOLUTION ORAL at 08:28

## 2024-09-18 RX ADMIN — Medication 10 ML: at 21:15

## 2024-09-19 ENCOUNTER — READMISSION MANAGEMENT (OUTPATIENT)
Dept: CALL CENTER | Facility: HOSPITAL | Age: 29
End: 2024-09-19
Payer: COMMERCIAL

## 2024-09-19 VITALS
SYSTOLIC BLOOD PRESSURE: 112 MMHG | HEART RATE: 90 BPM | BODY MASS INDEX: 24.43 KG/M2 | DIASTOLIC BLOOD PRESSURE: 73 MMHG | HEIGHT: 61 IN | OXYGEN SATURATION: 97 % | TEMPERATURE: 98.1 F | WEIGHT: 129.41 LBS | RESPIRATION RATE: 18 BRPM

## 2024-09-19 PROBLEM — R10.9 ABDOMINAL PAIN: Status: RESOLVED | Noted: 2024-09-17 | Resolved: 2024-09-19

## 2024-09-19 LAB
ALBUMIN SERPL-MCNC: 2.9 G/DL (ref 3.5–5.2)
ALP SERPL-CCNC: 252 U/L (ref 39–117)
ALT SERPL W P-5'-P-CCNC: 42 U/L (ref 1–41)
ANION GAP SERPL CALCULATED.3IONS-SCNC: 9.9 MMOL/L (ref 5–15)
AST SERPL-CCNC: 37 U/L (ref 1–40)
BACTERIA SPEC RESP CULT: NORMAL
BASOPHILS # BLD AUTO: 0.1 10*3/MM3 (ref 0–0.2)
BASOPHILS NFR BLD AUTO: 0.6 % (ref 0–1.5)
BILIRUB CONJ SERPL-MCNC: 0.1 MG/DL (ref 0–0.3)
BILIRUB INDIRECT SERPL-MCNC: 0.1 MG/DL
BILIRUB SERPL-MCNC: 0.2 MG/DL (ref 0–1.2)
BUN SERPL-MCNC: 10 MG/DL (ref 6–20)
BUN/CREAT SERPL: 10.6 (ref 7–25)
CALCIUM SPEC-SCNC: 9.3 MG/DL (ref 8.6–10.5)
CHLORIDE SERPL-SCNC: 99 MMOL/L (ref 98–107)
CO2 SERPL-SCNC: 26.1 MMOL/L (ref 22–29)
CREAT SERPL-MCNC: 0.94 MG/DL (ref 0.76–1.27)
DEPRECATED RDW RBC AUTO: 42.5 FL (ref 37–54)
EGFRCR SERPLBLD CKD-EPI 2021: 113.2 ML/MIN/1.73
EOSINOPHIL # BLD AUTO: 0.59 10*3/MM3 (ref 0–0.4)
EOSINOPHIL NFR BLD AUTO: 3.7 % (ref 0.3–6.2)
ERYTHROCYTE [DISTWIDTH] IN BLOOD BY AUTOMATED COUNT: 12.9 % (ref 12.3–15.4)
GLUCOSE SERPL-MCNC: 168 MG/DL (ref 65–99)
GRAM STN SPEC: NORMAL
HCT VFR BLD AUTO: 39.4 % (ref 37.5–51)
HGB BLD-MCNC: 12.5 G/DL (ref 13–17.7)
IMM GRANULOCYTES # BLD AUTO: 0.08 10*3/MM3 (ref 0–0.05)
IMM GRANULOCYTES NFR BLD AUTO: 0.5 % (ref 0–0.5)
LYMPHOCYTES # BLD AUTO: 2.57 10*3/MM3 (ref 0.7–3.1)
LYMPHOCYTES NFR BLD AUTO: 15.9 % (ref 19.6–45.3)
MAGNESIUM SERPL-MCNC: 2 MG/DL (ref 1.6–2.6)
MCH RBC QN AUTO: 28.7 PG (ref 26.6–33)
MCHC RBC AUTO-ENTMCNC: 31.7 G/DL (ref 31.5–35.7)
MCV RBC AUTO: 90.6 FL (ref 79–97)
MONOCYTES # BLD AUTO: 1.09 10*3/MM3 (ref 0.1–0.9)
MONOCYTES NFR BLD AUTO: 6.7 % (ref 5–12)
NEUTROPHILS NFR BLD AUTO: 11.72 10*3/MM3 (ref 1.7–7)
NEUTROPHILS NFR BLD AUTO: 72.6 % (ref 42.7–76)
NRBC BLD AUTO-RTO: 0 /100 WBC (ref 0–0.2)
PHOSPHATE SERPL-MCNC: 3.7 MG/DL (ref 2.5–4.5)
PLATELET # BLD AUTO: 670 10*3/MM3 (ref 140–450)
PMV BLD AUTO: 9.4 FL (ref 6–12)
POTASSIUM SERPL-SCNC: 4.7 MMOL/L (ref 3.5–5.2)
PROT SERPL-MCNC: 7 G/DL (ref 6–8.5)
RBC # BLD AUTO: 4.35 10*6/MM3 (ref 4.14–5.8)
SODIUM SERPL-SCNC: 135 MMOL/L (ref 136–145)
WBC NRBC COR # BLD AUTO: 16.15 10*3/MM3 (ref 3.4–10.8)

## 2024-09-19 PROCEDURE — 25010000002 CEFTRIAXONE PER 250 MG: Performed by: FAMILY MEDICINE

## 2024-09-19 PROCEDURE — 80076 HEPATIC FUNCTION PANEL: CPT | Performed by: FAMILY MEDICINE

## 2024-09-19 PROCEDURE — 99239 HOSP IP/OBS DSCHRG MGMT >30: CPT | Performed by: FAMILY MEDICINE

## 2024-09-19 PROCEDURE — 94799 UNLISTED PULMONARY SVC/PX: CPT

## 2024-09-19 PROCEDURE — 84100 ASSAY OF PHOSPHORUS: CPT | Performed by: FAMILY MEDICINE

## 2024-09-19 PROCEDURE — 80048 BASIC METABOLIC PNL TOTAL CA: CPT | Performed by: FAMILY MEDICINE

## 2024-09-19 PROCEDURE — 94664 DEMO&/EVAL PT USE INHALER: CPT

## 2024-09-19 PROCEDURE — 85025 COMPLETE CBC W/AUTO DIFF WBC: CPT | Performed by: FAMILY MEDICINE

## 2024-09-19 PROCEDURE — 99214 OFFICE O/P EST MOD 30 MIN: CPT | Performed by: INTERNAL MEDICINE

## 2024-09-19 PROCEDURE — 83735 ASSAY OF MAGNESIUM: CPT | Performed by: FAMILY MEDICINE

## 2024-09-19 PROCEDURE — 63710000001 REVEFENACIN 175 MCG/3ML SOLUTION: Performed by: STUDENT IN AN ORGANIZED HEALTH CARE EDUCATION/TRAINING PROGRAM

## 2024-09-19 PROCEDURE — G0378 HOSPITAL OBSERVATION PER HR: HCPCS

## 2024-09-19 RX ORDER — ARFORMOTEROL TARTRATE 15 UG/2ML
15 SOLUTION RESPIRATORY (INHALATION)
Qty: 120 ML | Refills: 0 | Status: SHIPPED | OUTPATIENT
Start: 2024-09-19 | End: 2024-10-19

## 2024-09-19 RX ORDER — BUDESONIDE 0.5 MG/2ML
0.5 INHALANT ORAL
Qty: 120 ML | Refills: 0 | Status: SHIPPED | OUTPATIENT
Start: 2024-09-19 | End: 2024-10-19

## 2024-09-19 RX ORDER — ALBUTEROL SULFATE 0.83 MG/ML
2.5 SOLUTION RESPIRATORY (INHALATION) EVERY 4 HOURS PRN
Qty: 360 ML | Refills: 0 | Status: SHIPPED | OUTPATIENT
Start: 2024-09-19 | End: 2024-10-19

## 2024-09-19 RX ADMIN — ARFORMOTEROL TARTRATE 15 MCG: 15 SOLUTION RESPIRATORY (INHALATION) at 08:18

## 2024-09-19 RX ADMIN — PANCRELIPASE 6000 UNITS OF LIPASE: 30000; 6000; 19000 CAPSULE, DELAYED RELEASE PELLETS ORAL at 10:41

## 2024-09-19 RX ADMIN — REVEFENACIN 175 MCG: 175 SOLUTION RESPIRATORY (INHALATION) at 08:18

## 2024-09-19 RX ADMIN — BUDESONIDE 0.5 MG: 0.5 SUSPENSION RESPIRATORY (INHALATION) at 08:18

## 2024-09-19 RX ADMIN — CEFTRIAXONE SODIUM 2000 MG: 2 INJECTION, POWDER, FOR SOLUTION INTRAMUSCULAR; INTRAVENOUS at 06:45

## 2024-09-20 ENCOUNTER — TRANSITIONAL CARE MANAGEMENT TELEPHONE ENCOUNTER (OUTPATIENT)
Dept: CALL CENTER | Facility: HOSPITAL | Age: 29
End: 2024-09-20
Payer: COMMERCIAL

## 2024-09-20 LAB
BACTERIA SPEC RESP CULT: ABNORMAL
BACTERIA SPEC RESP CULT: ABNORMAL
GRAM STN SPEC: ABNORMAL

## 2024-09-20 RX ORDER — DOXYCYCLINE 100 MG/1
100 CAPSULE ORAL 2 TIMES DAILY
Qty: 28 CAPSULE | Refills: 0 | Status: SHIPPED | OUTPATIENT
Start: 2024-09-20 | End: 2024-10-04

## 2024-09-21 ENCOUNTER — TRANSITIONAL CARE MANAGEMENT TELEPHONE ENCOUNTER (OUTPATIENT)
Dept: CALL CENTER | Facility: HOSPITAL | Age: 29
End: 2024-09-21
Payer: COMMERCIAL

## 2024-10-11 ENCOUNTER — TELEPHONE (OUTPATIENT)
Dept: GASTROENTEROLOGY | Facility: CLINIC | Age: 29
End: 2024-10-11

## 2024-10-11 NOTE — TELEPHONE ENCOUNTER
Attempted to contact Marcus Jensen 1995 regarding the appointment no show with DARINEL Staples on 10/11/24 @ 8:15 am . Patient is aware that there is a 24-hour cancellation policyDATE and understands that a no-show letter will be mailed to them at the address on file. Unable to leave message Phone # in EPIC is invalid

## 2024-11-18 NOTE — PROGRESS NOTES
Primary Care Provider  Basilia Church APRN   Referring Provider  No ref. provider found    Patient Complaint  Follow-up, Cystic Fibrosis, Shortness of Breath, and Cough (Productive cough)      Subjective       History of Presenting Illness  Marcus Jensen is a pleasant 29 y.o. male who presents to [facility] for follow-up appointment.  Patient is here with stepmother.  I last saw the patient 11/21/2023. Mr. Jensen  has a diagnosis of cystic fibrosis from childhood and was followed at [facility] cystic fibrosis clinic until several years back when he was transition to adult clinic.  He states he has not followed up with them in several years.  He has a history of having MSSA pneumonia and acute hypoxic respiratory failure in 2019.  Initially he has bronchiectasis with impaired airway clearance, pancreatic insufficiency.  He had been on Creon but has run out and is asking today.  He is not under the care of gastroenterology for his pancreatic insufficiency.  He had a hospitalization in September at [facility] for abdominal pain.  Sputum culture came back positive for staph growth, MRSA, and he was treated with doxycycline.  Patient has a history of noncompliance and multiple no-show for appointments with pulmonology.    At present time patient denies dyspnea,  wheezing, headaches, chest pain, weight loss or hemoptysis. Patient denies fevers, chills and night sweats. Marcus Jensen is able to perform ADLs without difficulties.  He states he does have a chest vest at home and he does use it for 10 minutes once a day.  He does have a dry cough.  He does continue to chew tobacco and use marijuana daily.    I have personally reviewed the review of systems, past family, social, medical and surgical histories; and agree with their findings.      Review of Systems   Constitutional: Negative.    HENT: Negative.     Respiratory:  Positive for  "cough.    Cardiovascular: Negative.    Musculoskeletal: Negative.    Neurological: Negative.    Psychiatric/Behavioral: Negative.           Family History   Problem Relation Age of Onset   • Asthma Sister         Social History     Socioeconomic History   • Marital status:    Tobacco Use   • Smoking status: Never     Passive exposure: Never   • Smokeless tobacco: Current     Types: Chew   Vaping Use   • Vaping status: Never Used   Substance and Sexual Activity   • Alcohol use: Not Currently     Comment: About a year   • Drug use: Yes     Types: Marijuana     Comment: once a month   • Sexual activity: Not Currently        Past Medical History:   Diagnosis Date   • Cystic fibrosis    • Head injury         Immunization History   Administered Date(s) Administered   • DTP 1996   • DTaP, Unspecified 06/12/1997, 01/28/1998, 08/08/2000   • Hep B, Adolescent or Pediatric 1996, 1996, 01/28/1998   • HiB 06/12/1997, 01/28/1998   • Hib (HbOC) 1996   • IPV 06/12/1997, 08/08/2000   • MMR 08/08/2000, 10/09/2000   • Meningococcal Polysaccharide 08/05/2009   • OPV 1996   • Tdap 08/05/2009       No Known Allergies       Current Outpatient Medications:   •  budesonide (PULMICORT) 0.5 MG/2ML nebulizer solution, Take 2 mL by nebulization 2 (Two) Times a Day for 30 days., Disp: 120 mL, Rfl: 0  •  pancrelipase, Lip-Prot-Amyl, (CREON) 6000-32066 units capsule delayed-release particles capsule, Take 1 capsule by mouth 3 (Three) Times a Day With Meals., Disp: 90 capsule, Rfl: 2         Vital Signs   /84 (BP Location: Right arm, Patient Position: Sitting, Cuff Size: Adult)   Pulse 79   Temp 97.6 °F (36.4 °C)   Resp 16   Ht 154.9 cm (61\")   Wt 58.5 kg (129 lb)   SpO2 96%   BMI 24.37 kg/m²       Objective     Physical Exam  Vitals reviewed.   Constitutional:       Appearance: Normal appearance.   HENT:      Head: Normocephalic and atraumatic.      Nose: Nose normal.      Mouth/Throat:      " Mouth: Mucous membranes are moist.      Pharynx: Oropharynx is clear.   Eyes:      Extraocular Movements: Extraocular movements intact.      Conjunctiva/sclera: Conjunctivae normal.      Pupils: Pupils are equal, round, and reactive to light.   Cardiovascular:      Rate and Rhythm: Normal rate and regular rhythm.      Pulses: Normal pulses.      Heart sounds: Normal heart sounds.   Pulmonary:      Effort: Pulmonary effort is normal.      Breath sounds: Normal breath sounds.   Abdominal:      General: Bowel sounds are normal.   Musculoskeletal:         General: Normal range of motion.      Cervical back: Normal range of motion and neck supple.   Skin:     General: Skin is warm and dry.   Neurological:      Mental Status: He is alert and oriented to person, place, and time.   Psychiatric:         Behavior: Behavior normal.         Results Review  I have personally reviewed the prior office notes, hospital records, labs, and diagnostics.    Assessment         Patient Active Problem List   Diagnosis   • Cystic fibrosis with pulmonary manifestations   • Bronchiectasis without acute exacerbation   • Sepsis, due to unspecified organism, unspecified whether acute organ dysfunction present   • Acute cough   • Bronchitis   • Multifocal pneumonia        Plan     Diagnoses and all orders for this visit:    1. Cystic fibrosis with pulmonary manifestations (Primary)  Comments:  Referral placed to [facility] cystic fibrosis clinic for management of cystic fibrosis    2. Bronchiectasis without acute exacerbation  Comments:  Encourage patient in use of chest vest therapy for airway clearance twice daily for 10 to 20 minutes at a time.    3. Poor compliance with medication    4. Tobacco chew use    5. Marijuana abuse    6. Pancreatic insufficiency due to cystic fibrosis  -     Ambulatory Referral to Gastroenterology  -     pancrelipase, Lip-Prot-Amyl, (CREON) 6000-21278 units capsule delayed-release particles capsule;  Take 1 capsule by mouth 3 (Three) Times a Day With Meals.  Dispense: 90 capsule; Refill: 2       6.  Refill on Creon sent to his pharmacy.  Referral to gastroenterology for pancreatic insufficiency.  Referral to [facility] cystic fibrosis clinic for management of his cystic fibrosis.      Smoking status:  reports that he has never smoked. He has never been exposed to tobacco smoke. His smokeless tobacco use includes chew.    Vaccination status: Reviewed  Immunization History   Administered Date(s) Administered   • DTP 1996   • DTaP, Unspecified 06/12/1997, 01/28/1998, 08/08/2000   • Hep B, Adolescent or Pediatric 1996, 1996, 01/28/1998   • HiB 06/12/1997, 01/28/1998   • Hib (HbOC) 1996   • IPV 06/12/1997, 08/08/2000   • MMR 08/08/2000, 10/09/2000   • Meningococcal Polysaccharide 08/05/2009   • OPV 1996   • Tdap 08/05/2009        Medications personally reviewed    Follow Up  Return if symptoms worsen or fail to improve.    Patient was given instructions and counseling regarding his condition or for health maintenance advice. Please see specific information pulled into the AVS if appropriate.     I spent 24 minutes caring for Marcus Jensen on this date of service. This time includes time spent by me in the following activities:preparing for the visit, reviewing tests, obtaining and/or reviewing a separately obtained history, performing a medically appropriate examination and/or evaluation, counseling and educating the patient/family/caregiver, ordering medications, tests, or procedures, documenting information in the medical record, independently interpreting results and communicating that information with the patient/family/caregiver and answered questions family members, discuss medications.

## 2024-11-19 ENCOUNTER — OFFICE VISIT (OUTPATIENT)
Dept: PULMONOLOGY | Facility: CLINIC | Age: 29
End: 2024-11-19
Payer: COMMERCIAL

## 2024-11-19 VITALS
SYSTOLIC BLOOD PRESSURE: 132 MMHG | OXYGEN SATURATION: 96 % | HEART RATE: 79 BPM | BODY MASS INDEX: 24.35 KG/M2 | WEIGHT: 129 LBS | TEMPERATURE: 97.6 F | HEIGHT: 61 IN | RESPIRATION RATE: 16 BRPM | DIASTOLIC BLOOD PRESSURE: 84 MMHG

## 2024-11-19 DIAGNOSIS — Z91.148 POOR COMPLIANCE WITH MEDICATION: ICD-10-CM

## 2024-11-19 DIAGNOSIS — J47.9 BRONCHIECTASIS WITHOUT ACUTE EXACERBATION: ICD-10-CM

## 2024-11-19 DIAGNOSIS — K86.89 PANCREATIC INSUFFICIENCY DUE TO CYSTIC FIBROSIS: ICD-10-CM

## 2024-11-19 DIAGNOSIS — E84.0 CYSTIC FIBROSIS WITH PULMONARY MANIFESTATIONS: Primary | ICD-10-CM

## 2024-11-19 DIAGNOSIS — F12.10 MARIJUANA ABUSE: ICD-10-CM

## 2024-11-19 DIAGNOSIS — Z72.0 TOBACCO CHEW USE: ICD-10-CM

## 2024-11-19 DIAGNOSIS — E84.8 PANCREATIC INSUFFICIENCY DUE TO CYSTIC FIBROSIS: ICD-10-CM

## 2024-11-19 PROCEDURE — 1160F RVW MEDS BY RX/DR IN RCRD: CPT | Performed by: NURSE PRACTITIONER

## 2024-11-19 PROCEDURE — 1159F MED LIST DOCD IN RCRD: CPT | Performed by: NURSE PRACTITIONER

## 2024-11-19 PROCEDURE — 99214 OFFICE O/P EST MOD 30 MIN: CPT | Performed by: NURSE PRACTITIONER

## 2024-11-25 DIAGNOSIS — E84.0 CYSTIC FIBROSIS WITH PULMONARY MANIFESTATIONS: Primary | ICD-10-CM

## 2024-12-05 ENCOUNTER — OFFICE VISIT (OUTPATIENT)
Dept: INTERNAL MEDICINE | Age: 29
End: 2024-12-05
Payer: COMMERCIAL

## 2024-12-05 VITALS
DIASTOLIC BLOOD PRESSURE: 70 MMHG | HEIGHT: 61 IN | OXYGEN SATURATION: 95 % | TEMPERATURE: 97.8 F | BODY MASS INDEX: 20.77 KG/M2 | WEIGHT: 110 LBS | HEART RATE: 114 BPM | SYSTOLIC BLOOD PRESSURE: 114 MMHG

## 2024-12-05 DIAGNOSIS — J44.0 COPD WITH LOWER RESPIRATORY INFECTION: ICD-10-CM

## 2024-12-05 DIAGNOSIS — E84.0 CYSTIC FIBROSIS WITH PULMONARY MANIFESTATIONS: ICD-10-CM

## 2024-12-05 DIAGNOSIS — R05.1 ACUTE COUGH: Primary | ICD-10-CM

## 2024-12-05 LAB
EXPIRATION DATE: NORMAL
EXPIRATION DATE: NORMAL
FLUAV AG UPPER RESP QL IA.RAPID: NOT DETECTED
FLUBV AG UPPER RESP QL IA.RAPID: NOT DETECTED
INTERNAL CONTROL: NORMAL
INTERNAL CONTROL: NORMAL
Lab: NORMAL
Lab: NORMAL
S PYO AG THROAT QL: NEGATIVE
SARS-COV-2 AG UPPER RESP QL IA.RAPID: NOT DETECTED

## 2024-12-05 PROCEDURE — 1159F MED LIST DOCD IN RCRD: CPT | Performed by: NURSE PRACTITIONER

## 2024-12-05 PROCEDURE — 1160F RVW MEDS BY RX/DR IN RCRD: CPT | Performed by: NURSE PRACTITIONER

## 2024-12-05 PROCEDURE — 87428 SARSCOV & INF VIR A&B AG IA: CPT | Performed by: NURSE PRACTITIONER

## 2024-12-05 PROCEDURE — 1126F AMNT PAIN NOTED NONE PRSNT: CPT | Performed by: NURSE PRACTITIONER

## 2024-12-05 PROCEDURE — 87880 STREP A ASSAY W/OPTIC: CPT | Performed by: NURSE PRACTITIONER

## 2024-12-05 PROCEDURE — 99214 OFFICE O/P EST MOD 30 MIN: CPT | Performed by: NURSE PRACTITIONER

## 2024-12-05 RX ORDER — BUDESONIDE 0.5 MG/2ML
0.5 INHALANT ORAL
Qty: 120 ML | Refills: 0 | Status: SHIPPED | OUTPATIENT
Start: 2024-12-05 | End: 2025-01-04

## 2024-12-05 RX ORDER — AZITHROMYCIN 250 MG/1
TABLET, FILM COATED ORAL
Qty: 6 TABLET | Refills: 0 | Status: SHIPPED | OUTPATIENT
Start: 2024-12-05

## 2024-12-05 RX ORDER — GUAIFENESIN AND DEXTROMETHORPHAN HYDROBROMIDE 600; 30 MG/1; MG/1
2 TABLET, EXTENDED RELEASE ORAL 2 TIMES DAILY PRN
Qty: 30 TABLET | Refills: 0 | Status: SHIPPED | OUTPATIENT
Start: 2024-12-05

## 2024-12-05 NOTE — PROGRESS NOTES
"Chief Complaint  Cough (Coughing about a week Pt sister states everyone in her house just had strep but pt is prone to pneumonia die to cystic fibrosis )    Subjective      Marcus Jensen is a 29 y.o. male who presents to [facility]     History of Present Illness  The patient presents for evaluation of cough, congestion, and sore throat. He is accompanied by an adult female.  His sister.  He has a history of Cystic Fibrosis    He is accompanied by his sister who states their household that has had a lot of stress recently he himself has been experiencing symptoms of cough, congestion, and sore throat for approximately a week. He  has been producing a significant amount of thick phlegm when coughing. He has a history of cystic fibrosis and is currently using a nebulizer breathing machine a wearing his vest.. He is under the care of a pulmonologist.  He does have shortness of air with exertion today in the office room air sats are 95%.  Tested for COVID strep and flu all are negative.  Afebrile he denies any fever chills but does have increased shortness of air with exertion.  Sister states he has not been taking the nebulizer treatments as often as she should.  Encouraged to take every 4-6 hours as needed.  He request refill on his Pulmicort         Objective   Vital Signs:   Vitals:    12/05/24 1357   BP: 114/70   BP Location: Left arm   Patient Position: Sitting   Cuff Size: Adult   Pulse: 114   Temp: 97.8 °F (36.6 °C)   TempSrc: Skin   SpO2: 95%   Weight: 49.9 kg (110 lb)   Height: 154.9 cm (60.98\")     Body mass index is 20.8 kg/m².    Wt Readings from Last 3 Encounters:   12/05/24 49.9 kg (110 lb)   11/19/24 58.5 kg (129 lb)   09/17/24 58.7 kg (129 lb 6.6 oz)     BP Readings from Last 3 Encounters:   12/05/24 114/70   11/19/24 132/84   09/19/24 112/73       Health Maintenance   Topic Date Due    DIABETIC FOOT EXAM  Never done    DIABETIC EYE EXAM  Never done    HEMOGLOBIN A1C "  04/25/2024    INFLUENZA VACCINE  Never done    ANNUAL PHYSICAL  08/23/2024    COVID-19 Vaccine (1 - 2024-25 season) Never done    URINE MICROALBUMIN  10/05/2024    Pneumococcal Vaccine 0-64 (1 of 2 - PCV) 02/26/2025 (Originally 6/1/2002)    TDAP/TD VACCINES (2 - Td or Tdap) 02/26/2025 (Originally 8/5/2019)    HEPATITIS C SCREENING  Completed    Hepatitis B  Completed       Physical Exam  Vitals and nursing note reviewed.   Constitutional:       Appearance: Normal appearance.   HENT:      Head: Normocephalic.   Eyes:      Extraocular Movements: Extraocular movements intact.      Pupils: Pupils are equal, round, and reactive to light.   Cardiovascular:      Rate and Rhythm: Normal rate and regular rhythm.      Pulses: Normal pulses.   Pulmonary:      Effort: Pulmonary effort is normal.      Comments: Bilateral breath sounds equal but decreased bilateral  Abdominal:      Palpations: Abdomen is soft.   Musculoskeletal:         General: Normal range of motion.      Cervical back: Normal range of motion.   Skin:     General: Skin is warm and dry.   Neurological:      General: No focal deficit present.      Mental Status: He is alert.   Psychiatric:         Mood and Affect: Mood normal.         Behavior: Behavior normal.         Thought Content: Thought content normal.          Physical Exam         Result Review :  The following data was reviewed by: DARINEL Martinez on 12/05/2024:    Labs  Office Visit on 12/05/2024   Component Date Value Ref Range Status    SARS Antigen 12/05/2024 Not Detected  Not Detected, Presumptive Negative Final    Influenza A Antigen TONJA 12/05/2024 Not Detected  Not Detected Final    Influenza B Antigen TONJA 12/05/2024 Not Detected  Not Detected Final    Internal Control 12/05/2024 Passed  Passed Final    Lot Number 12/05/2024 [id number]   Final    Expiration Date 12/05/2024 10/18/2025   Final    Rapid Strep A Screen 12/05/2024 Negative  Negative, VALID, INVALID, Not Performed Final     Internal Control 12/05/2024 Passed  Passed Final    Lot Number 12/05/2024 [id number]   Final    Expiration Date 12/05/2024 07/15/2025   Final        Imaging  CT Chest Without Contrast Diagnostic    Result Date: 9/17/2024  Impression: New, likely infectious/inflammatory opacities in the left upper lobe and to a lesser extent the right upper lobe and bilateral lower lobes superimposed on chronic changes related to cystic fibrosis. Electronically Signed: Jack Holly  9/17/2024 5:13 PM EDT  Workstation ID: [id number]    XR Chest 1 View    Result Date: 9/17/2024  Impression: Chronic bilateral upper lobe opacities presumably relating to the patient's history of cystic fibrosis. 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 2.Stable complete fatty replacement of the pancreas related to cystic fibrosis. Electronically Signed: Kaia Gibbs  9/17/2024 2:45 AM EDT  Workstation ID: [id number]      Results  Laboratory Studies  Tests for Covid, flu, and strep were negative.       Procedures         ASSESSMENT & PLAN  Diagnoses and all orders for this visit:    1. Acute cough (Primary)  -     POCT SARS-CoV-2 Antigen TONJA + Flu  -     POCT rapid strep A    2. Cystic fibrosis with pulmonary manifestations    3. COPD with lower respiratory infection    Other orders  -     budesonide (PULMICORT) 0.5 MG/2ML nebulizer solution; Take 2 mL by nebulization 2 (Two) Times a Day for 30 days.  Dispense: 120 mL; Refill: 0  -     azithromycin (Zithromax Z-Lei) 250 MG tablet; Take 2 tablets by mouth on day 1, then 1 tablet daily on days 2-5  Dispense: 6 tablet; Refill: 0  -      guaifenesin-dextromethorphan 600-30 mg (MUCINEX DM)  MG tablet sustained-release 12 hour; Take 2 tablets by mouth 2 (Two) Times a Day As Needed (cough).  Dispense: 30 tablet; Refill: 0         Assessment & Plan  1. Cough, congestion, and sore throat.  Cystic fibrosis exacerbation  Despite negative results for COVID-19, influenza, and strep, his symptoms persist. He was prescribed Z-Lei and Mucinex. He was advised to elevate his head while sleeping, perform a treatment before bedtime, and wear his vest.  Pulmicort was refilled. If he develops fever, increased shortness of breath, or persistent vomiting, he should go to the emergency room immediately. If symptoms do not improve in 2-3 days, he should return to the office or go to the emergency room.  He is to follow-up with pulmonology as scheduled.         BMI is within normal parameters. No other follow-up for BMI required.           FOLLOW UP  No follow-ups on file.  Patient was given instructions and counseling regarding his condition or for health maintenance advice. Please see specific information pulled into the AVS if appropriate.     Patient or patient representative verbalized consent for the use of Ambient Listening during the visit with  DARINEL Martinez for chart documentation. 12/5/2024  14:38 EST    DARINEL Martinez  12/05/24  14:39 EST

## 2024-12-09 ENCOUNTER — TELEPHONE (OUTPATIENT)
Dept: PULMONOLOGY | Facility: CLINIC | Age: 29
End: 2024-12-09
Payer: COMMERCIAL

## 2024-12-09 NOTE — TELEPHONE ENCOUNTER
Left message for Arlene on voice mail at [facility] ([phone]).  Expecting a return call to start patient referral.

## 2024-12-10 ENCOUNTER — TELEPHONE (OUTPATIENT)
Dept: PULMONOLOGY | Facility: CLINIC | Age: 29
End: 2024-12-10
Payer: COMMERCIAL

## 2024-12-13 ENCOUNTER — TELEPHONE (OUTPATIENT)
Dept: PULMONOLOGY | Facility: CLINIC | Age: 29
End: 2024-12-13
Payer: COMMERCIAL

## 2024-12-13 NOTE — TELEPHONE ENCOUNTER
Spoke with Arlene at [facility] cystic fibrosis clinic.  She has made contact with patient's step mother and will facilitate the referral going forward.

## 2024-12-29 ENCOUNTER — APPOINTMENT (OUTPATIENT)
Dept: CT IMAGING | Facility: HOSPITAL | Age: 29
End: 2024-12-29
Payer: COMMERCIAL

## 2024-12-29 ENCOUNTER — HOSPITAL ENCOUNTER (EMERGENCY)
Facility: HOSPITAL | Age: 29
Discharge: HOME | End: 2024-12-29
Attending: EMERGENCY MEDICINE | Admitting: EMERGENCY MEDICINE
Payer: COMMERCIAL

## 2024-12-29 VITALS
SYSTOLIC BLOOD PRESSURE: 108 MMHG | DIASTOLIC BLOOD PRESSURE: 73 MMHG | BODY MASS INDEX: 20.65 KG/M2 | WEIGHT: 109.35 LBS | RESPIRATION RATE: 16 BRPM | TEMPERATURE: 98.8 F | HEART RATE: 88 BPM | HEIGHT: 61 IN | OXYGEN SATURATION: 97 %

## 2024-12-29 DIAGNOSIS — K86.89 PANCREATIC ATROPHY: ICD-10-CM

## 2024-12-29 DIAGNOSIS — K56.609: Primary | ICD-10-CM

## 2024-12-29 LAB
ALBUMIN SERPL-MCNC: 4 G/DL (ref 3.5–5.2)
ALBUMIN/GLOB SERPL: 1.1 G/DL
ALP SERPL-CCNC: 256 U/L (ref 39–117)
ALT SERPL W P-5'-P-CCNC: 89 U/L (ref 1–41)
ANION GAP SERPL CALCULATED.3IONS-SCNC: 11.4 MMOL/L (ref 5–15)
AST SERPL-CCNC: 44 U/L (ref 1–40)
BASOPHILS # BLD AUTO: 0.07 10*3/MM3 (ref 0–0.2)
BASOPHILS NFR BLD AUTO: 0.6 % (ref 0–1.5)
BILIRUB SERPL-MCNC: 0.4 MG/DL (ref 0–1.2)
BUN SERPL-MCNC: 11 MG/DL (ref 6–20)
BUN/CREAT SERPL: 11.8 (ref 7–25)
CALCIUM SPEC-SCNC: 9.2 MG/DL (ref 8.6–10.5)
CHLORIDE SERPL-SCNC: 102 MMOL/L (ref 98–107)
CO2 SERPL-SCNC: 23.6 MMOL/L (ref 22–29)
CREAT SERPL-MCNC: 0.93 MG/DL (ref 0.76–1.27)
DEPRECATED RDW RBC AUTO: 47.9 FL (ref 37–54)
EGFRCR SERPLBLD CKD-EPI 2021: 114.7 ML/MIN/1.73
EOSINOPHIL # BLD AUTO: 0.53 10*3/MM3 (ref 0–0.4)
EOSINOPHIL NFR BLD AUTO: 4.2 % (ref 0.3–6.2)
ERYTHROCYTE [DISTWIDTH] IN BLOOD BY AUTOMATED COUNT: 14.3 % (ref 12.3–15.4)
GLOBULIN UR ELPH-MCNC: 3.7 GM/DL
GLUCOSE SERPL-MCNC: 126 MG/DL (ref 65–99)
HCT VFR BLD AUTO: 46.8 % (ref 37.5–51)
HGB BLD-MCNC: 14.6 G/DL (ref 13–17.7)
HOLD SPECIMEN: NORMAL
HOLD SPECIMEN: NORMAL
IMM GRANULOCYTES # BLD AUTO: 0.04 10*3/MM3 (ref 0–0.05)
IMM GRANULOCYTES NFR BLD AUTO: 0.3 % (ref 0–0.5)
LIPASE SERPL-CCNC: 5 U/L (ref 13–60)
LYMPHOCYTES # BLD AUTO: 1.68 10*3/MM3 (ref 0.7–3.1)
LYMPHOCYTES NFR BLD AUTO: 13.5 % (ref 19.6–45.3)
MCH RBC QN AUTO: 28.4 PG (ref 26.6–33)
MCHC RBC AUTO-ENTMCNC: 31.2 G/DL (ref 31.5–35.7)
MCV RBC AUTO: 91.1 FL (ref 79–97)
MONOCYTES # BLD AUTO: 0.69 10*3/MM3 (ref 0.1–0.9)
MONOCYTES NFR BLD AUTO: 5.5 % (ref 5–12)
NEUTROPHILS NFR BLD AUTO: 75.9 % (ref 42.7–76)
NEUTROPHILS NFR BLD AUTO: 9.48 10*3/MM3 (ref 1.7–7)
NRBC BLD AUTO-RTO: 0 /100 WBC (ref 0–0.2)
PLATELET # BLD AUTO: 328 10*3/MM3 (ref 140–450)
PMV BLD AUTO: 11.3 FL (ref 6–12)
POTASSIUM SERPL-SCNC: 4 MMOL/L (ref 3.5–5.2)
PROT SERPL-MCNC: 7.7 G/DL (ref 6–8.5)
RBC # BLD AUTO: 5.14 10*6/MM3 (ref 4.14–5.8)
SODIUM SERPL-SCNC: 137 MMOL/L (ref 136–145)
WBC NRBC COR # BLD AUTO: 12.49 10*3/MM3 (ref 3.4–10.8)
WHOLE BLOOD HOLD COAG: NORMAL
WHOLE BLOOD HOLD SPECIMEN: NORMAL

## 2024-12-29 PROCEDURE — 83690 ASSAY OF LIPASE: CPT | Performed by: EMERGENCY MEDICINE

## 2024-12-29 PROCEDURE — 74177 CT ABD & PELVIS W/CONTRAST: CPT

## 2024-12-29 PROCEDURE — 85025 COMPLETE CBC W/AUTO DIFF WBC: CPT | Performed by: EMERGENCY MEDICINE

## 2024-12-29 PROCEDURE — 25510000001 IOPAMIDOL PER 1 ML: Performed by: EMERGENCY MEDICINE

## 2024-12-29 PROCEDURE — 96374 THER/PROPH/DIAG INJ IV PUSH: CPT

## 2024-12-29 PROCEDURE — 36415 COLL VENOUS BLD VENIPUNCTURE: CPT

## 2024-12-29 PROCEDURE — 25010000002 KETOROLAC TROMETHAMINE PER 15 MG

## 2024-12-29 PROCEDURE — 80053 COMPREHEN METABOLIC PANEL: CPT | Performed by: EMERGENCY MEDICINE

## 2024-12-29 PROCEDURE — 25810000003 SODIUM CHLORIDE 0.9 % SOLUTION

## 2024-12-29 PROCEDURE — 99285 EMERGENCY DEPT VISIT HI MDM: CPT

## 2024-12-29 RX ORDER — SODIUM CHLORIDE 0.9 % (FLUSH) 0.9 %
10 SYRINGE (ML) INJECTION AS NEEDED
Status: DISCONTINUED | OUTPATIENT
Start: 2024-12-29 | End: 2024-12-29 | Stop reason: HOSPADM

## 2024-12-29 RX ORDER — ONDANSETRON 4 MG/1
4 TABLET, ORALLY DISINTEGRATING ORAL 4 TIMES DAILY PRN
Qty: 30 TABLET | Refills: 0 | Status: SHIPPED | OUTPATIENT
Start: 2024-12-29

## 2024-12-29 RX ORDER — IOPAMIDOL 755 MG/ML
100 INJECTION, SOLUTION INTRAVASCULAR
Status: COMPLETED | OUTPATIENT
Start: 2024-12-29 | End: 2024-12-29

## 2024-12-29 RX ORDER — KETOROLAC TROMETHAMINE 15 MG/ML
15 INJECTION, SOLUTION INTRAMUSCULAR; INTRAVENOUS ONCE
Status: COMPLETED | OUTPATIENT
Start: 2024-12-29 | End: 2024-12-29

## 2024-12-29 RX ADMIN — IOPAMIDOL 100 ML: 755 INJECTION, SOLUTION INTRAVENOUS at 10:40

## 2024-12-29 RX ADMIN — KETOROLAC TROMETHAMINE 15 MG: 15 INJECTION, SOLUTION INTRAMUSCULAR; INTRAVENOUS at 10:33

## 2024-12-29 RX ADMIN — SODIUM CHLORIDE 1000 ML: 9 INJECTION, SOLUTION INTRAVENOUS at 11:31

## 2024-12-29 NOTE — DISCHARGE INSTRUCTIONS
The stool burden on your CT completed today has improved from when you were seen in admit to the hospital in September.  Ensure that you are drinking plenty of fluids to stay well-hydrated and this will also improve stool burden.  Continue taking your Creon as prescribed.  Immediately return to the emergency department for intractable pain, intractable vomiting, or any new or worsening symptoms concerning to you.

## 2024-12-29 NOTE — ED PROVIDER NOTES
Time: 10:21 AM EST  Date of encounter:  12/29/2024  Independent Historian/Clinical History and Information was obtained by:   Patient    History is limited by: N/A    Chief Complaint: Abdominal pain      History of Present Illness:  Patient is a 29 y.o. year old male who presents to the emergency department for evaluation of abdominal pain.  Patient states he has been having intermittent generalized abdominal pain for the past 2 days.  He had 1 episode of vomiting this morning.  He denies any nausea at this time.  Patient denies any diarrhea, constipation, fever.  He does state that he began having dysuria this morning.  He denies any flank pain.  Patient denies any previous abdominal surgeries.  Patient has been afebrile.  Denies any cough or shortness of air.  No other complaints.      Patient Care Team  Primary Care Provider: Basilia Church APRN    Past Medical History:     No Known Allergies  Past Medical History:   Diagnosis Date    Cystic fibrosis     Head injury      Past Surgical History:   Procedure Laterality Date    BRONCHOSCOPY N/A 11/10/2022    Procedure: BRONCHOSCOPY WITH BRONCHOALVEOLAR LAVAGE, BRONCHIAL WASHINGS, AIRWAY INSPECTION;  Surgeon: Bright Worley MD;  Location: [facility] ENDOSCOPY;  Service: Pulmonary;  Laterality: N/A;  MUCUS PLUGGING     Family History   Problem Relation Age of Onset    Asthma Sister        Home Medications:  Prior to Admission medications    Medication Sig Start Date End Date Taking? Authorizing Provider   budesonide (PULMICORT) 0.5 MG/2ML nebulizer solution Take 2 mL by nebulization 2 (Two) Times a Day for 30 days. 12/5/24 1/4/25  Blossom Garcia APRN   pancrelipase, Lip-Prot-Amyl, (CREON) 6000-12390 units capsule delayed-release particles capsule Take 1 capsule by mouth 3 (Three) Times a Day With Meals. 11/19/24   Bernie Jackson APRN   azithromycin (Zithromax Z-Lei) 250 MG tablet Take 2 tablets by mouth on day 1, then 1 tablet daily on days 2-5 12/5/24 12/29/24  " Blossom Garcia APRN   guaifenesin-dextromethorphan 600-30 mg (MUCINEX DM)  MG tablet sustained-release 12 hour Take 2 tablets by mouth 2 (Two) Times a Day As Needed (cough). 12/5/24 12/29/24  Blossom Garcia APRN        Social History:   Social History     Tobacco Use    Smoking status: Never     Passive exposure: Never    Smokeless tobacco: Current     Types: Chew   Vaping Use    Vaping status: Every Day    Substances: THC    Devices: Disposable   Substance Use Topics    Alcohol use: Not Currently     Comment: About a year    Drug use: Yes     Types: Marijuana     Comment: once a month         Review of Systems:  Review of Systems   Constitutional:  Negative for chills and fever.   HENT:  Negative for ear pain and sore throat.    Respiratory:  Negative for cough and shortness of breath.    Gastrointestinal:  Positive for abdominal pain and vomiting. Negative for constipation, diarrhea and nausea.   Genitourinary:  Positive for dysuria. Negative for flank pain.   All other systems reviewed and are negative.       Physical Exam:  /78 (BP Location: Left arm, Patient Position: Lying)   Pulse 62   Temp 98.1 °F (36.7 °C)   Resp 18   Ht 154.9 cm (61\")   Wt 49.6 kg (109 lb 5.6 oz)   SpO2 96%   BMI 20.66 kg/m²     Physical Exam  Vitals and nursing note reviewed.   Constitutional:       General: He is not in acute distress.     Appearance: Normal appearance. He is well-developed and normal weight. He is not ill-appearing, toxic-appearing or diaphoretic.   HENT:      Head: Normocephalic and atraumatic.      Nose: Nose normal.   Eyes:      Conjunctiva/sclera: Conjunctivae normal.      Pupils: Pupils are equal, round, and reactive to light.   Cardiovascular:      Rate and Rhythm: Normal rate and regular rhythm.      Heart sounds: Normal heart sounds.   Pulmonary:      Effort: Pulmonary effort is normal.      Breath sounds: Normal breath sounds.   Abdominal:      General: Abdomen is flat. Bowel " sounds are normal.      Palpations: Abdomen is soft.      Tenderness: There is abdominal tenderness in the epigastric area, suprapubic area, left upper quadrant and left lower quadrant. There is guarding. There is no rebound. Negative signs include De La Rosa's sign, Rovsing's sign, McBurney's sign and obturator sign.   Musculoskeletal:         General: Normal range of motion.      Cervical back: Normal range of motion and neck supple.   Skin:     General: Skin is warm and dry.   Neurological:      General: No focal deficit present.      Mental Status: He is alert and oriented to person, place, and time.   Psychiatric:         Mood and Affect: Mood normal.         Behavior: Behavior normal.         Thought Content: Thought content normal.         Judgment: Judgment normal.                  Medical Decision Making:    Comorbidities that affect care:    Cystic fibrosis    External Notes reviewed:    Previous Admission Note: Admission note from 9- through 9- and Previous Radiological Studies: CT abdomen pelvis completed on 9-      The following orders were placed and all results were independently analyzed by me:  Orders Placed This Encounter   Procedures    CT Abdomen Pelvis With Contrast    [place] Draw    Comprehensive Metabolic Panel    Lipase    Urinalysis With Microscopic If Indicated (No Culture) - Urine, Clean Catch    CBC Auto Differential    NPO Diet NPO Type: Strict NPO    Undress & Gown    Inpatient Hospitalist Consult    IP General Consult (Use specialty-specific consult if known)    Insert Peripheral IV    CBC & Differential    Green Top (Gel)    Lavender Top    Gold Top - SST    Light Blue Top       Medications Given in the Emergency Department:  Medications   sodium chloride 0.9 % flush 10 mL (has no administration in time range)   ketorolac (TORADOL) injection 15 mg (15 mg Intravenous Given 12/29/24 1033)   iopamidol (ISOVUE-370) 76 % injection 100 mL (100 mL Intravenous Given 12/29/24  1040)   sodium chloride 0.9 % bolus 1,000 mL (1,000 mL Intravenous New Bag 12/29/24 1131)        ED Course:    ED Course as of 12/29/24 1249   Sun Dec 29, 2024   1122 Discussed patient with supervising physician.  Recommends IV hydration and admission [MD]   1156 Discussed patient with Dr. Lee.  She states patient CT seems similar to last.  Will consult surgery to determine whether patient needs admission or not. [MD]   1207 Discussed patient with Dr. Kaba who states he is not familiar with care of patients with cystic fibrosis and patients with PERCY.  He recommends if patient needs admission to the transferred to another facility. [MD]   1222 I discussed patient again with supervising physician.  Since patient is appearing well and not had any nausea and vomiting here he states he discussed with patient about discharge home with nausea medications and encouraging fluids versus transfer or I can also speak with gastroenterology. [MD]   1244 I discussed with patient and he states that he feels fine to go home.  Return to the emergency department guidelines provided.  Will discharge patient home with Zofran as well. [MD]      ED Course User Index  [MD] fEra Steiner PA-C       Labs:    Lab Results (last 24 hours)       Procedure Component Value Units Date/Time    CBC & Differential [[id number]]  (Abnormal) Collected: 12/29/24 0957    Specimen: Blood from Arm, Right Updated: 12/29/24 1015    Narrative:      The following orders were created for panel order CBC & Differential.  Procedure                               Abnormality         Status                     ---------                               -----------         ------                     CBC Auto Differential[[id number]]        Abnormal            Final result                 Please view results for these tests on the individual orders.    Comprehensive Metabolic Panel [[id number]]  (Abnormal) Collected: 12/29/24 0957    Specimen: Blood from Arm,  Right Updated: 12/29/24 1036     Glucose 126 mg/dL      BUN 11 mg/dL      Creatinine 0.93 mg/dL      Sodium 137 mmol/L      Potassium 4.0 mmol/L      Chloride 102 mmol/L      CO2 23.6 mmol/L      Calcium 9.2 mg/dL      Total Protein 7.7 g/dL      Albumin 4.0 g/dL      ALT (SGPT) 89 U/L      AST (SGOT) 44 U/L      Alkaline Phosphatase 256 U/L      Total Bilirubin 0.4 mg/dL      Globulin 3.7 gm/dL      A/G Ratio 1.1 g/dL      BUN/Creatinine Ratio 11.8     Anion Gap 11.4 mmol/L      eGFR 114.7 mL/min/1.73     Narrative:      GFR Categories in Chronic Kidney Disease (CKD)      GFR Category          GFR (mL/min/1.73)    Interpretation  G1                     90 or greater         Normal or high (1)  G2                      60-89                Mild decrease (1)  G3a                   45-59                Mild to moderate decrease  G3b                   30-44                Moderate to severe decrease  G4                    15-29                Severe decrease  G5                    14 or less           Kidney failure          (1)In the absence of evidence of kidney disease, neither GFR category G1 or G2 fulfill the criteria for CKD.    eGFR calculation 2021 CKD-EPI creatinine equation, which does not include race as a factor    Lipase [[id number]]  (Abnormal) Collected: 12/29/24 0957    Specimen: Blood from Arm, Right Updated: 12/29/24 1036     Lipase 5 U/L     CBC Auto Differential [[id number]]  (Abnormal) Collected: 12/29/24 0957    Specimen: Blood from Arm, Right Updated: 12/29/24 1015     WBC 12.49 10*3/mm3      RBC 5.14 10*6/mm3      Hemoglobin 14.6 g/dL      Hematocrit 46.8 %      MCV 91.1 fL      MCH 28.4 pg      MCHC 31.2 g/dL      RDW 14.3 %      RDW-SD 47.9 fl      MPV 11.3 fL      Platelets 328 10*3/mm3      Neutrophil % 75.9 %      Lymphocyte % 13.5 %      Monocyte % 5.5 %      Eosinophil % 4.2 %      Basophil % 0.6 %      Immature Grans % 0.3 %      Neutrophils, Absolute 9.48 10*3/mm3      Lymphocytes,  Absolute 1.68 10*3/mm3      Monocytes, Absolute 0.69 10*3/mm3      Eosinophils, Absolute 0.53 10*3/mm3      Basophils, Absolute 0.07 10*3/mm3      Immature Grans, Absolute 0.04 10*3/mm3      nRBC 0.0 /100 WBC              Imaging:    CT Abdomen Pelvis With Contrast    Result Date: 12/29/2024  CT ABDOMEN PELVIS W CONTRAST Date of Exam: 12/29/2024 10:39 AM EST Indication: Generalized abdominal pain x 2 days. Comparison: CT abdomen and pelvis 9/17/2024 Technique: Axial CT images were obtained of the abdomen and pelvis after the uneventful intravenous administration of iodinated contrast. Reconstructed coronal and sagittal images were also obtained. Automated exposure control and iterative construction methods were used. Findings: Bronchiectasis is again seen at the lung bases, with mucous plugging consistent with the patient's history of cystic fibrosis. No consolidation or mass is evident. The liver is of normal size. The liver is of diffusely diminished density consistent with mild fatty infiltration. The gallbladder is not abnormally distended. The pancreas is atrophic, with diffuse fatty replacement. No adrenal mass is seen. The spleen is of normal size. The kidneys enhance bilaterally. No renal or ureteral stones are seen. There is no evidence of hydronephrosis. The urinary bladder is not abnormally distended. The stomach is not abnormally distended. A moderate amount of stool is seen in the colon. Abnormally dilated loops of the ileum and distal jejunum containing fecal material, and measure up to 4 cm in diameter. Diffuse wall thickening of the small bowel and colon measures up to 5 mm. Similar findings are evident on 9/17/2024, although less fecal material is seen in the colon on today's study. Findings remain concerning for distal intestinal obstruction syndrome related to cystic fibrosis. No pneumatosis is evident. No free air or peritoneal fluid is seen. The anterior abdominal wall is intact, no hernia is  evident. Bony structures appear intact.     Impression: CT scan of the abdomen and pelvis with IV contrast demonstrating findings at the lung bases consistent with cystic fibrosis. Pancreatic atrophy with fatty replacement. Abnormal small and large bowel with findings concerning for distal intestinal obstruction syndrome related to cystic fibrosis. Electronically Signed: Niko Galvez MD  12/29/2024 11:11 AM EST  Workstation ID: [id number]       Differential Diagnosis and Discussion:    Abdominal Pain: Based on the patient's signs and symptoms, I considered abdominal aortic aneurysm, small bowel obstruction, pancreatitis, acute cholecystitis, acute appendecitis, peptic ulcer disease, gastritis, colitis, endocrine disorders, irritable bowel syndrome and other differential diagnosis an etiology of the patient's abdominal pain.    PROCEDURES:    Labs were collected in the emergency department and all labs were reviewed and interpreted by me.  CT scan was performed in the emergency department and the CT scan radiology impression was interpreted by me.    No orders to display       Procedures    MDM  Number of Diagnoses or Management Options  PERCY (distal intestinal obstruction syndrome)  Pancreatic atrophy  Diagnosis management comments: Patient presented to the emergency department today for evaluation of abdominal pain.CBC notes white blood cell count of 12.49.  CMP notes glucose of 126, ALT of 89, AST of 44, alk phos of 256, normal bilirubin.  Patient does have history of elevated liver enzymes and this is likely related to cystic fibrosis.  Lipase of 5.  Chronic finding for patient.  CT abdomen pelvis notes lung findings consistent with cystic fibrosis, pancreatic atrophy with fatty replacement, and findings consistent with DI OS.  The stool burden on CT has decreased from 9-.  Discussed patient with supervising physician who stated that due to the DOS patient may need admission to the hospital but I went  ahead and began fluid supplementation.  Discussed patient with Dr. Berry hospitalist who stated before further admission consideration patient is to be discussed with Dr. Kaba with general surgery.  Discussed patient with Dr. Kaba who states he is not familiar with treating the OS with cystic fibrosis patients and states if the patient needs admission he would have to be transferred.  Discussed patient again with supervising physician who states the patient is well-appearing due to the decreased stool burden on CT patient will be discharged home with Zofran otherwise may discuss patient with gastroenterology.  I discussed with patient at that time and completed shared decision making and patient would like to be discharged home.  Return to the emergency department guidelines provided.       Amount and/or Complexity of Data Reviewed  Clinical lab tests: reviewed and ordered  Tests in the radiology section of CPT®: reviewed and ordered  Decide to obtain previous medical records or to obtain history from someone other than the patient: yes    Risk of Complications, Morbidity, and/or Mortality  Presenting problems: moderate  Diagnostic procedures: moderate  Management options: low    Patient Progress  Patient progress: stable       Patient Care Considerations:    CONSULT: I considered consulting gastroenterology, however patient would like to be discharged home      Consultants/Shared Management Plan:    I discussed patient with Dr. Lee, hospitalist, who states patient will need to be discussed with general surgery before hospitalist will consider admission  SHARED VISIT: I have discussed the case with my supervising physician, Dr. Dowd who states consult hospitalist for admission.  Once I consulted general surgery and they stated that if patient needs admission he will be have to be transferred, we discussed further of the CT abdomen pelvis which shows decree stool burden and he stated if patient feels  appropriate to be discharged home and looks well this can be offered to the patient with nausea medication, otherwise consult gastroenterology. The substantive portion of the medical decision was made by the attesting physician who made or approve the management plan and will take responsibility for the patient.  Clinical findings were discussed and ultimate disposition was made in consult with supervising physician.  Consultant: I have discussed the case with Dr. Kaba who states he is unfamiliar treating PERCY with cystic fibrosis patients and if patient needs admission he will need transfer    Social Determinants of Health:    Patient is independent, reliable, and has access to care.       Disposition and Care Coordination:    Discharged: I considered escalation of care by admitting this patient to the hospital, however Patient felt appropriate to be discharged home    I have explained the patient´s condition, diagnoses and treatment plan based on the information available to me at this time. I have answered questions and addressed any concerns. The patient has a good  understanding of the patient´s diagnosis, condition, and treatment plan as can be expected at this point. The vital signs have been stable. The patient´s condition is stable and appropriate for discharge from the emergency department.      The patient will pursue further outpatient evaluation with the primary care physician or other designated or consulting physician as outlined in the discharge instructions. They are agreeable to this plan of care and follow-up instructions have been explained in detail. The patient has received these instructions in written format and has expressed an understanding of the discharge instructions. The patient is aware that any significant change in condition or worsening of symptoms should prompt an immediate return to this or the closest emergency department or call to 911.  I have explained discharge  medications and the need for follow up with the patient/caretakers. This was also printed in the discharge instructions. Patient was discharged with the following medications and follow up:      Medication List        New Prescriptions      ondansetron ODT 4 MG disintegrating tablet  Commonly known as: ZOFRAN-ODT  Place 1 tablet on the tongue 4 (Four) Times a Day As Needed for Nausea or Vomiting.               Where to Get Your Medications        These medications were sent to [facility] - [address], [address] - [phone]  - [phone] [facility], [address], [facility] [address]      Phone: [phone]   ondansetron ODT 4 MG disintegrating tablet      Basilia Church APRN  [address] [place] [address]  [address]  [facility] [id number] [phone]             Final diagnoses:   PERCY (distal intestinal obstruction syndrome)   Pancreatic atrophy        ED Disposition       ED Disposition   Discharge    Condition   Stable    Comment   --               This medical record created using voice recognition software.             Efra Steiner PA-C  12/29/24 5884

## 2024-12-30 LAB — HBA1C MFR BLD: 5.9 % (ref 4.8–5.6)

## 2025-01-02 RX ORDER — BUDESONIDE 0.5 MG/2ML
0.5 INHALANT ORAL
Qty: 120 ML | Refills: 0 | OUTPATIENT
Start: 2025-01-02 | End: 2025-02-01

## 2025-01-14 ENCOUNTER — HOSPITAL ENCOUNTER (EMERGENCY)
Facility: HOSPITAL | Age: 30
Discharge: HOME | End: 2025-01-15
Attending: EMERGENCY MEDICINE | Admitting: EMERGENCY MEDICINE
Payer: COMMERCIAL

## 2025-01-14 ENCOUNTER — APPOINTMENT (OUTPATIENT)
Dept: GENERAL RADIOLOGY | Facility: HOSPITAL | Age: 30
End: 2025-01-14
Payer: COMMERCIAL

## 2025-01-14 VITALS
DIASTOLIC BLOOD PRESSURE: 78 MMHG | BODY MASS INDEX: 20.6 KG/M2 | HEIGHT: 61 IN | WEIGHT: 109.13 LBS | RESPIRATION RATE: 20 BRPM | HEART RATE: 105 BPM | TEMPERATURE: 98.2 F | OXYGEN SATURATION: 94 % | SYSTOLIC BLOOD PRESSURE: 109 MMHG

## 2025-01-14 DIAGNOSIS — Z86.39 HISTORY OF CYSTIC FIBROSIS: ICD-10-CM

## 2025-01-14 DIAGNOSIS — R79.89 ELEVATED LFTS: ICD-10-CM

## 2025-01-14 DIAGNOSIS — J18.9 PNEUMONIA OF LEFT LOWER LOBE DUE TO INFECTIOUS ORGANISM: Primary | ICD-10-CM

## 2025-01-14 DIAGNOSIS — D72.829 LEUKOCYTOSIS, UNSPECIFIED TYPE: ICD-10-CM

## 2025-01-14 LAB
ALBUMIN SERPL-MCNC: 2.8 G/DL (ref 3.5–5.2)
ALBUMIN/GLOB SERPL: 0.7 G/DL
ALP SERPL-CCNC: 217 U/L (ref 39–117)
ALT SERPL W P-5'-P-CCNC: 97 U/L (ref 1–41)
ANION GAP SERPL CALCULATED.3IONS-SCNC: 8.2 MMOL/L (ref 5–15)
AST SERPL-CCNC: 86 U/L (ref 1–40)
BASOPHILS # BLD AUTO: 0.12 10*3/MM3 (ref 0–0.2)
BASOPHILS NFR BLD AUTO: 0.6 % (ref 0–1.5)
BILIRUB SERPL-MCNC: 0.3 MG/DL (ref 0–1.2)
BUN SERPL-MCNC: 11 MG/DL (ref 6–20)
BUN/CREAT SERPL: 14.1 (ref 7–25)
CALCIUM SPEC-SCNC: 9.1 MG/DL (ref 8.6–10.5)
CHLORIDE SERPL-SCNC: 100 MMOL/L (ref 98–107)
CO2 SERPL-SCNC: 27.8 MMOL/L (ref 22–29)
CREAT SERPL-MCNC: 0.78 MG/DL (ref 0.76–1.27)
D-LACTATE SERPL-SCNC: 1.9 MMOL/L (ref 0.5–2)
DEPRECATED RDW RBC AUTO: 44.7 FL (ref 37–54)
EGFRCR SERPLBLD CKD-EPI 2021: 124.6 ML/MIN/1.73
EOSINOPHIL # BLD AUTO: 0.38 10*3/MM3 (ref 0–0.4)
EOSINOPHIL NFR BLD AUTO: 2 % (ref 0.3–6.2)
ERYTHROCYTE [DISTWIDTH] IN BLOOD BY AUTOMATED COUNT: 13.9 % (ref 12.3–15.4)
FLUAV SUBTYP SPEC NAA+PROBE: NOT DETECTED
FLUBV RNA ISLT QL NAA+PROBE: NOT DETECTED
GLOBULIN UR ELPH-MCNC: 4.2 GM/DL
GLUCOSE SERPL-MCNC: 205 MG/DL (ref 65–99)
HCT VFR BLD AUTO: 37.6 % (ref 37.5–51)
HETEROPH AB SER QL LA: NEGATIVE
HGB BLD-MCNC: 12.2 G/DL (ref 13–17.7)
IMM GRANULOCYTES # BLD AUTO: 0.09 10*3/MM3 (ref 0–0.05)
IMM GRANULOCYTES NFR BLD AUTO: 0.5 % (ref 0–0.5)
LYMPHOCYTES # BLD AUTO: 1.77 10*3/MM3 (ref 0.7–3.1)
LYMPHOCYTES NFR BLD AUTO: 9.4 % (ref 19.6–45.3)
MCH RBC QN AUTO: 28.5 PG (ref 26.6–33)
MCHC RBC AUTO-ENTMCNC: 32.4 G/DL (ref 31.5–35.7)
MCV RBC AUTO: 87.9 FL (ref 79–97)
MONOCYTES # BLD AUTO: 1.37 10*3/MM3 (ref 0.1–0.9)
MONOCYTES NFR BLD AUTO: 7.2 % (ref 5–12)
NEUTROPHILS NFR BLD AUTO: 15.2 10*3/MM3 (ref 1.7–7)
NEUTROPHILS NFR BLD AUTO: 80.3 % (ref 42.7–76)
NRBC BLD AUTO-RTO: 0 /100 WBC (ref 0–0.2)
PLATELET # BLD AUTO: 487 10*3/MM3 (ref 140–450)
PMV BLD AUTO: 10.2 FL (ref 6–12)
POTASSIUM SERPL-SCNC: 4.4 MMOL/L (ref 3.5–5.2)
PROT SERPL-MCNC: 7 G/DL (ref 6–8.5)
RBC # BLD AUTO: 4.28 10*6/MM3 (ref 4.14–5.8)
RSV RNA NPH QL NAA+NON-PROBE: NOT DETECTED
S PYO AG THROAT QL: NEGATIVE
SARS-COV-2 RNA RESP QL NAA+PROBE: NOT DETECTED
SODIUM SERPL-SCNC: 136 MMOL/L (ref 136–145)
WBC NRBC COR # BLD AUTO: 18.93 10*3/MM3 (ref 3.4–10.8)

## 2025-01-14 PROCEDURE — 94760 N-INVAS EAR/PLS OXIMETRY 1: CPT

## 2025-01-14 PROCEDURE — 85025 COMPLETE CBC W/AUTO DIFF WBC: CPT

## 2025-01-14 PROCEDURE — 94799 UNLISTED PULMONARY SVC/PX: CPT

## 2025-01-14 PROCEDURE — 94640 AIRWAY INHALATION TREATMENT: CPT

## 2025-01-14 PROCEDURE — 71045 X-RAY EXAM CHEST 1 VIEW: CPT

## 2025-01-14 PROCEDURE — 25810000003 SEPSIS FLUID NS 0.9 % SOLUTION

## 2025-01-14 PROCEDURE — 96374 THER/PROPH/DIAG INJ IV PUSH: CPT

## 2025-01-14 PROCEDURE — 36415 COLL VENOUS BLD VENIPUNCTURE: CPT

## 2025-01-14 PROCEDURE — 83605 ASSAY OF LACTIC ACID: CPT | Performed by: EMERGENCY MEDICINE

## 2025-01-14 PROCEDURE — 87081 CULTURE SCREEN ONLY: CPT

## 2025-01-14 PROCEDURE — 80053 COMPREHEN METABOLIC PANEL: CPT

## 2025-01-14 PROCEDURE — 86308 HETEROPHILE ANTIBODY SCREEN: CPT

## 2025-01-14 PROCEDURE — 87637 SARSCOV2&INF A&B&RSV AMP PRB: CPT

## 2025-01-14 PROCEDURE — 25010000002 CEFTRIAXONE PER 250 MG

## 2025-01-14 PROCEDURE — 99283 EMERGENCY DEPT VISIT LOW MDM: CPT

## 2025-01-14 PROCEDURE — 87040 BLOOD CULTURE FOR BACTERIA: CPT | Performed by: EMERGENCY MEDICINE

## 2025-01-14 PROCEDURE — 87880 STREP A ASSAY W/OPTIC: CPT

## 2025-01-14 RX ORDER — LEVOFLOXACIN 750 MG/1
750 TABLET, FILM COATED ORAL DAILY
Qty: 5 TABLET | Refills: 0 | Status: SHIPPED | OUTPATIENT
Start: 2025-01-14 | End: 2025-01-19

## 2025-01-14 RX ORDER — IPRATROPIUM BROMIDE AND ALBUTEROL SULFATE 2.5; .5 MG/3ML; MG/3ML
3 SOLUTION RESPIRATORY (INHALATION) ONCE
Status: COMPLETED | OUTPATIENT
Start: 2025-01-14 | End: 2025-01-14

## 2025-01-14 RX ORDER — AZITHROMYCIN 250 MG/1
TABLET, FILM COATED ORAL
Qty: 6 TABLET | Refills: 0 | Status: SHIPPED | OUTPATIENT
Start: 2025-01-14 | End: 2025-01-14 | Stop reason: SDUPTHER

## 2025-01-14 RX ORDER — BROMPHENIRAMINE MALEATE, PSEUDOEPHEDRINE HYDROCHLORIDE, AND DEXTROMETHORPHAN HYDROBROMIDE 2; 30; 10 MG/5ML; MG/5ML; MG/5ML
10 SYRUP ORAL ONCE
Status: COMPLETED | OUTPATIENT
Start: 2025-01-14 | End: 2025-01-14

## 2025-01-14 RX ADMIN — SODIUM CHLORIDE 1485 ML: 9 INJECTION, SOLUTION INTRAVENOUS at 22:24

## 2025-01-14 RX ADMIN — IPRATROPIUM BROMIDE AND ALBUTEROL SULFATE 3 ML: 2.5; .5 SOLUTION RESPIRATORY (INHALATION) at 22:31

## 2025-01-14 RX ADMIN — SODIUM CHLORIDE 2000 MG: 9 INJECTION INTRAMUSCULAR; INTRAVENOUS; SUBCUTANEOUS at 22:25

## 2025-01-14 RX ADMIN — BROMPHENIRAMINE MALEATE, PSEUDOEPHEDRINE HYDROCHLORIDE, AND DEXTROMETHORPHAN HYDROBROMIDE 10 ML: 2; 30; 10 SYRUP ORAL at 22:25

## 2025-01-15 NOTE — ED NOTES
No answer for room call at 2053   Ultrasound guided access, aortogram, angiogram of left lower ext, findings of focal areas of stenosis of AT, Pedal art, peroneal. angioplasty & atherectomy of AT, Pedal art, peroneal art. Ultrasound guided access, aortogram, angiogram of left lower ext, findings of focal areas of stenosis of AT, Pedal art, peroneal. angioplasty & atherectomy of SFA, AT, dorsalis Pedal art, peroneal art.  PT occluded and reconstitutes at ankle

## 2025-01-15 NOTE — DISCHARGE INSTRUCTIONS
Your COVID, flu and RSV swab are negative.  Your monotest was negative  Your liver enzymes are elevated but this was seen in lab work 2 weeks ago.  When you have a virus you can also have elevated LFTs.  Your white count is elevated at 18.  Your x-ray showed that you have left lower lobe pneumonia.  You have been given your first dose of IV antibiotics and fluids during your ED visit.  Please  antibiotics and take as prescribed until finished.  Please take cough medicine as needed.  Please drink lots of fluids and small increment such as water, Gatorade and Pedialyte.  Please use your breathing treatments every 4-6 hours for shortness of breath and follow-up with the PCP and your CF pulmonologist

## 2025-01-15 NOTE — ED PROVIDER NOTES
Time: 8:37 PM EST  Date of encounter:  1/14/2025  Independent Historian/Clinical History and Information was obtained by:   Patient and Family    History is limited by:  Prior head injury    Chief Complaint   Patient presents with    Fever     PT PRESENTS TO ED WITH FEVER THAT STARTED TODAY AND A COUGH FOR OVER A WEEK. PT HAS HX OF CYSTIC FIBROSIS. PT RECENTLY EXPOSED TO KNOWN RESPIRATORY ILLNESS.         History of Present Illness:  Patient is a 29 y.o. year old male who presents to the emergency department for evaluation of fever.  Patient has history of cystic fibrosis, and recently was diagnosed with pneumonia.  Patient has had a fever over the past day but has been exposed to COVID and flu.(Bailey Seaver, APRN, FNP-C)    Patient is 29-year-old female accompanied by his twin sister due to fever that started today.  Tmax was 103.1, Tylenol ibuprofen given today with last dose of Tylenol around 6:30 PM.  Patient has a history of cystic fibrosis and just recently saw a CF pulmonologist.  He was prescribed medication just today.  Sister states that he has been having a cough for the past week.  He states he had his productive of white sputum.  Has not taken any cough medicine in the last week.  Denies recent travel.  Sister and other family members have been sick for the last couple weeks.  Patient has a history of noncompliance.  He also chews tobacco.        Patient Care Team  Primary Care Provider: Basilia Church APRN    Past Medical History:     No Known Allergies  Past Medical History:   Diagnosis Date    Cystic fibrosis     Head injury      Past Surgical History:   Procedure Laterality Date    BRONCHOSCOPY N/A 11/10/2022    Procedure: BRONCHOSCOPY WITH BRONCHOALVEOLAR LAVAGE, BRONCHIAL WASHINGS, AIRWAY INSPECTION;  Surgeon: Bright Worley MD;  Location: [facility] ENDOSCOPY;  Service: Pulmonary;  Laterality: N/A;  MUCUS PLUGGING     Family History   Problem Relation Age of Onset    Asthma Sister        Home  "Medications:  Prior to Admission medications    Medication Sig Start Date End Date Taking? Authorizing Provider   budesonide (PULMICORT) 0.5 MG/2ML nebulizer solution Take 2 mL by nebulization 2 (Two) Times a Day for 30 days. 12/5/24 1/4/25  Blossom Garcia APRN   ondansetron ODT (ZOFRAN-ODT) 4 MG disintegrating tablet Place 1 tablet on the tongue 4 (Four) Times a Day As Needed for Nausea or Vomiting. 12/29/24   Efra Steiner PA-C   pancrelipase, Lip-Prot-Amyl, (CREON) 6000-57025 units capsule delayed-release particles capsule Take 1 capsule by mouth 3 (Three) Times a Day With Meals. 11/19/24   Bernie Jackson APRN        Social History:   Social History     Tobacco Use    Smoking status: Never     Passive exposure: Never    Smokeless tobacco: Current     Types: Chew   Vaping Use    Vaping status: Every Day    Substances: THC    Devices: Disposable   Substance Use Topics    Alcohol use: Not Currently     Comment: About a year    Drug use: Yes     Types: Marijuana     Comment: once a month         Review of Systems:  Review of Systems   Constitutional: Negative.  Positive for fever.   HENT: Negative.     Eyes: Negative.    Respiratory: Negative.  Positive for cough. Negative for shortness of breath.    Cardiovascular: Negative.    Gastrointestinal: Negative.  Negative for nausea and vomiting.   Endocrine: Negative.    Genitourinary: Negative.    Musculoskeletal: Negative.    Skin: Negative.    Allergic/Immunologic: Negative.    Neurological: Negative.    Hematological: Negative.    Psychiatric/Behavioral: Negative.          Physical Exam:  /78   Pulse 105   Temp 98.2 °F (36.8 °C) (Oral)   Resp 20   Ht 154.9 cm (61\")   Wt 49.5 kg (109 lb 2 oz)   SpO2 94%   BMI 20.62 kg/m²         Physical Exam  Vitals and nursing note reviewed.   Constitutional:       Appearance: Normal appearance.   HENT:      Head: Normocephalic and atraumatic.      Nose: Nose normal.      Mouth/Throat:      Mouth: Mucous " membranes are moist.   Eyes:      Extraocular Movements: Extraocular movements intact.      Conjunctiva/sclera: Conjunctivae normal.      Pupils: Pupils are equal, round, and reactive to light.   Cardiovascular:      Rate and Rhythm: Normal rate and regular rhythm.      Heart sounds: Normal heart sounds.   Pulmonary:      Effort: Pulmonary effort is normal. No tachypnea, accessory muscle usage, respiratory distress or retractions.      Breath sounds: Normal breath sounds. No stridor. No decreased breath sounds, wheezing, rhonchi or rales.   Chest:      Chest wall: No tenderness.   Abdominal:      General: Abdomen is flat. Bowel sounds are normal. There is no distension.      Palpations: Abdomen is soft.   Musculoskeletal:         General: Normal range of motion.      Cervical back: Normal range of motion and neck supple.   Skin:     General: Skin is warm and dry.      Coloration: Skin is not cyanotic.   Neurological:      General: No focal deficit present.      Mental Status: He is alert and oriented to person, place, and time.   Psychiatric:         Attention and Perception: Attention and perception normal.         Mood and Affect: Mood normal.         Behavior: Behavior normal.                            Medical Decision Making:      Comorbidities that affect care:    Head injury, cystic fibrosis    External Notes reviewed:    Previous Clinic Note: Office visit with PCP 12/5/2024 and Previous ED Note: [facility] ED visit 12/29/2024      The following orders were placed and all results were independently analyzed by me:  Orders Placed This Encounter   Procedures    COVID-19, FLU A/B, RSV PCR 1 HR TAT - Swab, Nasopharynx    Rapid Strep A Screen - Swab, Throat    Beta Strep Culture, Throat - Swab, Throat    Blood Culture - Blood,    Blood Culture - Blood,    XR Chest 1 View    Comprehensive Metabolic Panel    CBC Auto Differential    Lactic Acid, Plasma    Mononucleosis Screen    CBC & Differential       Medications Given  in the Emergency Department:  Medications   brompheniramine-pseudoephedrine-DM syrup 10 mL (10 mL Oral Given 1/14/25 2225)   ipratropium-albuterol (DUO-NEB) nebulizer solution 3 mL (3 mL Nebulization Given 1/14/25 2231)   cefTRIAXone (ROCEPHIN) in NS 2 GRAMS/20ml IV PUSH syringe (2,000 mg Intravenous Given 1/14/25 2225)   sepsis fluid NS 0.9 % bolus 1,485 mL (1,485 mL Intravenous New Bag 1/14/25 2224)        ED Course:    The patient was initially evaluated in the triage area where orders were placed. The patient was later dispositioned by Herminio Garcia PA-C.      The patient was advised to stay for completion of workup which includes but is not limited to communication of labs and radiological results, reassessment and plan. The patient was advised that leaving prior to disposition by a provider could result in critical findings that are not communicated to the patient.     ED Course as of 01/14/25 2342 Tue Jan 14, 2025 2039 PROVIDER IN TRIAGE  Patient was evaluated by me in triage, Bailey Seaver, APRN, FNP-C.  Orders were placed and patient is currently awaiting final results and disposition.   [AS]   2207 Patient meet SIRS criteria.  Sepsis bolus along with IV ceftriaxone for pneumonia ordered [AJ]      ED Course User Index  [AJ] Herminio Garcia PA-C  [AS] Seaver, Alyce B, APRN       Labs:    Lab Results (last 24 hours)       Procedure Component Value Units Date/Time    COVID-19, FLU A/B, RSV PCR 1 HR TAT - Swab, Nasopharynx [[id number]]  (Normal) Collected: 01/14/25 2041    Specimen: Swab from Nasopharynx Updated: 01/14/25 2211     COVID19 Not Detected     Influenza A PCR Not Detected     Influenza B PCR Not Detected     RSV, PCR Not Detected    Narrative:      Fact sheet for providers: https://www.fda.gov/media/674211/download    Fact sheet for patients: https://www.fda.gov/media/500969/download    Test performed by PCR.    Rapid Strep A Screen - Swab, Throat [[id number]]  (Normal) Collected:  01/14/25 2042    Specimen: Swab from Throat Updated: 01/14/25 2108     Strep A Ag Negative    CBC & Differential [[id number]]  (Abnormal) Collected: 01/14/25 2042    Specimen: Blood from Arm, Right Updated: 01/14/25 2051    Narrative:      The following orders were created for panel order CBC & Differential.  Procedure                               Abnormality         Status                     ---------                               -----------         ------                     CBC Auto Differential[[id number]]        Abnormal            Final result                 Please view results for these tests on the individual orders.    Comprehensive Metabolic Panel [[id number]]  (Abnormal) Collected: 01/14/25 2042    Specimen: Blood from Arm, Right Updated: 01/14/25 2120     Glucose 205 mg/dL      BUN 11 mg/dL      Creatinine 0.78 mg/dL      Sodium 136 mmol/L      Potassium 4.4 mmol/L      Chloride 100 mmol/L      CO2 27.8 mmol/L      Calcium 9.1 mg/dL      Total Protein 7.0 g/dL      Albumin 2.8 g/dL      ALT (SGPT) 97 U/L      AST (SGOT) 86 U/L      Alkaline Phosphatase 217 U/L      Total Bilirubin 0.3 mg/dL      Globulin 4.2 gm/dL      A/G Ratio 0.7 g/dL      BUN/Creatinine Ratio 14.1     Anion Gap 8.2 mmol/L      eGFR 124.6 mL/min/1.73     Narrative:      GFR Categories in Chronic Kidney Disease (CKD)      GFR Category          GFR (mL/min/1.73)    Interpretation  G1                     90 or greater         Normal or high (1)  G2                      60-89                Mild decrease (1)  G3a                   45-59                Mild to moderate decrease  G3b                   30-44                Moderate to severe decrease  G4                    15-29                Severe decrease  G5                    14 or less           Kidney failure          (1)In the absence of evidence of kidney disease, neither GFR category G1 or G2 fulfill the criteria for CKD.    eGFR calculation 2021 CKD-EPI creatinine equation,  which does not include race as a factor    CBC Auto Differential [[id number]]  (Abnormal) Collected: 01/14/25 2042    Specimen: Blood from Arm, Right Updated: 01/14/25 2051     WBC 18.93 10*3/mm3      RBC 4.28 10*6/mm3      Hemoglobin 12.2 g/dL      Hematocrit 37.6 %      MCV 87.9 fL      MCH 28.5 pg      MCHC 32.4 g/dL      RDW 13.9 %      RDW-SD 44.7 fl      MPV 10.2 fL      Platelets 487 10*3/mm3      Neutrophil % 80.3 %      Lymphocyte % 9.4 %      Monocyte % 7.2 %      Eosinophil % 2.0 %      Basophil % 0.6 %      Immature Grans % 0.5 %      Neutrophils, Absolute 15.20 10*3/mm3      Lymphocytes, Absolute 1.77 10*3/mm3      Monocytes, Absolute 1.37 10*3/mm3      Eosinophils, Absolute 0.38 10*3/mm3      Basophils, Absolute 0.12 10*3/mm3      Immature Grans, Absolute 0.09 10*3/mm3      nRBC 0.0 /100 WBC     Beta Strep Culture, Throat - Swab, Throat [[id number]] Collected: 01/14/25 2042    Specimen: Swab from Throat Updated: 01/14/25 2108    Blood Culture - Blood, Arm, Left [[id number]] Collected: 01/14/25 2200    Specimen: Blood from Arm, Left Updated: 01/14/25 2205    Blood Culture - Blood, Arm, Left [[id number]] Collected: 01/14/25 2200    Specimen: Blood from Arm, Left Updated: 01/14/25 2205    Lactic Acid, Plasma [[id number]]  (Normal) Collected: 01/14/25 2200    Specimen: Blood Updated: 01/14/25 2222     Lactate 1.9 mmol/L     Mononucleosis Screen [[id number]]  (Normal) Collected: 01/14/25 2224    Specimen: Blood Updated: 01/14/25 2255     Monospot Negative             Imaging:    XR Chest 1 View    Result Date: 1/14/2025  XR CHEST 1 VW Date of Exam: 1/14/2025 8:55 PM EST Indication: cough and fever Comparison: Chest CT 9/17/2024. Chest radiograph 9/17/2024. Findings: Mediastinum: Cardiac silhouette appears unchanged and normal Lungs: Increased multifocal pulmonary nodules in the bilateral upper lobes and the left lower lobe. Focal consolidation in the left lower lobe. Pleura: No pleural effusion or pneumothorax.  Bones and soft tissues: No acute, displaced fracture seen.     Impression: Increased multifocal pulmonary nodules in the bilateral upper lobes and left lower lobe, likely representing new infectious/inflammatory process superimposed on chronic changes related to cystic fibrosis. Additional new focal consolidation in the left lower lobe also suspicious for pneumonia. Electronically Signed: Jack Holly  1/14/2025 9:14 PM EST  Workstation ID: [id number]       Differential Diagnosis and Discussion:      Cough: Differential diagnosis includes but is not limited to pneumonia, acute bronchitis, upper respiratory infection, ACE inhibitor use, allergic reaction, epiglottitis, seasonal allergies, chemical irritants, exercise-induced asthma, viral syndrome.  Fever: Based on the complaint of fever, differential diagnosis includes but is not limited to meningitis, pneumonia, pyelonephritis, acute uti,  systemic immune response syndrome, sepsis, viral syndrome, fungal infection, tick born illness and other bacterial infections.    PROCEDURES:    Labs were collected in the emergency department and all labs were reviewed and interpreted by me.  X-ray were performed in the emergency department and all X-ray impressions were independently interpreted by me.    No orders to display        Procedures    MDM                   Patient Care Considerations:    CT CHEST: I considered ordering a CT scan of the chest, however patient is not hypoxic or in respiratory distress      Consultants/Shared Management Plan:    None    Social Determinants of Health:    Patient has presented with family members who are responsible, reliable and will ensure follow up care.      Disposition and Care Coordination:    Discharged: I considered escalation of care by admitting this patient to the hospital, however patient is not hypoxic or in respiratory distress per    I have explained the patient´s condition, diagnoses and treatment plan based on the  information available to me at this time. I have answered questions and addressed any concerns. The patient has a good  understanding of the patient´s diagnosis, condition, and treatment plan as can be expected at this point. The vital signs have been stable. The patient´s condition is stable and appropriate for discharge from the emergency department.      The patient will pursue further outpatient evaluation with the primary care physician or other designated or consulting physician as outlined in the discharge instructions. They are agreeable to this plan of care and follow-up instructions have been explained in detail. The patient has received these instructions in written format and has expressed an understanding of the discharge instructions. The patient is aware that any significant change in condition or worsening of symptoms should prompt an immediate return to this or the closest emergency department or call to 911.  I have explained discharge medications and the need for follow up with the patient/caretakers. This was also printed in the discharge instructions. Patient was discharged with the following medications and follow up:      Medication List        New Prescriptions      levoFLOXacin 750 MG tablet  Commonly known as: LEVAQUIN  Take 1 tablet by mouth Daily for 5 days.               Where to Get Your Medications        These medications were sent to [facility]/pharmacy #[id number] - [place], [place] - [address] - [phone]  - [phone]   [address] [place] [address]      Hours: 24-hours Phone: [phone]   levoFLOXacin 750 MG tablet      Basilia Church APRN  [address] [facility]  [address]  [facility] [address]  [phone]             Final diagnoses:   Pneumonia of left lower lobe due to infectious organism   History of cystic fibrosis   Leukocytosis, unspecified type   Elevated LFTs        ED Disposition       ED Disposition   Discharge    Condition   Stable    Comment    --               This medical record created using voice recognition software.             Herminio Garcia PA-C  01/14/25 4991

## 2025-01-16 LAB — BACTERIA SPEC AEROBE CULT: NORMAL

## 2025-01-19 LAB
BACTERIA SPEC AEROBE CULT: NORMAL
BACTERIA SPEC AEROBE CULT: NORMAL

## 2025-03-07 ENCOUNTER — OFFICE VISIT (OUTPATIENT)
Dept: GASTROENTEROLOGY | Facility: CLINIC | Age: 30
End: 2025-03-07
Payer: COMMERCIAL

## 2025-03-07 VITALS
BODY MASS INDEX: 22.84 KG/M2 | HEIGHT: 61 IN | SYSTOLIC BLOOD PRESSURE: 119 MMHG | DIASTOLIC BLOOD PRESSURE: 63 MMHG | HEART RATE: 71 BPM | OXYGEN SATURATION: 100 % | WEIGHT: 121 LBS

## 2025-03-07 DIAGNOSIS — E84.0 CYSTIC FIBROSIS WITH PULMONARY MANIFESTATIONS: Primary | ICD-10-CM

## 2025-03-07 DIAGNOSIS — K86.89 PANCREATIC INSUFFICIENCY: ICD-10-CM

## 2025-03-07 RX ORDER — POLYETHYLENE GLYCOL 3350 17 G/17G
POWDER, FOR SOLUTION ORAL
COMMUNITY
Start: 2025-01-06

## 2025-03-07 RX ORDER — DORNASE ALFA 1 MG/ML
SOLUTION RESPIRATORY (INHALATION)
COMMUNITY
Start: 2024-12-30

## 2025-03-07 RX ORDER — PEDIATRIC MULTIVIT 61/D3/VIT K 1500-800
CAPSULE ORAL
COMMUNITY
Start: 2024-12-31

## 2025-03-07 RX ORDER — OMEPRAZOLE 20 MG/1
CAPSULE, DELAYED RELEASE ORAL
COMMUNITY
Start: 2024-12-30

## 2025-03-07 RX ORDER — TOBRAMYCIN INHALATION SOLUTION 300 MG/5ML
INHALANT RESPIRATORY (INHALATION)
COMMUNITY
Start: 2025-01-07

## 2025-03-07 RX ORDER — ALBUTEROL SULFATE 90 UG/1
AEROSOL, METERED RESPIRATORY (INHALATION)
COMMUNITY
Start: 2024-12-30

## 2025-03-07 RX ORDER — CHOLECALCIFEROL (VITAMIN D3) 1250 MCG
CAPSULE ORAL
COMMUNITY
Start: 2024-12-31

## 2025-03-07 NOTE — PROGRESS NOTES
Chief Complaint        Pancreatic insufficiency    Patient or patient representative verbalized consent for the use of Ambient Listening during the visit with  DARINEL Staples for chart documentation. 3/7/2025  15:24 EST    History of Present Illness      Marcus Jensen is a 29 y.o. male who presents to [facility]OLOG[facility] as a new patient     History of Present Illness  The patient is a 29-year-old male who presents for evaluation of pancreatic insufficiency.    He was referred to our facility by his pulmonologist due to persistent nausea and vomiting, which were initially attributed to pneumonia. He reports no current symptoms of nausea or vomiting. He also does not experience any epigastric pain or lower abdominal discomfort. His bowel movements are regular, occurring daily, and are well-formed, not loose.    He has a known diagnosis of cystic fibrosis and has been on Creon for several years. The medication appears to be effective in managing his condition, as evidenced by the absence of nausea and vomiting.    MEDICATIONS  Creon          Results       Result Review :   The following data was reviewed by: DARINEL Staples on 03/07/2025     CMP          9/19/2024    05:54 12/29/2024    09:57 1/14/2025    20:42   CMP   Glucose 168  126  205    BUN 10  11  11    Creatinine 0.94  0.93  0.78    EGFR 113.2  114.7  124.6    Sodium 135  137  136    Potassium 4.7  4.0  4.4    Chloride 99  102  100    Calcium 9.3  9.2  9.1    Total Protein 7.0  7.7  7.0    Albumin 2.9  4.0  2.8    Globulin  3.7  4.2    Total Bilirubin 0.2  0.4  0.3    Alkaline Phosphatase 252  256  217    AST (SGOT) 37  44  86    ALT (SGPT) 42  89  97    Albumin/Globulin Ratio  1.1  0.7    BUN/Creatinine Ratio 10.6  11.8  14.1    Anion Gap 9.9  11.4  8.2      CBC          9/19/2024    05:54 12/29/2024    09:57 1/14/2025    20:42   CBC   WBC 16.15  12.49  18.93    RBC 4.35  5.14  4.28    Hemoglobin 12.5  14.6  12.2   "  Hematocrit 39.4  46.8  37.6    MCV 90.6  91.1  87.9    MCH 28.7  28.4  28.5    MCHC 31.7  31.2  32.4    RDW 12.9  14.3  13.9    Platelets 670  328  487        Iron Profile No results found for: \"IRON\", \"TIBC\", \"LABIRON\", \"TRANSFERRIN\"  Ferritin No results found for: \"FERRITIN\"         Results          Past Medical History       Past Medical History:   Diagnosis Date    Cystic fibrosis     Head injury        Past Surgical History:   Procedure Laterality Date    BRONCHOSCOPY N/A 11/10/2022    Procedure: BRONCHOSCOPY WITH BRONCHOALVEOLAR LAVAGE, BRONCHIAL WASHINGS, AIRWAY INSPECTION;  Surgeon: Bright Worley MD;  Location: [facility] ENDOSCOPY;  Service: Pulmonary;  Laterality: N/A;  MUCUS PLUGGING         Current Outpatient Medications:     Cholecalciferol (Vitamin D3) 1.25 MG (21115 UT) capsule, , Disp: , Rfl:     Multiple Vitamins-Minerals (MVW Complete Formulation) capsule, , Disp: , Rfl:     pancrelipase, Lip-Prot-Amyl, (CREON) 6000-07646 units capsule delayed-release particles capsule, Take 1 capsule by mouth 3 (Three) Times a Day With Meals., Disp: 90 capsule, Rfl: 2    polyethylene glycol (MIRALAX) 17 GM/SCOOP powder, , Disp: , Rfl:     Pulmozyme 2.5 MG/2.5ML nebulizer solution, , Disp: , Rfl:     tobramycin PF (NADYA) 300 MG/5ML nebulizer solution, , Disp: , Rfl:     Ventolin  (90 Base) MCG/ACT inhaler, , Disp: , Rfl:     budesonide (PULMICORT) 0.5 MG/2ML nebulizer solution, Take 2 mL by nebulization 2 (Two) Times a Day for 30 days., Disp: 120 mL, Rfl: 0    omeprazole (priLOSEC) 20 MG capsule, , Disp: , Rfl:     ondansetron ODT (ZOFRAN-ODT) 4 MG disintegrating tablet, Place 1 tablet on the tongue 4 (Four) Times a Day As Needed for Nausea or Vomiting. (Patient not taking: Reported on 3/7/2025), Disp: 30 tablet, Rfl: 0     No Known Allergies    Family History   Problem Relation Age of Onset    Asthma Sister     Colon cancer Neg Hx         Social History     Social History Narrative    Not on file " "      Objective       Objective     Vital Signs:   /63 (BP Location: Left arm, Patient Position: Sitting, Cuff Size: Adult)   Pulse 71   Ht 154.9 cm (60.98\")   Wt 54.9 kg (121 lb)   SpO2 100%   BMI 22.87 kg/m²     Body mass index is 22.87 kg/m².    Physical Exam    Physical Exam                 Assessment & Plan          Assessment and Plan    Diagnoses and all orders for this visit:    1. Cystic fibrosis with pulmonary manifestations (Primary)    2. Pancreatic insufficiency          Assessment & Plan  1. Pancreatic insufficiency.  He was referred by his pulmonologist for pancreatic insufficiency, which is expected with his cystic fibrosis. He reports that Creon is effectively managing his bowel movements, and he is not experiencing any current nausea or vomiting. He is advised to continue his current regimen of Creon. If he starts developing abdominal pain, especially upper abdominal pain, an ultrasound will be considered to ensure the pancreas looks healthy. If nausea and vomiting recur, an upper scope will be performed.    2. Cystic fibrosis.  He has a known diagnosis of cystic fibrosis and has been on Creon for several years.          Follow Up       Follow Up   Return if symptoms worsen or fail to improve.  Patient was given instructions and counseling regarding his condition or for health maintenance advice. Please see specific information pulled into the AVS if appropriate.     "

## 2025-03-10 ENCOUNTER — PATIENT ROUNDING (BHMG ONLY) (OUTPATIENT)
Dept: GASTROENTEROLOGY | Facility: CLINIC | Age: 30
End: 2025-03-10
Payer: COMMERCIAL

## 2025-03-10 NOTE — PROGRESS NOTES
"3/10/2025      [facility], may I speak with Marcus Jensen     My name is Gina. I am calling from [facility]. I show that you had a recent visit with DARINEL Staples.    Before we get started may I verify your date of birth? 1995    I am calling to officially welcome you to our practice and ask about your recent visit. Is this a good time to talk? Yes, spoke with sister Megan ok per KANWAL she was present at the visit    Tell me about your visit with us. What things went well? \"Phuong was really nice, she answered all of our questions and there wasn't a wait\"    We strive to ensure that we protect your safety and privacy. Is there anything we could have done to improve this during your visit?    No     We're always looking for ways to make our patients' experiences even better. Do you have recommendations on ways we may improve?   No     Overall were you satisfied with your first visit to our practice?  Yes     I appreciate you taking the time to speak with me today. Is there anything else I can do for you?  No     I am glad to hear that you had a very good visit and I appreciate you taking the time to provide feedback on this call. We would greatly appreciate you filling out a survey if you receive one in the mail, email or text. This is a great opportunity to provide any additional feedback that you may think of after this call as well.       Thank you, and have a great day.   "

## (undated) DEVICE — BLCK/BITE BLOX WO/DENTL/RIM W/STRAP 54F

## (undated) DEVICE — DEV ATOMIZATION MUCOSAL/NASALTRACH

## (undated) DEVICE — Device

## (undated) DEVICE — SOLIDIFIER LIQLOC PLS 1500CC BT

## (undated) DEVICE — SINGLE USE BIOPSY VALVE MAJ-210: Brand: SINGLE USE BIOPSY VALVE (STERILE)

## (undated) DEVICE — LINER SURG CANSTR SXN S/RIGD 1500CC

## (undated) DEVICE — CUP SPECI 4OZ LF STRL

## (undated) DEVICE — SINGLE USE SUCTION VALVE MAJ-209: Brand: SINGLE USE SUCTION VALVE (STERILE)